# Patient Record
Sex: FEMALE | Race: BLACK OR AFRICAN AMERICAN | NOT HISPANIC OR LATINO | URBAN - METROPOLITAN AREA
[De-identification: names, ages, dates, MRNs, and addresses within clinical notes are randomized per-mention and may not be internally consistent; named-entity substitution may affect disease eponyms.]

---

## 2019-06-11 ENCOUNTER — INPATIENT (INPATIENT)
Facility: HOSPITAL | Age: 79
LOS: 2 days | Discharge: HOME HEALTH | End: 2019-06-14
Attending: INTERNAL MEDICINE | Admitting: INTERNAL MEDICINE
Payer: MEDICARE

## 2019-06-11 VITALS
WEIGHT: 250 LBS | DIASTOLIC BLOOD PRESSURE: 80 MMHG | SYSTOLIC BLOOD PRESSURE: 142 MMHG | OXYGEN SATURATION: 97 % | TEMPERATURE: 99 F | HEIGHT: 62 IN | RESPIRATION RATE: 20 BRPM | HEART RATE: 91 BPM

## 2019-06-11 DIAGNOSIS — G44.1 VASCULAR HEADACHE, NOT ELSEWHERE CLASSIFIED: ICD-10-CM

## 2019-06-11 DIAGNOSIS — E87.6 HYPOKALEMIA: ICD-10-CM

## 2019-06-11 DIAGNOSIS — I10 ESSENTIAL (PRIMARY) HYPERTENSION: ICD-10-CM

## 2019-06-11 DIAGNOSIS — I63.531 CEREBRAL INFARCTION DUE TO UNSPECIFIED OCCLUSION OR STENOSIS OF RIGHT POSTERIOR CEREBRAL ARTERY: ICD-10-CM

## 2019-06-11 DIAGNOSIS — E78.00 PURE HYPERCHOLESTEROLEMIA, UNSPECIFIED: ICD-10-CM

## 2019-06-11 LAB
ALBUMIN SERPL ELPH-MCNC: 3.9 G/DL — SIGNIFICANT CHANGE UP (ref 3.3–5)
ALP SERPL-CCNC: 133 U/L — HIGH (ref 40–120)
ALT FLD-CCNC: 15 U/L — SIGNIFICANT CHANGE UP (ref 12–78)
ANION GAP SERPL CALC-SCNC: 10 MMOL/L — SIGNIFICANT CHANGE UP (ref 5–17)
APTT BLD: 29.4 SEC — SIGNIFICANT CHANGE UP (ref 27.5–36.3)
AST SERPL-CCNC: 14 U/L — LOW (ref 15–37)
BASOPHILS # BLD AUTO: 0.02 K/UL — SIGNIFICANT CHANGE UP (ref 0–0.2)
BASOPHILS NFR BLD AUTO: 0.3 % — SIGNIFICANT CHANGE UP (ref 0–2)
BILIRUB SERPL-MCNC: 0.7 MG/DL — SIGNIFICANT CHANGE UP (ref 0.2–1.2)
BUN SERPL-MCNC: 10 MG/DL — SIGNIFICANT CHANGE UP (ref 7–23)
CALCIUM SERPL-MCNC: 9.7 MG/DL — SIGNIFICANT CHANGE UP (ref 8.5–10.1)
CHLORIDE SERPL-SCNC: 96 MMOL/L — SIGNIFICANT CHANGE UP (ref 96–108)
CO2 SERPL-SCNC: 29 MMOL/L — SIGNIFICANT CHANGE UP (ref 22–31)
CREAT SERPL-MCNC: 1.11 MG/DL — SIGNIFICANT CHANGE UP (ref 0.5–1.3)
EOSINOPHIL # BLD AUTO: 0.01 K/UL — SIGNIFICANT CHANGE UP (ref 0–0.5)
EOSINOPHIL NFR BLD AUTO: 0.1 % — SIGNIFICANT CHANGE UP (ref 0–6)
GLUCOSE SERPL-MCNC: 123 MG/DL — HIGH (ref 70–99)
HCT VFR BLD CALC: 41.8 % — SIGNIFICANT CHANGE UP (ref 34.5–45)
HGB BLD-MCNC: 13.7 G/DL — SIGNIFICANT CHANGE UP (ref 11.5–15.5)
IMM GRANULOCYTES NFR BLD AUTO: 0.4 % — SIGNIFICANT CHANGE UP (ref 0–1.5)
INR BLD: 1.15 RATIO — SIGNIFICANT CHANGE UP (ref 0.88–1.16)
LYMPHOCYTES # BLD AUTO: 1.71 K/UL — SIGNIFICANT CHANGE UP (ref 1–3.3)
LYMPHOCYTES # BLD AUTO: 22.1 % — SIGNIFICANT CHANGE UP (ref 13–44)
MCHC RBC-ENTMCNC: 27.8 PG — SIGNIFICANT CHANGE UP (ref 27–34)
MCHC RBC-ENTMCNC: 32.8 GM/DL — SIGNIFICANT CHANGE UP (ref 32–36)
MCV RBC AUTO: 85 FL — SIGNIFICANT CHANGE UP (ref 80–100)
MONOCYTES # BLD AUTO: 0.51 K/UL — SIGNIFICANT CHANGE UP (ref 0–0.9)
MONOCYTES NFR BLD AUTO: 6.6 % — SIGNIFICANT CHANGE UP (ref 2–14)
NEUTROPHILS # BLD AUTO: 5.46 K/UL — SIGNIFICANT CHANGE UP (ref 1.8–7.4)
NEUTROPHILS NFR BLD AUTO: 70.5 % — SIGNIFICANT CHANGE UP (ref 43–77)
NRBC # BLD: 0 /100 WBCS — SIGNIFICANT CHANGE UP (ref 0–0)
PLATELET # BLD AUTO: 270 K/UL — SIGNIFICANT CHANGE UP (ref 150–400)
POTASSIUM SERPL-MCNC: 3.3 MMOL/L — LOW (ref 3.5–5.3)
POTASSIUM SERPL-SCNC: 3.3 MMOL/L — LOW (ref 3.5–5.3)
PROT SERPL-MCNC: 8.8 GM/DL — HIGH (ref 6–8.3)
PROTHROM AB SERPL-ACNC: 12.9 SEC — SIGNIFICANT CHANGE UP (ref 10–12.9)
RBC # BLD: 4.92 M/UL — SIGNIFICANT CHANGE UP (ref 3.8–5.2)
RBC # FLD: 13.7 % — SIGNIFICANT CHANGE UP (ref 10.3–14.5)
SODIUM SERPL-SCNC: 135 MMOL/L — SIGNIFICANT CHANGE UP (ref 135–145)
WBC # BLD: 7.74 K/UL — SIGNIFICANT CHANGE UP (ref 3.8–10.5)
WBC # FLD AUTO: 7.74 K/UL — SIGNIFICANT CHANGE UP (ref 3.8–10.5)

## 2019-06-11 PROCEDURE — 71045 X-RAY EXAM CHEST 1 VIEW: CPT | Mod: 26

## 2019-06-11 PROCEDURE — 70551 MRI BRAIN STEM W/O DYE: CPT | Mod: 26

## 2019-06-11 PROCEDURE — 99285 EMERGENCY DEPT VISIT HI MDM: CPT

## 2019-06-11 PROCEDURE — 70450 CT HEAD/BRAIN W/O DYE: CPT | Mod: 26

## 2019-06-11 PROCEDURE — 99223 1ST HOSP IP/OBS HIGH 75: CPT | Mod: AI

## 2019-06-11 RX ORDER — ENOXAPARIN SODIUM 100 MG/ML
40 INJECTION SUBCUTANEOUS EVERY 24 HOURS
Refills: 0 | Status: DISCONTINUED | OUTPATIENT
Start: 2019-06-11 | End: 2019-06-14

## 2019-06-11 RX ORDER — AMLODIPINE BESYLATE 2.5 MG/1
10 TABLET ORAL DAILY
Refills: 0 | Status: DISCONTINUED | OUTPATIENT
Start: 2019-06-11 | End: 2019-06-12

## 2019-06-11 RX ORDER — ATORVASTATIN CALCIUM 80 MG/1
10 TABLET, FILM COATED ORAL AT BEDTIME
Refills: 0 | Status: DISCONTINUED | OUTPATIENT
Start: 2019-06-11 | End: 2019-06-12

## 2019-06-11 RX ORDER — ACETAMINOPHEN 500 MG
650 TABLET ORAL ONCE
Refills: 0 | Status: COMPLETED | OUTPATIENT
Start: 2019-06-11 | End: 2019-06-11

## 2019-06-11 RX ORDER — MONTELUKAST 4 MG/1
10 TABLET, CHEWABLE ORAL DAILY
Refills: 0 | Status: DISCONTINUED | OUTPATIENT
Start: 2019-06-11 | End: 2019-06-14

## 2019-06-11 RX ORDER — FOLIC ACID 0.8 MG
1 TABLET ORAL DAILY
Refills: 0 | Status: DISCONTINUED | OUTPATIENT
Start: 2019-06-11 | End: 2019-06-14

## 2019-06-11 RX ORDER — ASPIRIN/CALCIUM CARB/MAGNESIUM 324 MG
81 TABLET ORAL DAILY
Refills: 0 | Status: DISCONTINUED | OUTPATIENT
Start: 2019-06-11 | End: 2019-06-14

## 2019-06-11 RX ORDER — POTASSIUM CHLORIDE 20 MEQ
40 PACKET (EA) ORAL ONCE
Refills: 0 | Status: COMPLETED | OUTPATIENT
Start: 2019-06-11 | End: 2019-06-11

## 2019-06-11 RX ORDER — FAMOTIDINE 10 MG/ML
40 INJECTION INTRAVENOUS DAILY
Refills: 0 | Status: DISCONTINUED | OUTPATIENT
Start: 2019-06-11 | End: 2019-06-14

## 2019-06-11 RX ORDER — HYDROCHLOROTHIAZIDE 25 MG
12.5 TABLET ORAL DAILY
Refills: 0 | Status: DISCONTINUED | OUTPATIENT
Start: 2019-06-11 | End: 2019-06-14

## 2019-06-11 RX ADMIN — ATORVASTATIN CALCIUM 10 MILLIGRAM(S): 80 TABLET, FILM COATED ORAL at 21:26

## 2019-06-11 RX ADMIN — ENOXAPARIN SODIUM 40 MILLIGRAM(S): 100 INJECTION SUBCUTANEOUS at 21:25

## 2019-06-11 RX ADMIN — Medication 650 MILLIGRAM(S): at 14:30

## 2019-06-11 RX ADMIN — Medication 40 MILLIEQUIVALENT(S): at 21:26

## 2019-06-11 RX ADMIN — MONTELUKAST 10 MILLIGRAM(S): 4 TABLET, CHEWABLE ORAL at 21:26

## 2019-06-11 RX ADMIN — Medication 40 MILLIEQUIVALENT(S): at 15:55

## 2019-06-11 RX ADMIN — Medication 650 MILLIGRAM(S): at 14:02

## 2019-06-11 NOTE — ED ADULT NURSE NOTE - NSIMPLEMENTINTERV_GEN_ALL_ED
Implemented All Universal Safety Interventions:  [place] to call system. Call bell, personal items and telephone within reach. Instruct patient to call for assistance. Room bathroom lighting operational. Non-slip footwear when patient is off stretcher. Physically safe environment: no spills, clutter or unnecessary equipment. Stretcher in lowest position, wheels locked, appropriate side rails in place.

## 2019-06-11 NOTE — CONSULT NOTE ADULT - ASSESSMENT
Subjective Complaints:      Consult requested by ER doctor:                  Attending:     History of Present Illness:  Chief Complaint/Reason for Admission:  History of Present Illness:  HPI: headache  hx of htn  poor historian ct head r post  parietal  infarction r/o embolic   awaek alert speech fluent arm leg 4/5 sesnory intact  no hx of cancer  for work up mri misael echo doppler lipid profile  asa 81 mg  asim         PAST MEDICAL & SURGICAL HISTORY:  High cholesterol  HTN (hypertension)  78yFemale    MEDICATIONS  (STANDING):    MEDICATIONS  (PRN):      Allergies    dairy products (Unknown)  Milk (Unknown)  No Known Drug Allergies    Intolerances      FAMILY HISTORY:      REVIEW OF SYSTEMS:  General:  No wt loss, fevers, chills, night sweats  Eyes:  Good vision, no reported pain  ENT:  No sore throat, pain, runny nose, dysphagia  CV:  No pain, palpitatioins, hypo/hypertension  Resp:  No dyspnea, cough, tachypnea, wheezing  GI:  No pain, nausea, vomiting, diarrhea, constipatiion  :  No pain, bleeding, incontinence, nocturia  Muscle:  No pain, weakness  Breast:  No pain, abscess, mass, discharge  Neuro:  No weakness, tingling, memory problems  Psych:  No fatigue, insomnia, mood problems, depression  Endocrine:  No polyuria, polydypsia, cold/heat intolerance  Heme:  No petechiae, ecchymosis, easy bruisability  Skin:  No rash, tattoos, scars, edema      Vital Signs Last 24 Hrs  T(C): 37.3 (11 Jun 2019 15:53), Max: 37.4 (11 Jun 2019 12:29)  T(F): 99.1 (11 Jun 2019 15:53), Max: 99.4 (11 Jun 2019 12:29)  HR: 91 (11 Jun 2019 15:53) (91 - 91)  BP: 113/80 (11 Jun 2019 15:53) (113/80 - 142/80)  BP(mean): --  RR: 18 (11 Jun 2019 15:53) (18 - 20)  SpO2: 96% (11 Jun 2019 15:53) (96% - 97%)    GENERAL PHYSICAL EXAM:  General:  Appears stated age, well-groomed, well-nourished, no distress  HEENT:  NC/AT, patent nares w/ pink mucosa, OP clear w/o lesions, PERRL, EOMI, conjunctivae clear, no thyromegaly, nodules, adenopathy, no JVD  Chest:  Full & symmetric excursion, no increased effort, breath sounds clear  Cardiovascular:  Regular rhythm, S1, S2, no murmur/rub/S3/S4, no carotid/femoral/abdominal bruit, radial/pedal pulses 2+, no edema  Abdomen:  Soft, non-tender, non-distended, normoactive bowel sounds, no HSM  Extremities:  Gait & station:   Digits:   Nails:   Joints, Bones, Muscles:   ROM:   Stability:  Skin:  No rash/erythema/ecchymoses/petechiae/wounds/abscess/warm/dry  Musculoskeletal:  Full ROM in all joints w/o swelling/tenderness/effusion    NEUROLOGICAL EXAM:  HENT:  Normocephalic head; atraumatic head.  Neck supple.  ENT: normal looking.  Mental State:    Alert.  Fully oriented to person, place and date.  Coherent.  Speech clear and intact.  Cooperative.  Responds appropriately.    Cranial Nerves:  II-XII:   Pupils round and reactive to light and accommodation.  Extraocular movements full.  Visual fields full (no homonymous hemianopsia).  Visual acuity wnl.  Facial symmetry intact.  Tongue midline.  Motor Functions:  Moves all extremities.  No pronator drift of UE.  Claps hand well.  Hand  intact bilaterally.  Ambulatory.    Sensory Functions:   Intact to touch and pinprick to face and extremities.    Reflexes:  Deep tendon reflexes normoactive to biceps, knees and ankles.  Babinski absent (present).  Cerebellar Testing:    Finger to nose intact.  Nystagmus absent.  Neurovascular: Carotid auscultation full without bruits.      LABS:                        13.7   7.74  )-----------( 270      ( 11 Jun 2019 14:12 )             41.8     06-11    135  |  96  |  10  ----------------------------<  123<H>  3.3<L>   |  29  |  1.11    Ca    9.7      11 Jun 2019 14:12    TPro  8.8<H>  /  Alb  3.9  /  TBili  0.7  /  DBili  x   /  AST  14<L>  /  ALT  15  /  AlkPhos  133<H>  06-11    PT/INR - ( 11 Jun 2019 14:12 )   PT: 12.9 sec;   INR: 1.15 ratio         PTT - ( 11 Jun 2019 14:12 )  PTT:29.4 sec        RADIOLOGY & ADDITIONAL STUDIES:      Assessment & Opinion: events noted headache hx of htn ct ghead r parietal  infarction r/o embolic  for asim echo doppler asa 81 mg arm leg 4/5 sesnory intact will follow     Recommendations:  Brain MRI.  Carotid doppler.  Echocardiogram.  EEG.   DVT prophylaxis as ordered.  Medications:

## 2019-06-11 NOTE — H&P ADULT - NSHPREVIEWOFSYSTEMS_GEN_ALL_CORE
CONSTITUTIONAL: No fever, weight loss, or fatigue  EYES: No eye pain, visual disturbances, or discharge  ENMT:  No difficulty hearing, tinnitus, vertigo; No sinus or throat pain  NECK: No pain or stiffness  RESPIRATORY: No cough, wheezing, chills or hemoptysis; No shortness of breath  CARDIOVASCULAR: No chest pain, palpitations, dizziness, or leg swelling  GASTROINTESTINAL: No abdominal or epigastric pain. No nausea, vomiting, or hematemesis; No diarrhea or constipation. No melena or hematochezia.  GENITOURINARY: No dysuria, frequency, hematuria, or incontinence  NEUROLOGICAL:see history of present illness   SKIN: No itching, burning, rashes, or lesions   LYMPH NODES: No enlarged glands  ENDOCRINE: No heat or cold intolerance; No hair loss  MUSCULOSKELETAL: No joint pain or swelling; No muscle, back, or extremity pain  PSYCHIATRIC: No depression, anxiety, mood swings, or difficulty sleeping  HEME/LYMPH: No easy bruising, or bleeding gums  ALLERGY AND IMMUNOLOGIC: No hives or eczema

## 2019-06-11 NOTE — ED ADULT TRIAGE NOTE - CHIEF COMPLAINT QUOTE
daughter translating on behalf mother connie speaking , " My mom had a very bad headache yesterday and today she woke up dizzy and vomiting " pt denies blurred vision

## 2019-06-11 NOTE — H&P ADULT - NSHPPHYSICALEXAM_GEN_ALL_CORE
ICU Vital Signs Last 24 Hrs  T(C): 36 (11 Jun 2019 18:01), Max: 37.4 (11 Jun 2019 12:29)  T(F): 96.8 (11 Jun 2019 18:01), Max: 99.4 (11 Jun 2019 12:29)  HR: 81 (11 Jun 2019 18:01) (81 - 91)  BP: 114/71 (11 Jun 2019 18:01) (113/80 - 142/80)  BP(mean): --  ABP: --  ABP(mean): --  RR: 16 (11 Jun 2019 18:01) (16 - 20)  SpO2: 99% (11 Jun 2019 18:01) (96% - 99%)  GENERAL: NAD well-developed  HEAD:  Atraumatic, Normocephalic  EYES: EOMI, PERRLA, conjunctiva and sclera clear  ENMT: No tonsillar erythema, exudates, or enlargement; Moist mucous membranes, Good dentition, No lesions  NECK: Supple, No JVD, Normal thyroid  NERVOUS SYSTEM:  Alert & Oriented X3, Good concentration; Motor Strength 5/5 B/L upper and lower extremities; DTRs 2+ intact and symmetric  CHEST/LUNG: Clear to percussion bilaterally; No rales, rhonchi, wheezing, or rubs  HEART: Regular rate and rhythm; No murmurs, rubs, or gallops  ABDOMEN: Soft, Nontender, Nondistended; Bowel sounds present  EXTREMITIES:  2+ Peripheral Pulses, No clubbing, cyanosis, or edema  LYMPH: No lymphadenopathy   SKIN: No rashes or lesions

## 2019-06-11 NOTE — H&P ADULT - ASSESSMENT
79 female with history of Hypertension and elevated lipids presents with headache and found to have a right parietal cerebrovascular accident - neurology consult appreciated and will do cerebrovascular accident w/u     IMPROVE VTE Individual Risk Assessment          RISK                                                          Points  [  ] Previous VTE                                                3  [  ] Thrombophilia                                             2  [  ] Lower limb paralysis                                   2        (unable to hold up >15 seconds)    [  ] Current Cancer                                             2         (within 6 months)  [x  ] Immobilization > 24 hrs                              1  [  ] ICU/CCU stay > 24 hours                             1  [  x] Age > 60                                                         1    IMPROVE VTE Score: 2

## 2019-06-11 NOTE — ED PROVIDER NOTE - OBJECTIVE STATEMENT
78yo female presents with headache for last few days. Patient denies chest pain, fever, chills, abdominal pain, trauma, nausea, vomiting.

## 2019-06-11 NOTE — H&P ADULT - HISTORY OF PRESENT ILLNESS
78yo female presents with headache for last few days. Patient denies chest pain, fever, chills, abdominal pain, trauma, nausea, vomiting.- patient denies motor or sensory deficits and family concurs except for mild weakness while walking- in the emergency room she was found to have a parietal cerebrovascular accident and was admitted

## 2019-06-11 NOTE — ED PROVIDER NOTE - CARE PLAN
Principal Discharge DX:	Headache Principal Discharge DX:	CVA (cerebral vascular accident)  Secondary Diagnosis:	Headache

## 2019-06-11 NOTE — H&P ADULT - NSHPPOASURGSITEINCISION_GEN_ALL_CORE
Pt states no changes to meds or diet.  No bleeding issues.  Adjusted coumadin dose and instructed pt to increase Vit K intake today with a large broccoli serving.  Instructed pt to watch for s/s bleeding and report to ER for blunt trauma; pt denies bleeding issues at this time.  Recheck INR on Thursday.  Pt verbalizes.  Patient instructed regarding medication; results given and questions answered. Nutritional counseling given.  Dietary factors affecting therapy addressed.  Patient instructed to monitor for excessive bruising or bleeding.  Electronically signed by JR Shearer      
no

## 2019-06-12 DIAGNOSIS — R51 HEADACHE: ICD-10-CM

## 2019-06-12 LAB
ANION GAP SERPL CALC-SCNC: 9 MMOL/L — SIGNIFICANT CHANGE UP (ref 5–17)
BUN SERPL-MCNC: 11 MG/DL — SIGNIFICANT CHANGE UP (ref 7–23)
CALCIUM SERPL-MCNC: 9.6 MG/DL — SIGNIFICANT CHANGE UP (ref 8.5–10.1)
CHLORIDE SERPL-SCNC: 103 MMOL/L — SIGNIFICANT CHANGE UP (ref 96–108)
CHOLEST SERPL-MCNC: 215 MG/DL — HIGH (ref 10–199)
CO2 SERPL-SCNC: 23 MMOL/L — SIGNIFICANT CHANGE UP (ref 22–31)
CREAT SERPL-MCNC: 0.92 MG/DL — SIGNIFICANT CHANGE UP (ref 0.5–1.3)
GLUCOSE SERPL-MCNC: 104 MG/DL — HIGH (ref 70–99)
HCT VFR BLD CALC: 41.6 % — SIGNIFICANT CHANGE UP (ref 34.5–45)
HDLC SERPL-MCNC: 45 MG/DL — LOW
HGB BLD-MCNC: 13.5 G/DL — SIGNIFICANT CHANGE UP (ref 11.5–15.5)
LIPID PNL WITH DIRECT LDL SERPL: 154 MG/DL — HIGH
MCHC RBC-ENTMCNC: 28 PG — SIGNIFICANT CHANGE UP (ref 27–34)
MCHC RBC-ENTMCNC: 32.5 GM/DL — SIGNIFICANT CHANGE UP (ref 32–36)
MCV RBC AUTO: 86.1 FL — SIGNIFICANT CHANGE UP (ref 80–100)
NRBC # BLD: 0 /100 WBCS — SIGNIFICANT CHANGE UP (ref 0–0)
PLATELET # BLD AUTO: 255 K/UL — SIGNIFICANT CHANGE UP (ref 150–400)
POTASSIUM SERPL-MCNC: 4.2 MMOL/L — SIGNIFICANT CHANGE UP (ref 3.5–5.3)
POTASSIUM SERPL-SCNC: 4.2 MMOL/L — SIGNIFICANT CHANGE UP (ref 3.5–5.3)
RBC # BLD: 4.83 M/UL — SIGNIFICANT CHANGE UP (ref 3.8–5.2)
RBC # FLD: 13.9 % — SIGNIFICANT CHANGE UP (ref 10.3–14.5)
SODIUM SERPL-SCNC: 135 MMOL/L — SIGNIFICANT CHANGE UP (ref 135–145)
TOTAL CHOLESTEROL/HDL RATIO MEASUREMENT: 4.8 RATIO — SIGNIFICANT CHANGE UP (ref 3.3–7.1)
TRIGL SERPL-MCNC: 82 MG/DL — SIGNIFICANT CHANGE UP (ref 10–149)
WBC # BLD: 7.32 K/UL — SIGNIFICANT CHANGE UP (ref 3.8–10.5)
WBC # FLD AUTO: 7.32 K/UL — SIGNIFICANT CHANGE UP (ref 3.8–10.5)

## 2019-06-12 PROCEDURE — 93880 EXTRACRANIAL BILAT STUDY: CPT | Mod: 26

## 2019-06-12 PROCEDURE — 99233 SBSQ HOSP IP/OBS HIGH 50: CPT

## 2019-06-12 RX ORDER — AMLODIPINE BESYLATE 2.5 MG/1
5 TABLET ORAL DAILY
Refills: 0 | Status: DISCONTINUED | OUTPATIENT
Start: 2019-06-12 | End: 2019-06-14

## 2019-06-12 RX ORDER — ATORVASTATIN CALCIUM 80 MG/1
20 TABLET, FILM COATED ORAL AT BEDTIME
Refills: 0 | Status: DISCONTINUED | OUTPATIENT
Start: 2019-06-12 | End: 2019-06-14

## 2019-06-12 RX ORDER — ACETAMINOPHEN 500 MG
650 TABLET ORAL EVERY 6 HOURS
Refills: 0 | Status: DISCONTINUED | OUTPATIENT
Start: 2019-06-12 | End: 2019-06-14

## 2019-06-12 RX ADMIN — AMLODIPINE BESYLATE 10 MILLIGRAM(S): 2.5 TABLET ORAL at 11:52

## 2019-06-12 RX ADMIN — Medication 650 MILLIGRAM(S): at 01:00

## 2019-06-12 RX ADMIN — Medication 650 MILLIGRAM(S): at 00:21

## 2019-06-12 RX ADMIN — Medication 20 MILLIGRAM(S): at 11:52

## 2019-06-12 RX ADMIN — ENOXAPARIN SODIUM 40 MILLIGRAM(S): 100 INJECTION SUBCUTANEOUS at 21:57

## 2019-06-12 RX ADMIN — MONTELUKAST 10 MILLIGRAM(S): 4 TABLET, CHEWABLE ORAL at 11:51

## 2019-06-12 RX ADMIN — Medication 650 MILLIGRAM(S): at 22:19

## 2019-06-12 RX ADMIN — Medication 1 MILLIGRAM(S): at 11:51

## 2019-06-12 RX ADMIN — Medication 12.5 MILLIGRAM(S): at 11:51

## 2019-06-12 RX ADMIN — Medication 81 MILLIGRAM(S): at 11:51

## 2019-06-12 RX ADMIN — FAMOTIDINE 40 MILLIGRAM(S): 10 INJECTION INTRAVENOUS at 17:09

## 2019-06-12 RX ADMIN — ATORVASTATIN CALCIUM 20 MILLIGRAM(S): 80 TABLET, FILM COATED ORAL at 21:57

## 2019-06-12 NOTE — PHYSICAL THERAPY INITIAL EVALUATION ADULT - BALANCE TRAINING, PT EVAL
GOAL: pt will be able to independently ambulate 300ft without assistive device without loss of balance within 2 weeks

## 2019-06-12 NOTE — PHYSICAL THERAPY INITIAL EVALUATION ADULT - ADDITIONAL COMMENTS
Pt lives with several family members (all work full time) in a private home, 2 entry steps (+ rail), pt stays on main floor. Prior to admission pt was independent with all functional mobility including community ambulation without a device & ADL's. Pt has a tub/shower combo, no grab bars, fixed shower head, & standard toilet seat height. Pt is right hand dominant & wears eye glasses. Family endorse they will provide supervision for all functional mobility upon D/C home due to PT's safety concerns associated with visual impairment s/p CVA. Goal of therapy: return home.

## 2019-06-12 NOTE — PHYSICAL THERAPY INITIAL EVALUATION ADULT - PLANNED THERAPY INTERVENTIONS, PT EVAL
gait training/balance training/stairs: GOAL: pt will be able to independently ascend/descend 2 steps with one rail (step-to-step pattern) within 2 weeks/transfer training/bed mobility training

## 2019-06-12 NOTE — PHYSICAL THERAPY INITIAL EVALUATION ADULT - FOLLOWS COMMANDS/ANSWERS QUESTIONS, REHAB EVAL
Pt. is able to do complex problem solving but is limited owing to limited flexibility, insight, social behavior, and endurance. Pt. is susceptible to breakdown of behavior outside of a structured setting (e.g., school or work). In stressful situations, shows reserved cognitive functions. Cognitive processing is slow./able to follow multistep instructions

## 2019-06-12 NOTE — PROGRESS NOTE ADULT - ASSESSMENT
Subjective Complaints:  Historian:             Vital Signs Last 24 Hrs  T(C): 37 (12 Jun 2019 16:55), Max: 37.6 (11 Jun 2019 23:35)  T(F): 98.6 (12 Jun 2019 16:55), Max: 99.7 (12 Jun 2019 04:36)  HR: 81 (12 Jun 2019 16:55) (81 - 89)  BP: 119/65 (12 Jun 2019 16:55) (116/76 - 138/73)  BP(mean): --  RR: 16 (12 Jun 2019 16:55) (16 - 18)  SpO2: 97% (12 Jun 2019 16:55) (97% - 99%)    GENERAL PHYSICAL EXAM:  General:  Appears stated age, well-groomed, well-nourished, no distress  HEENT:  NC/AT, patent nares w/ pink mucosa, OP clear w/o lesions, PERRL, EOMI, conjunctivae clear, no thyromegaly, nodules, adenopathy, no JVD  Chest:  Full & symmetric excursion, no increased effort, breath sounds clear  Cardiovascular:  Regular rhythm, S1, S2, no murmur/rub/S3/S4, no carotid/femoral/abdominal bruit, radial/pedal pulses 2+, no edema  Abdomen:  Soft, non-tender, non-distended, normoactive bowel sounds, no HSM  Extremities:  Gait & station:   Digits:   Nails:   Joints, Bones, Muscles:   ROM:   Stability:  Skin:  No rash/erythema/ecchymoses/petechiae/wounds/abscess/warm/dry  Musculoskeletal:  Full ROM in all joints w/o swelling/tenderness/effusion        LABS:                        13.5   7.32  )-----------( 255      ( 12 Jun 2019 06:30 )             41.6     06-12    135  |  103  |  11  ----------------------------<  104<H>  4.2   |  23  |  0.92    Ca    9.6      12 Jun 2019 06:30    TPro  8.8<H>  /  Alb  3.9  /  TBili  0.7  /  DBili  x   /  AST  14<L>  /  ALT  15  /  AlkPhos  133<H>  06-11    PT/INR - ( 11 Jun 2019 14:12 )   PT: 12.9 sec;   INR: 1.15 ratio         PTT - ( 11 Jun 2019 14:12 )  PTT:29.4 sec      RADIOLOGY & ADDITIONAL STUDIES:        Neurology Progress Note:      Mental Status: awake  alert speech fluent  headache       Cranial Nerves: 2 .12 intact      Motor:  arm leg 4/5          Sensory:intact      Cerebellar: deferd       Gait:   unsteady       Assesment/Plan: mri misael new r mca post parietal infarctionr/o embolic for asim  echo  asa 81 mg for pt rehab discuss with family

## 2019-06-12 NOTE — PHYSICAL THERAPY INITIAL EVALUATION ADULT - PERTINENT HX OF CURRENT PROBLEM, REHAB EVAL
Pt is a 79yo Indonesian-Creole speaking F admitted with HA x several days. Imaging revealed positive small bland acute infarct

## 2019-06-12 NOTE — PROGRESS NOTE ADULT - SUBJECTIVE AND OBJECTIVE BOX
Patient is a 78y old  Female who presents with a chief complaint of headache (2019 17:56)    HPI:  78yo female presents with headache for last few days. Patient denies chest pain, fever, chills, abdominal pain, trauma, nausea, vomiting.- patient denies motor or sensory deficits and family concurs except for mild weakness while walking- in the emergency room she was found to have a parietal cerebrovascular accident and was admitted (2019 17:56)    SUBJECTIVE & OBJECTIVE: Pt seen and examined at bedside. Complaining of headache.   PHYSICAL EXAM:  ICU Vital Signs Last 24 Hrs  T(C): 37.1 (2019 12:37), Max: 37.6 (2019 23:35)  T(F): 98.7 (2019 12:37), Max: 99.7 (2019 04:36)  HR: 89 (2019 12:37) (81 - 91)  BP: 116/76 (2019 12:37) (113/80 - 138/73)  RR: 16 (2019 12:37) (16 - 18)  SpO2: 97% (2019 12:37) (96% - 99%)  Daily     Daily Weight in k.8 (2019 04:36)  I&O's Detail    GENERAL: NAD, well-groomed, well-developed  HEAD:  Atraumatic, Normocephalic  EYES: EOMI, PERRLA, conjunctiva and sclera clear  ENMT: Moist mucous membranes  NECK: Supple, No JVD  NERVOUS SYSTEM:  Alert & Oriented X3, Motor Strength 5/5 B/L upper and lower extremities; DTRs 2+ intact and symmetric  CHEST/LUNG: Clear to auscultation bilaterally; No rales, rhonchi, wheezing, or rubs  HEART: Regular rate and rhythm; No murmurs, rubs, or gallops  ABDOMEN: Soft, Nontender, Nondistended; Bowel sounds present  EXTREMITIES:  2+ Peripheral Pulses, No clubbing, cyanosis, or edema    MEDICATIONS  (STANDING):  amLODIPine   Tablet 10 milliGRAM(s) Oral daily  aspirin enteric coated 81 milliGRAM(s) Oral daily  atorvastatin 20 milliGRAM(s) Oral at bedtime  enalapril 20 milliGRAM(s) Oral daily  enoxaparin Injectable 40 milliGRAM(s) SubCutaneous every 24 hours  famotidine    Tablet 40 milliGRAM(s) Oral daily  folic acid 1 milliGRAM(s) Oral daily  hydrochlorothiazide 12.5 milliGRAM(s) Oral daily  montelukast 10 milliGRAM(s) Oral daily    MEDICATIONS  (PRN):  acetaminophen   Tablet .. 650 milliGRAM(s) Oral every 6 hours PRN Mild Pain (1 - 3)      LABS:                        13.5   7.32  )-----------( 255      ( 2019 06:30 )             41.6     06-12    135  |  103  |  11  ----------------------------<  104<H>  4.2   |  23  |  0.92    Ca    9.6      2019 06:30    TPro  8.8<H>  /  Alb  3.9  /  TBili  0.7  /  DBili  x   /  AST  14<L>  /  ALT  15  /  AlkPhos  133<H>  06-11    PT/INR - ( 2019 14:12 )   PT: 12.9 sec;   INR: 1.15 ratio         PTT - ( 2019 14:12 )  PTT:29.4 sec    RADIOLOGY & ADDITIONAL TESTS:  < from: MR Head No Cont (19 @ 14:56) >  Somewhat motion limited exam but positive for small bland acute infarct   in the posterior branch distribution of the right MCA    < end of copied text >    < from: US Duplex Carotid Arteries Complete, Bilateral (19 @ 08:30) >  No duplex evidence of hemodynamically significant internal carotid artery   stenosis.    < end of copied text >      DVT/GI ppx  Discussed with pt @ bedside

## 2019-06-12 NOTE — PROGRESS NOTE ADULT - PROBLEM SELECTOR PLAN 3
- would not aggressively treat HTN right now as patient is complaining of headache  - lower norvasc to 5 mg daily

## 2019-06-13 LAB — HBA1C BLD-MCNC: 6 % — HIGH (ref 4–5.6)

## 2019-06-13 PROCEDURE — 99232 SBSQ HOSP IP/OBS MODERATE 35: CPT

## 2019-06-13 RX ADMIN — Medication 20 MILLIGRAM(S): at 11:22

## 2019-06-13 RX ADMIN — ATORVASTATIN CALCIUM 20 MILLIGRAM(S): 80 TABLET, FILM COATED ORAL at 21:13

## 2019-06-13 RX ADMIN — FAMOTIDINE 40 MILLIGRAM(S): 10 INJECTION INTRAVENOUS at 11:21

## 2019-06-13 RX ADMIN — Medication 81 MILLIGRAM(S): at 11:22

## 2019-06-13 RX ADMIN — Medication 1 MILLIGRAM(S): at 11:21

## 2019-06-13 RX ADMIN — ENOXAPARIN SODIUM 40 MILLIGRAM(S): 100 INJECTION SUBCUTANEOUS at 21:13

## 2019-06-13 RX ADMIN — MONTELUKAST 10 MILLIGRAM(S): 4 TABLET, CHEWABLE ORAL at 11:21

## 2019-06-13 RX ADMIN — Medication 12.5 MILLIGRAM(S): at 05:36

## 2019-06-13 NOTE — PROGRESS NOTE ADULT - SUBJECTIVE AND OBJECTIVE BOX
INTERVAL HPI/OVERNIGHT EVENTS:  Pt seen and examined at bedside.     Allergies/Intolerance: dairy products (Unknown)  Milk (Unknown)  No Known Drug Allergies      MEDICATIONS  (STANDING):  amLODIPine   Tablet 5 milliGRAM(s) Oral daily  aspirin enteric coated 81 milliGRAM(s) Oral daily  atorvastatin 20 milliGRAM(s) Oral at bedtime  enalapril 20 milliGRAM(s) Oral daily  enoxaparin Injectable 40 milliGRAM(s) SubCutaneous every 24 hours  famotidine    Tablet 40 milliGRAM(s) Oral daily  folic acid 1 milliGRAM(s) Oral daily  hydrochlorothiazide 12.5 milliGRAM(s) Oral daily  montelukast 10 milliGRAM(s) Oral daily    MEDICATIONS  (PRN):  acetaminophen   Tablet .. 650 milliGRAM(s) Oral every 6 hours PRN Mild Pain (1 - 3)        ROS: all systems reviewed and wnl      PHYSICAL EXAMINATION:  Vital Signs Last 24 Hrs  T(C): 36.5 (13 Jun 2019 11:39), Max: 37 (12 Jun 2019 16:55)  T(F): 97.7 (13 Jun 2019 11:39), Max: 98.6 (12 Jun 2019 16:55)  HR: 90 (13 Jun 2019 11:39) (73 - 90)  BP: 128/81 (13 Jun 2019 11:39) (104/67 - 128/81)  BP(mean): --  RR: 16 (13 Jun 2019 11:39) (16 - 18)  SpO2: 95% (13 Jun 2019 11:39) (95% - 99%)  CAPILLARY BLOOD GLUCOSE            GENERAL:   NECK: supple, No JVD  CHEST/LUNG: clear to auscultation bilaterally; no rales, rhonchi, or wheezing b/l  HEART: normal S1, S2  ABDOMEN: BS+, soft, ND, NT   EXTREMITIES:  pulses palpable; no clubbing, cyanosis, or edema b/l LEs  SKIN: no rashes or lesions      LABS:                        13.5   7.32  )-----------( 255      ( 12 Jun 2019 06:30 )             41.6     06-12    135  |  103  |  11  ----------------------------<  104<H>  4.2   |  23  |  0.92    Ca    9.6      12 Jun 2019 06:30    TPro  8.8<H>  /  Alb  3.9  /  TBili  0.7  /  DBili  x   /  AST  14<L>  /  ALT  15  /  AlkPhos  133<H>  06-11    PT/INR - ( 11 Jun 2019 14:12 )   PT: 12.9 sec;   INR: 1.15 ratio         PTT - ( 11 Jun 2019 14:12 )  PTT:29.4 sec INTERVAL HPI/OVERNIGHT EVENTS:  Pt seen and examined at bedside.     Allergies/Intolerance: dairy products (Unknown)  Milk (Unknown)  No Known Drug Allergies      MEDICATIONS  (STANDING):  amLODIPine   Tablet 5 milliGRAM(s) Oral daily  aspirin enteric coated 81 milliGRAM(s) Oral daily  atorvastatin 20 milliGRAM(s) Oral at bedtime  enalapril 20 milliGRAM(s) Oral daily  enoxaparin Injectable 40 milliGRAM(s) SubCutaneous every 24 hours  famotidine    Tablet 40 milliGRAM(s) Oral daily  folic acid 1 milliGRAM(s) Oral daily  hydrochlorothiazide 12.5 milliGRAM(s) Oral daily  montelukast 10 milliGRAM(s) Oral daily    MEDICATIONS  (PRN):  acetaminophen   Tablet .. 650 milliGRAM(s) Oral every 6 hours PRN Mild Pain (1 - 3)        ROS: all systems reviewed and wnl      PHYSICAL EXAMINATION:  Vital Signs Last 24 Hrs  T(C): 36.5 (13 Jun 2019 11:39), Max: 37 (12 Jun 2019 16:55)  T(F): 97.7 (13 Jun 2019 11:39), Max: 98.6 (12 Jun 2019 16:55)  HR: 90 (13 Jun 2019 11:39) (73 - 90)  BP: 128/81 (13 Jun 2019 11:39) (104/67 - 128/81)  BP(mean): --  RR: 16 (13 Jun 2019 11:39) (16 - 18)  SpO2: 95% (13 Jun 2019 11:39) (95% - 99%)  CAPILLARY BLOOD GLUCOSE            GENERAL: comfortable, eating dinner, no focal deficits  NECK: supple, No JVD  CHEST/LUNG: clear to auscultation bilaterally; no rales, rhonchi, or wheezing b/l  HEART: normal S1, S2  ABDOMEN: BS+, soft, ND, NT   EXTREMITIES:  pulses palpable; no clubbing, cyanosis, or edema b/l LEs  SKIN: no rashes or lesions      LABS:                        13.5   7.32  )-----------( 255      ( 12 Jun 2019 06:30 )             41.6     06-12    135  |  103  |  11  ----------------------------<  104<H>  4.2   |  23  |  0.92    Ca    9.6      12 Jun 2019 06:30    TPro  8.8<H>  /  Alb  3.9  /  TBili  0.7  /  DBili  x   /  AST  14<L>  /  ALT  15  /  AlkPhos  133<H>  06-11    PT/INR - ( 11 Jun 2019 14:12 )   PT: 12.9 sec;   INR: 1.15 ratio         PTT - ( 11 Jun 2019 14:12 )  PTT:29.4 sec

## 2019-06-13 NOTE — PROGRESS NOTE ADULT - PROBLEM SELECTOR PLAN 1
- check hgba1c, TTE EF 60 % with normal LV, RV function, PT eval done, reccs for home with home pt.  MRI confirms acute right MCA infarct.   - TTE EF 60 % with normal LV, RV function, PT eval done, reccs for home with home pt.  MRI confirms acute right MCA infarct.  Stop tele as NSR.    -

## 2019-06-13 NOTE — PROGRESS NOTE ADULT - ASSESSMENT
Subjective Complaints:  Historian:             Vital Signs Last 24 Hrs  T(C): 37 (13 Jun 2019 17:33), Max: 37 (12 Jun 2019 23:38)  T(F): 98.6 (13 Jun 2019 17:33), Max: 98.6 (12 Jun 2019 23:38)  HR: 82 (13 Jun 2019 17:33) (73 - 90)  BP: 118/80 (13 Jun 2019 17:33) (104/67 - 128/81)  BP(mean): --  RR: 17 (13 Jun 2019 17:33) (16 - 18)  SpO2: 97% (13 Jun 2019 17:33) (95% - 99%)    GENERAL PHYSICAL EXAM:  General:  Appears stated age, well-groomed, well-nourished, no distress  HEENT:  NC/AT, patent nares w/ pink mucosa, OP clear w/o lesions, PERRL, EOMI, conjunctivae clear, no thyromegaly, nodules, adenopathy, no JVD  Chest:  Full & symmetric excursion, no increased effort, breath sounds clear  Cardiovascular:  Regular rhythm, S1, S2, no murmur/rub/S3/S4, no carotid/femoral/abdominal bruit, radial/pedal pulses 2+, no edema  Abdomen:  Soft, non-tender, non-distended, normoactive bowel sounds, no HSM  Extremities:  Gait & station:   Digits:   Nails:   Joints, Bones, Muscles:   ROM:   Stability:  Skin:  No rash/erythema/ecchymoses/petechiae/wounds/abscess/warm/dry  Musculoskeletal:  Full ROM in all joints w/o swelling/tenderness/effusion        LABS:                        13.5   7.32  )-----------( 255      ( 12 Jun 2019 06:30 )             41.6     06-12    135  |  103  |  11  ----------------------------<  104<H>  4.2   |  23  |  0.92    Ca    9.6      12 Jun 2019 06:30            RADIOLOGY & ADDITIONAL STUDIES:        Neurology Progress Note:      Mental Status: awawk alert speech fluent       Cranial Nerves: 2 /12 intact      Motor:   arm leg 4/5         Sensory: intact      Cerebellar:  deferd       Gait: no ataxia       Assesment/Plan: r cva  htn  for pt echo doppler on asa 81 mg discuss henryy family

## 2019-06-14 VITALS
HEART RATE: 84 BPM | DIASTOLIC BLOOD PRESSURE: 61 MMHG | SYSTOLIC BLOOD PRESSURE: 101 MMHG | OXYGEN SATURATION: 96 % | TEMPERATURE: 96 F

## 2019-06-14 PROCEDURE — 99239 HOSP IP/OBS DSCHRG MGMT >30: CPT

## 2019-06-14 RX ORDER — FAMOTIDINE 10 MG/ML
1 INJECTION INTRAVENOUS
Qty: 0 | Refills: 0 | DISCHARGE

## 2019-06-14 RX ORDER — MONTELUKAST 4 MG/1
1 TABLET, CHEWABLE ORAL
Qty: 0 | Refills: 0 | DISCHARGE

## 2019-06-14 RX ORDER — ATORVASTATIN CALCIUM 80 MG/1
40 TABLET, FILM COATED ORAL AT BEDTIME
Refills: 0 | Status: DISCONTINUED | OUTPATIENT
Start: 2019-06-14 | End: 2019-06-14

## 2019-06-14 RX ORDER — AMLODIPINE BESYLATE 2.5 MG/1
1 TABLET ORAL
Qty: 0 | Refills: 0 | DISCHARGE

## 2019-06-14 RX ORDER — FOLIC ACID 0.8 MG
1 TABLET ORAL
Qty: 0 | Refills: 0 | DISCHARGE

## 2019-06-14 RX ORDER — ASPIRIN/CALCIUM CARB/MAGNESIUM 324 MG
1 TABLET ORAL
Qty: 0 | Refills: 0 | DISCHARGE

## 2019-06-14 RX ADMIN — Medication 1 MILLIGRAM(S): at 12:15

## 2019-06-14 RX ADMIN — AMLODIPINE BESYLATE 5 MILLIGRAM(S): 2.5 TABLET ORAL at 05:17

## 2019-06-14 RX ADMIN — Medication 20 MILLIGRAM(S): at 05:17

## 2019-06-14 RX ADMIN — MONTELUKAST 10 MILLIGRAM(S): 4 TABLET, CHEWABLE ORAL at 12:15

## 2019-06-14 RX ADMIN — FAMOTIDINE 40 MILLIGRAM(S): 10 INJECTION INTRAVENOUS at 12:15

## 2019-06-14 RX ADMIN — Medication 12.5 MILLIGRAM(S): at 05:17

## 2019-06-14 RX ADMIN — Medication 81 MILLIGRAM(S): at 12:15

## 2019-06-14 NOTE — DISCHARGE NOTE PROVIDER - HOSPITAL COURSE
72 y/o female admitted with acute CVA of RMCA posterior branch confirmed on MRI, now all back to baseline. TTE showing  EF 60 % with normal LV, RV function, PT eval performed and recommended dc home with home PT 1-2x/week for 2 weeks. Patient is to stop norvasc secondary to low blood pressure. continue lipitor 40mg. continue home medications as directed. Follow up with Dr. Vivas neurologist in 1 week.  Discussed with Dr. Mora, who cleared patient for discharge home with home PT. 72 y/o female admitted with acute CVA of RMCA posterior branch confirmed on MRI, now all back to baseline. TTE showing  EF 60 % with normal LV, RV function, PT eval performed and recommended dc home with home PT 1-2x/week for 2 weeks. Patient is to stop norvasc secondary to low blood pressure. continue lipitor 40mg. continue home medications as directed. Follow up with Dr. Vivas neurologist in 1 week.  Discussed with Dr. Mora, who cleared patient for discharge home with home PT.         Addendum: Upon further review, CVA had cerebral edema.

## 2019-06-14 NOTE — DISCHARGE NOTE PROVIDER - CARE PROVIDER_API CALL
Vinayak Vivas)  Neurology  [address] [place] [address] [id number]  Phone: [phone]  Fax: [phone]  Follow Up Time:     PCP,   Phone: (   )    -  Fax: (   )    -  Follow Up Time:

## 2019-06-14 NOTE — PROGRESS NOTE ADULT - PROBLEM SELECTOR PROBLEM 1
Cerebrovascular accident (CVA) due to occlusion of right posterior cerebral artery

## 2019-06-14 NOTE — PROGRESS NOTE ADULT - PROBLEM SELECTOR PLAN 1
TTE EF 60 % with normal LV, RV function, PT eval done, reccs for home with home pt.  MRI confirms acute right MCA infarct.  Stop tele as NSR.    - TTE EF 60 % with normal LV, RV function, PT eval done, reccs for home with home pt.  MRI confirms acute right MCA infarct.  Stop tele as NSR.  Discharge to home   later today.   -

## 2019-06-14 NOTE — PROGRESS NOTE ADULT - ASSESSMENT
72 y/o female admitted with acute CVA, now better. 74 y/o female admitted with acute CVA, now all back to baseline.

## 2019-06-14 NOTE — PROGRESS NOTE ADULT - ATTENDING COMMENTS
Discharge home Friday with home PT.
Discharge to home today with home PT. See neurology in one week.

## 2019-06-14 NOTE — PROGRESS NOTE ADULT - SUBJECTIVE AND OBJECTIVE BOX
INTERVAL HPI/OVERNIGHT EVENTS:  Pt seen and examined at bedside.     Allergies/Intolerance: dairy products (Unknown)  Milk (Unknown)  No Known Drug Allergies      MEDICATIONS  (STANDING):  amLODIPine   Tablet 5 milliGRAM(s) Oral daily  aspirin enteric coated 81 milliGRAM(s) Oral daily  atorvastatin 20 milliGRAM(s) Oral at bedtime  enalapril 20 milliGRAM(s) Oral daily  enoxaparin Injectable 40 milliGRAM(s) SubCutaneous every 24 hours  famotidine    Tablet 40 milliGRAM(s) Oral daily  folic acid 1 milliGRAM(s) Oral daily  hydrochlorothiazide 12.5 milliGRAM(s) Oral daily  montelukast 10 milliGRAM(s) Oral daily    MEDICATIONS  (PRN):  acetaminophen   Tablet .. 650 milliGRAM(s) Oral every 6 hours PRN Mild Pain (1 - 3)        ROS: all systems reviewed and wnl      PHYSICAL EXAMINATION:  Vital Signs Last 24 Hrs  T(C): 35.8 (14 Jun 2019 11:31), Max: 37 (13 Jun 2019 17:33)  T(F): 96.5 (14 Jun 2019 11:31), Max: 98.6 (13 Jun 2019 17:33)  HR: 84 (14 Jun 2019 11:31) (66 - 90)  BP: 101/61 (14 Jun 2019 11:31) (101/61 - 138/76)  BP(mean): --  RR: 18 (14 Jun 2019 05:07) (16 - 18)  SpO2: 96% (14 Jun 2019 11:31) (94% - 99%)  CAPILLARY BLOOD GLUCOSE          06-13 @ 07:01  -  06-14 @ 07:00  --------------------------------------------------------  IN: 220 mL / OUT: 0 mL / NET: 220 mL        GENERAL:   NECK: supple, No JVD  CHEST/LUNG: clear to auscultation bilaterally; no rales, rhonchi, or wheezing b/l  HEART: normal S1, S2  ABDOMEN: BS+, soft, ND, NT   EXTREMITIES:  pulses palpable; no clubbing, cyanosis, or edema b/l LEs  SKIN: no rashes or lesions      LABS: INTERVAL HPI/OVERNIGHT EVENTS:  Pt seen and examined at bedside.     Allergies/Intolerance: dairy products (Unknown)  Milk (Unknown)  No Known Drug Allergies      MEDICATIONS  (STANDING):  amLODIPine   Tablet 5 milliGRAM(s) Oral daily  aspirin enteric coated 81 milliGRAM(s) Oral daily  atorvastatin 20 milliGRAM(s) Oral at bedtime  enalapril 20 milliGRAM(s) Oral daily  enoxaparin Injectable 40 milliGRAM(s) SubCutaneous every 24 hours  famotidine    Tablet 40 milliGRAM(s) Oral daily  folic acid 1 milliGRAM(s) Oral daily  hydrochlorothiazide 12.5 milliGRAM(s) Oral daily  montelukast 10 milliGRAM(s) Oral daily    MEDICATIONS  (PRN):  acetaminophen   Tablet .. 650 milliGRAM(s) Oral every 6 hours PRN Mild Pain (1 - 3)        ROS: all systems reviewed and wnl      PHYSICAL EXAMINATION:  Vital Signs Last 24 Hrs  T(C): 35.8 (14 Jun 2019 11:31), Max: 37 (13 Jun 2019 17:33)  T(F): 96.5 (14 Jun 2019 11:31), Max: 98.6 (13 Jun 2019 17:33)  HR: 84 (14 Jun 2019 11:31) (66 - 90)  BP: 101/61 (14 Jun 2019 11:31) (101/61 - 138/76)  BP(mean): --  RR: 18 (14 Jun 2019 05:07) (16 - 18)  SpO2: 96% (14 Jun 2019 11:31) (94% - 99%)  CAPILLARY BLOOD GLUCOSE          06-13 @ 07:01  -  06-14 @ 07:00  --------------------------------------------------------  IN: 220 mL / OUT: 0 mL / NET: 220 mL        GENERAL: stable in bed, tolerates regular diet well, no aphasia  NECK: supple, No JVD  CHEST/LUNG: clear to auscultation bilaterally; no rales, rhonchi, or wheezing b/l  HEART: normal S1, S2  ABDOMEN: BS+, soft, ND, NT   EXTREMITIES:  pulses palpable; no clubbing, cyanosis, or edema b/l LEs  SKIN: no rashes or lesions      LABS:

## 2019-06-14 NOTE — DISCHARGE NOTE PROVIDER - NSDCCPCAREPLAN_GEN_ALL_CORE_FT
PRINCIPAL DISCHARGE DIAGNOSIS  Diagnosis: CVA (cerebral vascular accident)  Assessment and Plan of Treatment: RMCA posterior branch infarct. Back to baseline. Follow up with neurologist Dr. Vivas in 1 week. Follow up with PCP within 10 days of dishcarge date. Continue medications as directed. STOP NORVASC.      SECONDARY DISCHARGE DIAGNOSES  Diagnosis: Essential hypertension  Assessment and Plan of Treatment: continue home bp medications EXCEPT NORVASC.    Diagnosis: Headache  Assessment and Plan of Treatment: tylenol as needed    Diagnosis: High cholesterol  Assessment and Plan of Treatment: lipitor 40mg daily.

## 2019-06-14 NOTE — PROGRESS NOTE ADULT - PROBLEM SELECTOR PLAN 3
- would not aggressively treat HTN right now as patient is complaining of headache  - lower norvasc to 5 mg daily - would not aggressively treat HTN right now as patient is complaining of headache  - Hold norvasc as BP low today.

## 2019-06-14 NOTE — DISCHARGE NOTE NURSING/CASE MANAGEMENT/SOCIAL WORK - NSDCDPATPORTLINK_GEN_ALL_CORE
You can access the [facility] Patient Portal, offered by [facility], by registering with the following website: [email or web address]

## 2019-06-14 NOTE — PROGRESS NOTE ADULT - PROBLEM SELECTOR PLAN 2
- elevated , increase statin dose to lipitor 20 mg/day - elevated , increase statin dose to lipitor 40 mg/day

## 2019-06-18 DIAGNOSIS — I63.9 CEREBRAL INFARCTION, UNSPECIFIED: ICD-10-CM

## 2019-06-18 DIAGNOSIS — T46.1X5A ADVERSE EFFECT OF CALCIUM-CHANNEL BLOCKERS, INITIAL ENCOUNTER: ICD-10-CM

## 2019-06-18 DIAGNOSIS — G44.1 VASCULAR HEADACHE, NOT ELSEWHERE CLASSIFIED: ICD-10-CM

## 2019-06-18 DIAGNOSIS — Z79.82 LONG TERM (CURRENT) USE OF ASPIRIN: ICD-10-CM

## 2019-06-18 DIAGNOSIS — I63.531 CEREBRAL INFARCTION DUE TO UNSPECIFIED OCCLUSION OR STENOSIS OF RIGHT POSTERIOR CEREBRAL ARTERY: ICD-10-CM

## 2019-06-18 DIAGNOSIS — Z79.52 LONG TERM (CURRENT) USE OF SYSTEMIC STEROIDS: ICD-10-CM

## 2019-06-18 DIAGNOSIS — I10 ESSENTIAL (PRIMARY) HYPERTENSION: ICD-10-CM

## 2019-06-18 DIAGNOSIS — E87.6 HYPOKALEMIA: ICD-10-CM

## 2019-06-18 DIAGNOSIS — Z79.899 OTHER LONG TERM (CURRENT) DRUG THERAPY: ICD-10-CM

## 2019-06-18 DIAGNOSIS — Z91.011 ALLERGY TO MILK PRODUCTS: ICD-10-CM

## 2019-06-18 DIAGNOSIS — I95.89 OTHER HYPOTENSION: ICD-10-CM

## 2019-06-18 DIAGNOSIS — R53.1 WEAKNESS: ICD-10-CM

## 2019-06-18 DIAGNOSIS — E78.00 PURE HYPERCHOLESTEROLEMIA, UNSPECIFIED: ICD-10-CM

## 2019-07-01 ENCOUNTER — OUTPATIENT (OUTPATIENT)
Dept: OUTPATIENT SERVICES | Facility: HOSPITAL | Age: 79
LOS: 1 days | End: 2019-07-01
Payer: MEDICARE

## 2019-07-01 PROCEDURE — G9001: CPT

## 2019-07-12 DIAGNOSIS — Z71.89 OTHER SPECIFIED COUNSELING: ICD-10-CM

## 2019-07-12 PROBLEM — I10 ESSENTIAL (PRIMARY) HYPERTENSION: Chronic | Status: ACTIVE | Noted: 2019-06-11

## 2019-07-12 PROBLEM — E78.00 PURE HYPERCHOLESTEROLEMIA, UNSPECIFIED: Chronic | Status: ACTIVE | Noted: 2019-06-11

## 2019-12-04 NOTE — INPATIENT CERTIFICATION FOR MEDICARE PATIENTS - PHYSICIAN CONCUR
I concur with the Admission Order and I certify that services are provided in accordance with Section 42 CFR § 412.3
EMT/paramedic

## 2021-10-14 PROCEDURE — 99285 EMERGENCY DEPT VISIT HI MDM: CPT

## 2021-10-15 ENCOUNTER — INPATIENT (INPATIENT)
Facility: HOSPITAL | Age: 81
LOS: 0 days | Discharge: NURSING, REHAB OR LONG-TERM CARE | End: 2021-10-15
Attending: INTERNAL MEDICINE | Admitting: INTERNAL MEDICINE
Payer: MEDICARE

## 2021-10-15 VITALS
SYSTOLIC BLOOD PRESSURE: 143 MMHG | RESPIRATION RATE: 16 BRPM | DIASTOLIC BLOOD PRESSURE: 84 MMHG | OXYGEN SATURATION: 98 % | HEART RATE: 85 BPM | TEMPERATURE: 98 F

## 2021-10-15 VITALS
SYSTOLIC BLOOD PRESSURE: 103 MMHG | OXYGEN SATURATION: 93 % | HEART RATE: 93 BPM | TEMPERATURE: 97 F | HEIGHT: 62 IN | WEIGHT: 220.02 LBS | RESPIRATION RATE: 17 BRPM | DIASTOLIC BLOOD PRESSURE: 71 MMHG

## 2021-10-15 DIAGNOSIS — Z86.73 PERSONAL HISTORY OF TRANSIENT ISCHEMIC ATTACK (TIA), AND CEREBRAL INFARCTION WITHOUT RESIDUAL DEFICITS: ICD-10-CM

## 2021-10-15 DIAGNOSIS — R31.9 HEMATURIA, UNSPECIFIED: ICD-10-CM

## 2021-10-15 DIAGNOSIS — G40.909 EPILEPSY, UNSPECIFIED, NOT INTRACTABLE, WITHOUT STATUS EPILEPTICUS: ICD-10-CM

## 2021-10-15 DIAGNOSIS — I48.0 PAROXYSMAL ATRIAL FIBRILLATION: ICD-10-CM

## 2021-10-15 DIAGNOSIS — I10 ESSENTIAL (PRIMARY) HYPERTENSION: ICD-10-CM

## 2021-10-15 DIAGNOSIS — R53.2 FUNCTIONAL QUADRIPLEGIA: ICD-10-CM

## 2021-10-15 DIAGNOSIS — E78.5 HYPERLIPIDEMIA, UNSPECIFIED: ICD-10-CM

## 2021-10-15 DIAGNOSIS — G30.9 ALZHEIMER'S DISEASE, UNSPECIFIED: ICD-10-CM

## 2021-10-15 LAB
ALBUMIN SERPL ELPH-MCNC: 2.7 G/DL — LOW (ref 3.3–5)
ALP SERPL-CCNC: 108 U/L — SIGNIFICANT CHANGE UP (ref 40–120)
ALT FLD-CCNC: 18 U/L — SIGNIFICANT CHANGE UP (ref 12–78)
ANION GAP SERPL CALC-SCNC: 5 MMOL/L — SIGNIFICANT CHANGE UP (ref 5–17)
APPEARANCE UR: ABNORMAL
AST SERPL-CCNC: 37 U/L — SIGNIFICANT CHANGE UP (ref 15–37)
BACTERIA # UR AUTO: ABNORMAL
BASOPHILS # BLD AUTO: 0.03 K/UL — SIGNIFICANT CHANGE UP (ref 0–0.2)
BASOPHILS NFR BLD AUTO: 0.5 % — SIGNIFICANT CHANGE UP (ref 0–2)
BILIRUB SERPL-MCNC: 0.3 MG/DL — SIGNIFICANT CHANGE UP (ref 0.2–1.2)
BILIRUB UR-MCNC: ABNORMAL
BUN SERPL-MCNC: 15 MG/DL — SIGNIFICANT CHANGE UP (ref 7–23)
CALCIUM SERPL-MCNC: 10.5 MG/DL — HIGH (ref 8.5–10.1)
CHLORIDE SERPL-SCNC: 105 MMOL/L — SIGNIFICANT CHANGE UP (ref 96–108)
CO2 SERPL-SCNC: 27 MMOL/L — SIGNIFICANT CHANGE UP (ref 22–31)
COLOR SPEC: ABNORMAL
CREAT SERPL-MCNC: 0.98 MG/DL — SIGNIFICANT CHANGE UP (ref 0.5–1.3)
DIFF PNL FLD: ABNORMAL
EOSINOPHIL # BLD AUTO: 0.16 K/UL — SIGNIFICANT CHANGE UP (ref 0–0.5)
EOSINOPHIL NFR BLD AUTO: 2.5 % — SIGNIFICANT CHANGE UP (ref 0–6)
EPI CELLS # UR: SIGNIFICANT CHANGE UP
FLUAV AG NPH QL: SIGNIFICANT CHANGE UP
FLUBV AG NPH QL: SIGNIFICANT CHANGE UP
GLUCOSE SERPL-MCNC: 101 MG/DL — HIGH (ref 70–99)
GLUCOSE UR QL: NEGATIVE MG/DL — SIGNIFICANT CHANGE UP
HCT VFR BLD CALC: 40.3 % — SIGNIFICANT CHANGE UP (ref 34.5–45)
HGB BLD-MCNC: 12.9 G/DL — SIGNIFICANT CHANGE UP (ref 11.5–15.5)
IMM GRANULOCYTES NFR BLD AUTO: 0.3 % — SIGNIFICANT CHANGE UP (ref 0–1.5)
KETONES UR-MCNC: ABNORMAL
LEUKOCYTE ESTERASE UR-ACNC: ABNORMAL
LYMPHOCYTES # BLD AUTO: 1.83 K/UL — SIGNIFICANT CHANGE UP (ref 1–3.3)
LYMPHOCYTES # BLD AUTO: 28.9 % — SIGNIFICANT CHANGE UP (ref 13–44)
MCHC RBC-ENTMCNC: 29.6 PG — SIGNIFICANT CHANGE UP (ref 27–34)
MCHC RBC-ENTMCNC: 32 GM/DL — SIGNIFICANT CHANGE UP (ref 32–36)
MCV RBC AUTO: 92.4 FL — SIGNIFICANT CHANGE UP (ref 80–100)
MONOCYTES # BLD AUTO: 0.64 K/UL — SIGNIFICANT CHANGE UP (ref 0–0.9)
MONOCYTES NFR BLD AUTO: 10.1 % — SIGNIFICANT CHANGE UP (ref 2–14)
NEUTROPHILS # BLD AUTO: 3.66 K/UL — SIGNIFICANT CHANGE UP (ref 1.8–7.4)
NEUTROPHILS NFR BLD AUTO: 57.7 % — SIGNIFICANT CHANGE UP (ref 43–77)
NITRITE UR-MCNC: NEGATIVE — SIGNIFICANT CHANGE UP
NRBC # BLD: 0 /100 WBCS — SIGNIFICANT CHANGE UP (ref 0–0)
PH UR: 5 — SIGNIFICANT CHANGE UP (ref 5–8)
PLATELET # BLD AUTO: 240 K/UL — SIGNIFICANT CHANGE UP (ref 150–400)
POTASSIUM SERPL-MCNC: 4.6 MMOL/L — SIGNIFICANT CHANGE UP (ref 3.5–5.3)
POTASSIUM SERPL-SCNC: 4.6 MMOL/L — SIGNIFICANT CHANGE UP (ref 3.5–5.3)
PROT SERPL-MCNC: 7.6 GM/DL — SIGNIFICANT CHANGE UP (ref 6–8.3)
PROT UR-MCNC: 500 MG/DL
RBC # BLD: 4.36 M/UL — SIGNIFICANT CHANGE UP (ref 3.8–5.2)
RBC # FLD: 15 % — HIGH (ref 10.3–14.5)
RBC CASTS # UR COMP ASSIST: >50 /HPF (ref 0–4)
SARS-COV-2 RNA SPEC QL NAA+PROBE: SIGNIFICANT CHANGE UP
SODIUM SERPL-SCNC: 137 MMOL/L — SIGNIFICANT CHANGE UP (ref 135–145)
SP GR SPEC: 1.02 — SIGNIFICANT CHANGE UP (ref 1.01–1.02)
UROBILINOGEN FLD QL: NEGATIVE MG/DL — SIGNIFICANT CHANGE UP
WBC # BLD: 6.34 K/UL — SIGNIFICANT CHANGE UP (ref 3.8–10.5)
WBC # FLD AUTO: 6.34 K/UL — SIGNIFICANT CHANGE UP (ref 3.8–10.5)
WBC UR QL: ABNORMAL

## 2021-10-15 PROCEDURE — 99239 HOSP IP/OBS DSCHRG MGMT >30: CPT

## 2021-10-15 PROCEDURE — 99222 1ST HOSP IP/OBS MODERATE 55: CPT

## 2021-10-15 RX ORDER — LACOSAMIDE 50 MG/1
100 TABLET ORAL
Refills: 0 | Status: DISCONTINUED | OUTPATIENT
Start: 2021-10-15 | End: 2021-10-15

## 2021-10-15 RX ORDER — AMLODIPINE BESYLATE 2.5 MG/1
1 TABLET ORAL
Qty: 0 | Refills: 0 | DISCHARGE

## 2021-10-15 RX ORDER — FAMOTIDINE 10 MG/ML
1 INJECTION INTRAVENOUS
Qty: 0 | Refills: 0 | DISCHARGE

## 2021-10-15 RX ORDER — INFLUENZA VIRUS VACCINE 15; 15; 15; 15 UG/.5ML; UG/.5ML; UG/.5ML; UG/.5ML
0.5 SUSPENSION INTRAMUSCULAR ONCE
Refills: 0 | Status: DISCONTINUED | OUTPATIENT
Start: 2021-10-15 | End: 2021-10-15

## 2021-10-15 RX ORDER — MEMANTINE HYDROCHLORIDE 10 MG/1
10 TABLET ORAL DAILY
Refills: 0 | Status: DISCONTINUED | OUTPATIENT
Start: 2021-10-15 | End: 2021-10-15

## 2021-10-15 RX ORDER — ATORVASTATIN CALCIUM 80 MG/1
40 TABLET, FILM COATED ORAL AT BEDTIME
Refills: 0 | Status: DISCONTINUED | OUTPATIENT
Start: 2021-10-15 | End: 2021-10-15

## 2021-10-15 RX ORDER — APIXABAN 2.5 MG/1
5 TABLET, FILM COATED ORAL
Refills: 0 | Status: DISCONTINUED | OUTPATIENT
Start: 2021-10-15 | End: 2021-10-15

## 2021-10-15 RX ORDER — APIXABAN 2.5 MG/1
1 TABLET, FILM COATED ORAL
Qty: 0 | Refills: 0 | DISCHARGE
Start: 2021-10-15

## 2021-10-15 RX ORDER — LACOSAMIDE 50 MG/1
1 TABLET ORAL
Qty: 0 | Refills: 0 | DISCHARGE
Start: 2021-10-15

## 2021-10-15 RX ORDER — METOPROLOL TARTRATE 50 MG
50 TABLET ORAL
Refills: 0 | Status: DISCONTINUED | OUTPATIENT
Start: 2021-10-15 | End: 2021-10-15

## 2021-10-15 RX ORDER — SODIUM CHLORIDE 9 MG/ML
500 INJECTION, SOLUTION INTRAVENOUS
Refills: 0 | Status: COMPLETED | OUTPATIENT
Start: 2021-10-15 | End: 2021-10-15

## 2021-10-15 RX ORDER — METOPROLOL TARTRATE 50 MG
1 TABLET ORAL
Qty: 0 | Refills: 0 | DISCHARGE
Start: 2021-10-15

## 2021-10-15 RX ORDER — MECLIZINE HCL 12.5 MG
1 TABLET ORAL
Qty: 0 | Refills: 0 | DISCHARGE

## 2021-10-15 RX ADMIN — SODIUM CHLORIDE 50 MILLILITER(S): 9 INJECTION, SOLUTION INTRAVENOUS at 05:22

## 2021-10-15 NOTE — SWALLOW BEDSIDE ASSESSMENT ADULT - SWALLOW EVAL: ORAL MUSCULATURE
informal observation - labial droop on the right/unable to assess due to poor participation/comprehension

## 2021-10-15 NOTE — SWALLOW BEDSIDE ASSESSMENT ADULT - SWALLOW EVAL: DIAGNOSIS
oropharyngeal dysphagia marked by reduced cognition, decreased oral grading/labial seal with trace lateral loss on the right, slow oral transport posterior with suspected delay in swallow trigger and decrease hyolaryngeal elevation/excursion to palpation. no overt signs of aspiration with consistencies trialed.

## 2021-10-15 NOTE — H&P ADULT - ASSESSMENT
Patient is an 81F with a PMH of recent CVA (nonverbal, wheelchair bound), pAF on eliquis, seizures on vimpat, dementia HTN who presents to the ED for rehab placement.  Patient unable to provide history, daughter - Lola - at the bedside to provide collateral.  Patient reportedly was in [place] 2 months ago for a family event when she suffered a massive stroke.  Was admitted to a hospital in [place] and subsequently discharged to a rehab center there.  Patient was moved from [place] back to [place] yesterday, family brought patient in for rehab placement here.  Family noted blood in her urine starting from yesterday.  Family reports no other complaints.  Vitals stable, labs benign.  UA significant for RBCs.  Will admit to med surg.     IMPROVE VTE Individual Risk Assessment          RISK                                                          Points  [  ] Previous VTE                                                3  [  ] Thrombophilia                                             2  [  ] Lower limb paralysis                                    2        (unable to hold up >15 seconds)    [  ] Current Cancer                                             2         (within 6 months)  [  ] Immobilization > 24 hrs                              1  [  ] ICU/CCU stay > 24 hours                            1  [  ] Age > 60                                                    1    IMPROVE VTE Score - 1

## 2021-10-15 NOTE — ED PROVIDER NOTE - PHYSICAL EXAMINATION
Constitutional: Awake, alert. nonverbal.   ENMT: Airway patent.  Eyes: Clear bilaterally, pupils equal, round and reactive to light.  Cardiac: Normal rate, regular rhythm.  Heart sounds S1, S2.  Respiratory: Breath sounds clear and equal bilaterally.  Gastrointestinal: Abdomen soft, non-tender, no guarding.  Neurological: Alert, nonverbal, no focal deficits appreciated.   Skin: No evidence of rash.

## 2021-10-15 NOTE — DISCHARGE NOTE NURSING/CASE MANAGEMENT/SOCIAL WORK - PATIENT PORTAL LINK FT
You can access the FollowMyHealth Patient Portal offered by [facility] by registering at the following website: [email or web address]. By joining [employer]’s FollowMyHealth portal, you will also be able to view your health information using other applications (apps) compatible with our system.

## 2021-10-15 NOTE — H&P ADULT - PROBLEM SELECTOR PLAN 5
vimpat Will replace nguyen.  UA shows significant RBCs.   Patient on eliquis  Consider urology eval if hematuria persists

## 2021-10-15 NOTE — SWALLOW BEDSIDE ASSESSMENT ADULT - H & P REVIEW
Patient is an 81F with a PMH of recent CVA (nonverbal, wheelchair bound), pAF on eliquis, seizures on vimpat, dementia HTN who presents to the ED for rehab placement.  Patient unable to provide history, daughter - Lola - at the bedside to provide collateral.  Patient reportedly was in [place] 2 months ago for a family event when she suffered a massive stroke.  Was admitted to a hospital in [place] and subsequently discharged to a rehab center there.  Patient was moved from [place] back to [place] yesterday, family brought patient in for rehab placement here.  Family noted blood in her urine starting from yesterday.  Family reports no other complaints.  Vitals stable, labs benign.  UA significant for RBCs.  Will admit to med surg./yes

## 2021-10-15 NOTE — DISCHARGE NOTE PROVIDER - HOSPITAL COURSE
82 yo F with a PMH of recent CVA (nonverbal, wheelchair bound), chronic Green, pAF on Eliquis, seizures on Vimpat, dementia, HLD &  HTN who presented to the ED for rehab placement. Of note, family noted blood in her urine at home. In the ED, her Green was replaced and hematuria resolved. Her daughter found a rehab for her and she was discharged to the rehab.    Discharge time: 43 minutes

## 2021-10-15 NOTE — ED ADULT NURSE NOTE - OBJECTIVE STATEMENT
from [place]  nguyen catheter placed since August 2021  saw blood today  PMH stroke August, htn  dark red blood in urine  denies pain Patient presents to ED awake, alert and oriented. Patient is aphasic and from [place]  nguyen catheter placed since August 2021  saw blood today  PMH stroke August, htn  dark red blood in urine  denies pain Patient presents to ED awake, alert and oriented. Patient is aphasic and bed bound from stroke in August. As per daughter who is at bedside, patient stayed in the hospital for 2 months in [place]. Prior to the stroke, patient used to talk and walk. Nguyen catheter was placed during hospitalization in [place] in August. As per daughter, today is the first time there is blood in the nguyen catheter. Dark red blood noted in nguyen catheter. PMH stroke in August, htn.

## 2021-10-15 NOTE — H&P ADULT - PROBLEM SELECTOR PLAN 1
PT eval for functionality.  Family interested in rehab placement  Swallow eval as family unsure of diet.  NPO for now

## 2021-10-15 NOTE — PATIENT PROFILE ADULT - STATED REASON FOR ADMISSION
[facility]    Progress Note    Mineshedwardkelly Hui Patient Status:  Inpatient    3/22/1937 MRN [id number]   Location Baylor Scott & White McLane Children's need to find a rehab here and blood in [place]

## 2021-10-15 NOTE — DISCHARGE NOTE PROVIDER - NSDCMRMEDTOKEN_GEN_ALL_CORE_FT
apixaban 5 mg oral tablet: 1 tab(s) orally 2 times a day  aspirin 81 mg oral delayed release tablet: 1 tab(s) orally once a day  atorvastatin 40 mg oral tablet: 1 tab(s) orally once a day (at bedtime)  enalapril 20 mg oral tablet: 1 tab(s) orally once a day  famotidine 40 mg oral tablet: 1 tab(s) orally once a day (at bedtime)  folic acid 1 mg oral tablet: 1 tab(s) orally once a day  gabapentin 100 mg oral capsule: 1 cap(s) orally once a day  hydroCHLOROthiazide 25 mg oral tablet: 1 tab(s) orally once a day (in the morning)  lacosamide 100 mg oral tablet: 1 tab(s) orally 2 times a day  metoprolol tartrate 50 mg oral tablet: 1 tab(s) orally 2 times a day  montelukast 10 mg oral tablet: 1 tab(s) orally once a day  Plavix 75 mg oral tablet: 1 tab(s) orally once a day

## 2021-10-15 NOTE — H&P ADULT - NSHPLABSRESULTS_GEN_ALL_CORE
Recent Vitals  T(C): 36.2 (10-15-21 @ 03:51), Max: 36.3 (10-15-21 @ 00:03)  HR: 96 (10-15-21 @ 03:37) (93 - 96)  BP: 114/83 (10-15-21 @ 03:37) (103/71 - 114/83)  RR: 14 (10-15-21 @ 03:37) (14 - 17)  SpO2: 95% (10-15-21 @ 03:37) (93% - 95%)                        12.9   6.34  )-----------( 240      ( 15 Oct 2021 02:58 )             40.3     10-15    137  |  105  |  15  ----------------------------<  101<H>  4.6   |  27  |  0.98    Ca    10.5<H>      15 Oct 2021 02:58    TPro  7.6  /  Alb  2.7<L>  /  TBili  0.3  /  DBili  x   /  AST  37  /  ALT  18  /  AlkPhos  108  10-15      LIVER FUNCTIONS - ( 15 Oct 2021 02:58 )  Alb: 2.7 g/dL / Pro: 7.6 gm/dL / ALK PHOS: 108 U/L / ALT: 18 U/L / AST: 37 U/L / GGT: x           Urinalysis Basic - ( 15 Oct 2021 02:15 )    Color: Brown / Appearance: very cloudy / S.020 / pH: x  Gluc: x / Ketone: Trace  / Bili: Small / Urobili: Negative mg/dL   Blood: x / Protein: 500 mg/dL / Nitrite: Negative   Leuk Esterase: Moderate / RBC: >50 /HPF / WBC 6-10   Sq Epi: x / Non Sq Epi: Occasional / Bacteria: Few        Home Medications:  aspirin 81 mg oral delayed release tablet: 1 tab(s) orally once a day (2019 14:27)  enalapril 20 mg oral tablet: 1 tab(s) orally once a day (2019 14:27)  famotidine 40 mg oral tablet: 1 tab(s) orally once a day (2019 14:27)  folic acid 1 mg oral tablet: 1 tab(s) orally once a day (2019 14:27)  hydroCHLOROthiazide 12.5 mg oral capsule: 1 cap(s) orally once a day (2019 14:27)  montelukast 10 mg oral tablet: 1 tab(s) orally once a day (2019 14:27)

## 2021-10-15 NOTE — DISCHARGE NOTE PROVIDER - INSTRUCTIONS
A copy of the hand xray has been faxed to the nursing staff at [facility].   Honey Thick liquids

## 2021-10-15 NOTE — ED PROVIDER NOTE - CLINICAL SUMMARY MEDICAL DECISION MAKING FREE TEXT BOX
brought to ED for hematuria and rehab placement. No abdominal tenderness. Will send ua/uc. Will send labs and admit for rehab.

## 2021-10-15 NOTE — H&P ADULT - HISTORY OF PRESENT ILLNESS
Patient is an 81F with a PMH of recent CVA (nonverbal, wheelchair bound), pAF on eliquis, seizures on vimpat, dementia HTN who presents to the ED for rehab placement.  Patient unable to provide history, daughter - Lola - at the bedside to provide collateral.  Patient reportedly was in [place] 2 months ago for a family event when she suffered a massive stroke.  Was admitted to a hospital in [place] and subsequently discharged to a rehab center there.  Patient was moved from [place] back to [place] yesterday, family brought patient in for rehab placement here.  Family noted blood in her urine starting from yesterday.  Family reports no other complaints.  Vitals stable, labs benign.  UA significant for RBCs.  Will admit to med surg.

## 2021-10-15 NOTE — SWALLOW BEDSIDE ASSESSMENT ADULT - SLP GENERAL OBSERVATIONS
pt seen bedside, alert and oriented ?self - head nod to name. pt responded to simple yes no questions via head nod/shake or shoulder shrug - most communication nonverbal gestures including smile and eye contact. pt intermittently followed directions with model. noted some phonation

## 2021-10-15 NOTE — H&P ADULT - NSICDXPASTMEDICALHX_GEN_ALL_CORE_FT
PAST MEDICAL HISTORY:  CVA (cerebrovascular accident)     High cholesterol     HTN (hypertension)     Seizure disorder

## 2021-10-15 NOTE — ED PROVIDER NOTE - OBJECTIVE STATEMENT
81F hx cva ~2mos ago while in [place], was at a wedding, was subsequently hospitalized in [place] for 2 months, pt just arrived back to [place] today, daughter brought her to hospital to be placed in rehab facility. Also incidentally noted to have bloody output in her nguyen. Pt is nonverbal since her stroke and is wheelchair bound (walked prior).

## 2021-10-16 LAB
CULTURE RESULTS: SIGNIFICANT CHANGE UP
SPECIMEN SOURCE: SIGNIFICANT CHANGE UP

## 2021-10-21 DIAGNOSIS — Z91.011 ALLERGY TO MILK PRODUCTS: ICD-10-CM

## 2021-10-21 DIAGNOSIS — Z99.3 DEPENDENCE ON WHEELCHAIR: ICD-10-CM

## 2021-10-21 DIAGNOSIS — I48.0 PAROXYSMAL ATRIAL FIBRILLATION: ICD-10-CM

## 2021-10-21 DIAGNOSIS — Z79.01 LONG TERM (CURRENT) USE OF ANTICOAGULANTS: ICD-10-CM

## 2021-10-21 DIAGNOSIS — G40.909 EPILEPSY, UNSPECIFIED, NOT INTRACTABLE, WITHOUT STATUS EPILEPTICUS: ICD-10-CM

## 2021-10-21 DIAGNOSIS — R31.9 HEMATURIA, UNSPECIFIED: ICD-10-CM

## 2021-10-21 DIAGNOSIS — F02.80 DEMENTIA IN OTHER DISEASES CLASSIFIED ELSEWHERE, UNSPECIFIED SEVERITY, WITHOUT BEHAVIORAL DISTURBANCE, PSYCHOTIC DISTURBANCE, MOOD DISTURBANCE, AND ANXIETY: ICD-10-CM

## 2021-10-21 DIAGNOSIS — G30.9 ALZHEIMER'S DISEASE, UNSPECIFIED: ICD-10-CM

## 2021-10-21 DIAGNOSIS — R33.9 RETENTION OF URINE, UNSPECIFIED: ICD-10-CM

## 2021-10-21 DIAGNOSIS — E78.5 HYPERLIPIDEMIA, UNSPECIFIED: ICD-10-CM

## 2021-10-21 DIAGNOSIS — I69.320 APHASIA FOLLOWING CEREBRAL INFARCTION: ICD-10-CM

## 2021-10-21 DIAGNOSIS — I10 ESSENTIAL (PRIMARY) HYPERTENSION: ICD-10-CM

## 2022-01-25 NOTE — DISCHARGE NOTE PROVIDER - CARE PROVIDER_API CALL
Is This A New Presentation, Or A Follow-Up?: Skin Lesion How Severe Is Your Skin Lesion?: moderate Have Your Skin Lesions Been Treated?: not been treated Your PMD,   Phone: (   )    -  Fax: (   )    -  Follow Up Time:

## 2022-08-03 ENCOUNTER — EMERGENCY (EMERGENCY)
Facility: HOSPITAL | Age: 82
LOS: 0 days | Discharge: HOME | End: 2022-08-03
Attending: STUDENT IN AN ORGANIZED HEALTH CARE EDUCATION/TRAINING PROGRAM

## 2022-08-03 VITALS
DIASTOLIC BLOOD PRESSURE: 101 MMHG | TEMPERATURE: 98 F | HEART RATE: 67 BPM | RESPIRATION RATE: 17 BRPM | OXYGEN SATURATION: 96 % | SYSTOLIC BLOOD PRESSURE: 160 MMHG

## 2022-08-03 VITALS
TEMPERATURE: 98 F | SYSTOLIC BLOOD PRESSURE: 174 MMHG | OXYGEN SATURATION: 97 % | RESPIRATION RATE: 18 BRPM | WEIGHT: 175.05 LBS | DIASTOLIC BLOOD PRESSURE: 119 MMHG | HEART RATE: 75 BPM | HEIGHT: 62 IN

## 2022-08-03 DIAGNOSIS — Z79.01 LONG TERM (CURRENT) USE OF ANTICOAGULANTS: ICD-10-CM

## 2022-08-03 DIAGNOSIS — R51.9 HEADACHE, UNSPECIFIED: ICD-10-CM

## 2022-08-03 DIAGNOSIS — R10.84 GENERALIZED ABDOMINAL PAIN: ICD-10-CM

## 2022-08-03 DIAGNOSIS — R10.9 UNSPECIFIED ABDOMINAL PAIN: ICD-10-CM

## 2022-08-03 DIAGNOSIS — Z86.73 PERSONAL HISTORY OF TRANSIENT ISCHEMIC ATTACK (TIA), AND CEREBRAL INFARCTION WITHOUT RESIDUAL DEFICITS: ICD-10-CM

## 2022-08-03 DIAGNOSIS — Z91.011 ALLERGY TO MILK PRODUCTS: ICD-10-CM

## 2022-08-03 DIAGNOSIS — I10 ESSENTIAL (PRIMARY) HYPERTENSION: ICD-10-CM

## 2022-08-03 DIAGNOSIS — Z79.82 LONG TERM (CURRENT) USE OF ASPIRIN: ICD-10-CM

## 2022-08-03 DIAGNOSIS — I48.91 UNSPECIFIED ATRIAL FIBRILLATION: ICD-10-CM

## 2022-08-03 DIAGNOSIS — E78.00 PURE HYPERCHOLESTEROLEMIA, UNSPECIFIED: ICD-10-CM

## 2022-08-03 LAB
ALBUMIN SERPL ELPH-MCNC: 3.3 G/DL — SIGNIFICANT CHANGE UP (ref 3.3–5)
ALP SERPL-CCNC: 115 U/L — SIGNIFICANT CHANGE UP (ref 40–120)
ALT FLD-CCNC: 18 U/L — SIGNIFICANT CHANGE UP (ref 12–78)
ANION GAP SERPL CALC-SCNC: 6 MMOL/L — SIGNIFICANT CHANGE UP (ref 5–17)
APPEARANCE UR: CLEAR — SIGNIFICANT CHANGE UP
APTT BLD: 33.4 SEC — SIGNIFICANT CHANGE UP (ref 27.5–35.5)
AST SERPL-CCNC: 21 U/L — SIGNIFICANT CHANGE UP (ref 15–37)
BASOPHILS # BLD AUTO: 0.02 K/UL — SIGNIFICANT CHANGE UP (ref 0–0.2)
BASOPHILS NFR BLD AUTO: 0.3 % — SIGNIFICANT CHANGE UP (ref 0–2)
BILIRUB SERPL-MCNC: 0.4 MG/DL — SIGNIFICANT CHANGE UP (ref 0.2–1.2)
BILIRUB UR-MCNC: NEGATIVE — SIGNIFICANT CHANGE UP
BUN SERPL-MCNC: 20 MG/DL — SIGNIFICANT CHANGE UP (ref 7–23)
CALCIUM SERPL-MCNC: 9.8 MG/DL — SIGNIFICANT CHANGE UP (ref 8.5–10.1)
CHLORIDE SERPL-SCNC: 105 MMOL/L — SIGNIFICANT CHANGE UP (ref 96–108)
CO2 SERPL-SCNC: 27 MMOL/L — SIGNIFICANT CHANGE UP (ref 22–31)
COLOR SPEC: YELLOW — SIGNIFICANT CHANGE UP
CREAT SERPL-MCNC: 1.23 MG/DL — SIGNIFICANT CHANGE UP (ref 0.5–1.3)
DIFF PNL FLD: NEGATIVE — SIGNIFICANT CHANGE UP
EGFR: 44 ML/MIN/1.73M2 — LOW
EOSINOPHIL # BLD AUTO: 0.13 K/UL — SIGNIFICANT CHANGE UP (ref 0–0.5)
EOSINOPHIL NFR BLD AUTO: 2.2 % — SIGNIFICANT CHANGE UP (ref 0–6)
GLUCOSE SERPL-MCNC: 110 MG/DL — HIGH (ref 70–99)
GLUCOSE UR QL: NEGATIVE MG/DL — SIGNIFICANT CHANGE UP
HCT VFR BLD CALC: 42.5 % — SIGNIFICANT CHANGE UP (ref 34.5–45)
HGB BLD-MCNC: 13.4 G/DL — SIGNIFICANT CHANGE UP (ref 11.5–15.5)
IMM GRANULOCYTES NFR BLD AUTO: 0.2 % — SIGNIFICANT CHANGE UP (ref 0–1.5)
INR BLD: 1.12 RATIO — SIGNIFICANT CHANGE UP (ref 0.88–1.16)
KETONES UR-MCNC: NEGATIVE — SIGNIFICANT CHANGE UP
LACTATE SERPL-SCNC: 1.2 MMOL/L — SIGNIFICANT CHANGE UP (ref 0.7–2)
LEUKOCYTE ESTERASE UR-ACNC: NEGATIVE — SIGNIFICANT CHANGE UP
LIDOCAIN IGE QN: 127 U/L — SIGNIFICANT CHANGE UP (ref 73–393)
LYMPHOCYTES # BLD AUTO: 1.73 K/UL — SIGNIFICANT CHANGE UP (ref 1–3.3)
LYMPHOCYTES # BLD AUTO: 29.1 % — SIGNIFICANT CHANGE UP (ref 13–44)
MCHC RBC-ENTMCNC: 26.7 PG — LOW (ref 27–34)
MCHC RBC-ENTMCNC: 31.5 G/DL — LOW (ref 32–36)
MCV RBC AUTO: 84.8 FL — SIGNIFICANT CHANGE UP (ref 80–100)
MONOCYTES # BLD AUTO: 0.59 K/UL — SIGNIFICANT CHANGE UP (ref 0–0.9)
MONOCYTES NFR BLD AUTO: 9.9 % — SIGNIFICANT CHANGE UP (ref 2–14)
NEUTROPHILS # BLD AUTO: 3.47 K/UL — SIGNIFICANT CHANGE UP (ref 1.8–7.4)
NEUTROPHILS NFR BLD AUTO: 58.3 % — SIGNIFICANT CHANGE UP (ref 43–77)
NITRITE UR-MCNC: NEGATIVE — SIGNIFICANT CHANGE UP
NRBC # BLD: 0 /100 WBCS — SIGNIFICANT CHANGE UP (ref 0–0)
PH UR: 8 — SIGNIFICANT CHANGE UP (ref 5–8)
PLATELET # BLD AUTO: 202 K/UL — SIGNIFICANT CHANGE UP (ref 150–400)
POTASSIUM SERPL-MCNC: 4.3 MMOL/L — SIGNIFICANT CHANGE UP (ref 3.5–5.3)
POTASSIUM SERPL-SCNC: 4.3 MMOL/L — SIGNIFICANT CHANGE UP (ref 3.5–5.3)
PROT SERPL-MCNC: 8.3 GM/DL — SIGNIFICANT CHANGE UP (ref 6–8.3)
PROT UR-MCNC: NEGATIVE MG/DL — SIGNIFICANT CHANGE UP
PROTHROM AB SERPL-ACNC: 13.4 SEC — SIGNIFICANT CHANGE UP (ref 10.5–13.4)
RBC # BLD: 5.01 M/UL — SIGNIFICANT CHANGE UP (ref 3.8–5.2)
RBC # FLD: 15.5 % — HIGH (ref 10.3–14.5)
RBC CASTS # UR COMP ASSIST: NEGATIVE /HPF — SIGNIFICANT CHANGE UP (ref 0–4)
SODIUM SERPL-SCNC: 138 MMOL/L — SIGNIFICANT CHANGE UP (ref 135–145)
SP GR SPEC: 1.01 — SIGNIFICANT CHANGE UP (ref 1.01–1.02)
TROPONIN I, HIGH SENSITIVITY RESULT: 9.1 NG/L — SIGNIFICANT CHANGE UP
UROBILINOGEN FLD QL: NEGATIVE MG/DL — SIGNIFICANT CHANGE UP
WBC # BLD: 5.95 K/UL — SIGNIFICANT CHANGE UP (ref 3.8–10.5)
WBC # FLD AUTO: 5.95 K/UL — SIGNIFICANT CHANGE UP (ref 3.8–10.5)
WBC UR QL: NEGATIVE — SIGNIFICANT CHANGE UP

## 2022-08-03 PROCEDURE — 93010 ELECTROCARDIOGRAM REPORT: CPT

## 2022-08-03 PROCEDURE — 70450 CT HEAD/BRAIN W/O DYE: CPT | Mod: 26,MA

## 2022-08-03 PROCEDURE — 99285 EMERGENCY DEPT VISIT HI MDM: CPT

## 2022-08-03 PROCEDURE — 74177 CT ABD & PELVIS W/CONTRAST: CPT | Mod: 26,MA

## 2022-08-03 PROCEDURE — 71045 X-RAY EXAM CHEST 1 VIEW: CPT | Mod: 26

## 2022-08-03 RX ORDER — ACETAMINOPHEN 500 MG
1000 TABLET ORAL ONCE
Refills: 0 | Status: COMPLETED | OUTPATIENT
Start: 2022-08-03 | End: 2022-08-03

## 2022-08-03 RX ORDER — ASPIRIN/CALCIUM CARB/MAGNESIUM 324 MG
1 TABLET ORAL
Qty: 0 | Refills: 0 | DISCHARGE

## 2022-08-03 RX ORDER — ATORVASTATIN CALCIUM 80 MG/1
1 TABLET, FILM COATED ORAL
Qty: 30 | Refills: 0

## 2022-08-03 RX ORDER — FOLIC ACID 0.8 MG
1 TABLET ORAL
Qty: 0 | Refills: 0 | DISCHARGE

## 2022-08-03 RX ORDER — METOPROLOL TARTRATE 50 MG
50 TABLET ORAL ONCE
Refills: 0 | Status: COMPLETED | OUTPATIENT
Start: 2022-08-03 | End: 2022-08-03

## 2022-08-03 RX ORDER — MONTELUKAST 4 MG/1
1 TABLET, CHEWABLE ORAL
Qty: 0 | Refills: 0 | DISCHARGE

## 2022-08-03 RX ORDER — GABAPENTIN 400 MG/1
1 CAPSULE ORAL
Qty: 0 | Refills: 0 | DISCHARGE

## 2022-08-03 RX ORDER — CLOPIDOGREL BISULFATE 75 MG/1
1 TABLET, FILM COATED ORAL
Qty: 0 | Refills: 0 | DISCHARGE

## 2022-08-03 RX ORDER — FAMOTIDINE 10 MG/ML
1 INJECTION INTRAVENOUS
Qty: 0 | Refills: 0 | DISCHARGE

## 2022-08-03 RX ADMIN — Medication 400 MILLIGRAM(S): at 18:40

## 2022-08-03 RX ADMIN — Medication 50 MILLIGRAM(S): at 22:24

## 2022-08-03 NOTE — ED PROVIDER NOTE - PHYSICAL EXAMINATION
GENERAL: Awake, alert, NAD  HEENT: NC/AT, moist mucous membranes  LUNGS: CTAB, no wheezes or crackles   CARDIAC: RRR, no m/r/g  ABDOMEN: Soft, normal BS, mild diffuse abd tenderness, non distended, no rebound, no guarding  EXT: No edema, no calf tenderness, 2+ DP pulses bilaterally, no deformities.  NEURO: Nonverbal per baseline. Moving all extremities.  SKIN: Warm and dry. No rash.  PSYCH: Normal affect.

## 2022-08-03 NOTE — ED ADULT NURSE NOTE - NSIMPLEMENTINTERV_GEN_ALL_ED
Implemented All Fall Risk Interventions:  [place] to call system. Call bell, personal items and telephone within reach. Instruct patient to call for assistance. Room bathroom lighting operational. Non-slip footwear when patient is off stretcher. Physically safe environment: no spills, clutter or unnecessary equipment. Stretcher in lowest position, wheels locked, appropriate side rails in place. Provide visual cue, wrist band, yellow gown, etc. Monitor gait and stability. Monitor for mental status changes and reorient to person, place, and time. Review medications for side effects contributing to fall risk. Reinforce activity limits and safety measures with patient and family.

## 2022-08-03 NOTE — ED PROVIDER NOTE - OBJECTIVE STATEMENT
83 y/o F with PMH HTN, HLD, CVA, nonverbal at baseline presenting to the ED with possible headache and abdominal pain. Per daughter, patient was initially complaining of headache this morning and then began to complain of abdominal pain throughout the day. She has no vomiting, diarrhea, fever, or chills. Patient is non verbal but points to abdomen if asked if she has pain. Patient intermittently points to head when asked if she is having headache.

## 2022-08-03 NOTE — ED PROVIDER NOTE - PATIENT PORTAL LINK FT
You can access the FollowMyHealth Patient Portal offered by [facility] by registering at the following website: [email or web address]. By joining [employer]’s FollowMyHealth portal, you will also be able to view your health information using other applications (apps) compatible with our system.

## 2022-08-03 NOTE — ED PROVIDER NOTE - CLINICAL SUMMARY MEDICAL DECISION MAKING FREE TEXT BOX
81 y/o F with PMH HTN, HLD, CVA, nonverbal at baseline presenting to the ED with possible headache and abdominal pain. Vitals significant for HTN. Patient is well appearing in NAD. Her exam has mild abd tenderness. In setting of headache and abd pain, plan for labs, CT head, and CT AP to evaluate for acute pathology. Reassess.

## 2022-08-03 NOTE — ED PROVIDER NOTE - NS ED ROS FT
CONST: no fevers, no chills  EYES: no pain, no vision changes  ENT: no sore throat, no ear pain, no change in hearing  CV: no chest pain, no leg swelling  RESP: no shortness of breath, no cough  ABD: + abdominal pain, no nausea, no vomiting, no diarrhea  : no dysuria, no flank pain, no hematuria  MSK: no back pain, no extremity pain  NEURO: + headache or additional neurologic complaints  HEME: no easy bleeding  SKIN:  no rash

## 2022-08-03 NOTE — ED PROVIDER NOTE - PROGRESS NOTE DETAILS
Patient care signed out by Dr. Adams, patient minimally verbal c/o headache and abdominal pain at home, [place] speaking, daughter at bedside, patient at baseline mental status, due for PM metoprolol.  Abdomen benign, CT imaging, labs, UA, negative, patient cheery and asking for food, presentation not raising concern for acute CVA or seizure, neuro exam intact, daughter says that they just obtained insurance clearance for vimpat.  EKG with afib, has known afib on eliquis.  Ok to dc, result d/w daughter, advised to follow up with PMD, return to ER if sx worse.

## 2022-08-04 PROBLEM — G40.909 EPILEPSY, UNSPECIFIED, NOT INTRACTABLE, WITHOUT STATUS EPILEPTICUS: Chronic | Status: ACTIVE | Noted: 2021-10-15

## 2022-08-04 PROBLEM — I63.9 CEREBRAL INFARCTION, UNSPECIFIED: Chronic | Status: ACTIVE | Noted: 2021-10-15

## 2022-08-05 LAB
CULTURE RESULTS: NO GROWTH — SIGNIFICANT CHANGE UP
SPECIMEN SOURCE: SIGNIFICANT CHANGE UP

## 2023-02-01 NOTE — ED ADULT NURSE NOTE - NSSUHOSCREENINGYN_ED_ALL_ED
[facility] - Observation [address] [place] [address] Medicine  Progress Note    Patient Name: Hao Medrano  MRN: [id number]  Patient Class: IP- Inpatient   Admission Date: 1/25/2023  Length of Stay: 5 days  Attending Physician: Tommy Rhodes MD  Primary Care Provider: Keara Yanes NP        Subjective:     Principal Problem:Dislodged gastrostomy tube        HPI:  Hao Medrano is a 63 y.o. male with a past medical history of CVA with trach and G-tube, CKD, HTN, and HLD who is being palced in observation for further management of dislodged peg tube. Patient presented to the ED from [facility]. Patient is nonverbal, history obtained from chart and ED staff. Per the ED, the nursing home reports they noticed the dislodged ped tube around 0400 this am. Patient was recently admitted on 1/10 for same issue with a new peg placed on 1/12.    ED: vital signs stable, afebrile. Leukocytosis of 23K. Hb 11.4. PT/INR 10/9/1.0. Furhter labs pending at time of admission. CXR with inconclusive findings due to patient position. Blood cultures obtained. GI consulted and plan for EGD with G-tube placement tomorrow.       Overview/Hospital Course:  S/p 20 F PEG tube placed without any immediate complications.   External bumper at 3 cm shelby.  Positive blood Cx with gram positive Cocci  2D echo ordered  Cardiology consult for HARDEEP per ID recommendation  Outpatient Antibiotic Therapy Plan:     Please send referral to [facility].     1) Infection: E faecalis bacteremia     2) Discharge Antibiotics:     Intravenous antibiotics:   Ampicillin 2 grams IV q 4 hours (may be given as continuous infusion)        3) Therapy Duration:  14 days     Estimated end date of IV antibiotics: 02/24/23     4) Outpatient Weekly Labs:     Order the following labs to be drawn on Mondays:    CBC   CMP      5) Fax Lab Results to Infectious Diseases Provider: Dr. Price     [facility] Fax Number:  [phone]     6) Outpatient Infectious Diseases Follow-up      Follow-up appointment will be arranged by the ID clinic and will be found in the patient's appointments tab.      Prior to discharge, please ensure the patient's follow-up has been scheduled.     If there is still no follow-up scheduled prior to discharge, please send an EPIC message to Lauren Stiles in Infectious Diseases.    Patient was admitted for dislodged PEG tube. GI was consulted and he underwent 20 F PEG tube placed without any immediate complications. External bumper at 3 cm shelby. During the course of this admission serial routine labs with mild hyperglycemia, mild anemia, persistence leukocytosis. He was found to have positive blood Culture with enterococcus Faecalis and coagulase negative staph. ID was consulted and was started on IV antibiotic and outpatient antibiotic see recommendation above. Throughout hospital stays, no new acute changes. He will be transferred back to the nursing home and follow up with ID, PCP.      Interval History:   Non verbal, awaiting for placement for IV antibiotic, no acute changes or ne acute concern.    Review of Systems   Unable to perform ROS: Patient nonverbal   Objective:     Vital Signs (Most Recent):  Temp: 97.8 °F (36.6 °C) (01/31/23 1641)  Pulse: 109 (01/31/23 1641)  Resp: 18 (01/31/23 1641)  BP: 130/88 (01/31/23 1641)  SpO2: 97 % (01/31/23 1641) Vital Signs (24h Range):  Temp:  [97.8 °F (36.6 °C)-98.5 °F (36.9 °C)] 97.8 °F (36.6 °C)  Pulse:  [] 109  Resp:  [16-20] 18  SpO2:  [96 %-100 %] 97 %  BP: (111-138)/(59-88) 130/88     Weight: 71.7 kg (158 lb)  Body mass index is 21.43 kg/m².    Intake/Output Summary (Last 24 hours) at 1/31/2023 1822  Last data filed at 1/31/2023 0554  Gross per 24 hour   Intake 500 ml   Output 650 ml   Net -150 ml      Physical Exam  Vitals and nursing note reviewed.   Constitutional:       General: He is not in acute distress.  HENT:      Head: Normocephalic and  atraumatic.   Eyes:      Extraocular Movements: Extraocular movements intact.      Pupils: Pupils are equal, round, and reactive to light.   Neck:      Comments: Track in place  Cardiovascular:      Rate and Rhythm: Tachycardia present. Rhythm irregular.      Heart sounds: Murmur heard.     No friction rub. No gallop.   Pulmonary:      Effort: Pulmonary effort is normal. No respiratory distress.      Breath sounds: Rhonchi present.   Chest:      Chest wall: No tenderness.   Abdominal:      General: Bowel sounds are normal. There is no distension.      Palpations: Abdomen is soft.      Tenderness: There is no abdominal tenderness. There is no guarding or rebound.      Comments: PEG tube in place   Musculoskeletal:         General: Swelling and deformity present.      Cervical back: No rigidity or tenderness.      Right lower leg: Edema present.      Left lower leg: Edema present.   Skin:     Findings: Erythema, lesion and rash present.   Neurological:      Mental Status: He is alert.      Comments: Hemiplegia with B/L lower ext atrophy, bed bound       Significant Labs: All pertinent labs within the past 24 hours have been reviewed.  BMP:   Recent Labs   Lab 01/31/23  0231      *   K 3.9      CO2 28   BUN 20   CREATININE 0.8   CALCIUM 9.4   MG 1.6     CBC:   Recent Labs   Lab 01/30/23  0224 01/31/23  0231   WBC 16.71* 15.00*   HGB 10.1* 9.9*   HCT 33.4* 32.2*   * 478*     Recent Lab Results  (Last 5 results in the past 24 hours)        01/31/23  1641   01/31/23  1025   01/31/23  0839   01/31/23  0231   01/30/23  2139        Albumin       2.0         Alkaline Phosphatase       149         ALT       13         Anion Gap       7         AST       17         Baso #       0.05         Basophil %       0.3         BILIRUBIN TOTAL       0.1  Comment: For infants and newborns, interpretation of results should be based  on gestational age, weight and in agreement with  clinical  observations.    Premature Infant recommended reference ranges:  Up to 24 hours.............<8.0 mg/dL  Up to 48 hours............<12.0 mg/dL  3-5 days..................<15.0 mg/dL  6-29 days.................<15.0 mg/dL           BUN       20         Calcium       9.4         Chloride       100         CO2       28         Creatinine       0.8         Differential Method       Automated         eGFR       >60.0         Eos #       1.8         Eosinophil %       11.7         Glucose       100         Gran # (ANC)       10.1         Gran %       67.1         Hematocrit       32.2         Hemoglobin       9.9         Immature Grans (Abs)       0.10  Comment: Mild elevation in immature granulocytes is non specific and   can be seen in a variety of conditions including stress response,   acute inflammation, trauma and pregnancy. Correlation with other   laboratory and clinical findings is essential.           Immature Granulocytes       0.7         Lymph #       2.2         Lymph %       14.5         Magnesium       1.6         MCH       27.0         MCHC       30.7         MCV       88         Mono #       0.9         Mono %       5.7         MPV       9.8         nRBC       0         Phosphorus       2.4         Platelets       478         POCT Glucose 229   93   165     201       Potassium       3.9         PROTEIN TOTAL       7.2         RBC       3.67         RDW       15.9         Sodium       135         WBC       15.00                                Significant Imaging: I have reviewed all pertinent imaging results/findings within the past 24 hours.      Assessment/Plan:      * Dislodged gastrostomy tube  Oropharyngeal dysphagia    - s/p 20 F PEG tube placed without any immediate complications.   External bumper at 3 cm shelby.-  -resume feeding tube with Isosource 1.5 nathaniel and dietician consult    Bacteremia  Cont ampicillin will be DC on ampicillin for 14 days  ID consulted appreciated   2D echo done no  vegetations cont medical management        Multiple wounds of skin  With scattered lesions with dry skin  Encourage moisturize skin, skin care  Avoid skin debridement, changing position q 2Hrs  Cont Antibiotic ointment at the PEG site      On antiepileptic therapy  - continue keppra 750 mg BID once peg tube is replaced      HTN (hypertension)  - BP well controlled upon admission  - continue coreg 3.125 mg BID once peg tube is replaced      Leukocytosis  - VSSAF  - leukocytosis improving R/O steroids induced  - g-tube site is clean without discharge or erythma  - blood cultures positive for gram + cocci, repeat no growth  Cont IV ABx with ampicillin    Type 2 diabetes mellitus, with long-term current use of insulin  Patient's FSGs are controlled on current medication regimen.  Last A1c reviewed-   Lab Results   Component Value Date    HGBA1C 5.9 (H) 07/12/2018     Most recent fingerstick glucose reviewed- No results for input(s): POCTGLUCOSE in the last 24 hours.  Current correctional scale  Low  Maintain anti-hyperglycemic dose as follows-   Antihyperglycemics (From admission, onward)    Start     Stop Route Frequency Ordered    01/25/23 1511  insulin aspart U-100 pen 0-5 Units         -- SubQ Before meals & nightly PRN 01/25/23 1411        Hold Oral hypoglycemics while patient is in the hospital.  - patient is on insulin glargine 15U daily at the nursing home  - resume when tube feedings are resumed    Oropharyngeal dysphagia  -Cont tube feeding      Anemia  - patient's anemia is currently controlled  - etiology likely due to anemia of chronic disease  - current CBC reviewed -   Lab Results   Component Value Date    HGB 10.2 (L) 01/26/2023    HCT 34.0 (L) 01/26/2023     - monitor serial CBC and transfuse if patient becomes hemodynamically unstable, symptomatic, or H/H drops below 7/21.         Chronic kidney disease, stage III (moderate)  - baseline ~1.3  - cont monitor      VTE Risk Mitigation (From admission,  onward)         Ordered     Place sequential compression device  Until discontinued         01/25/23 1411     IP VTE HIGH RISK PATIENT  Once         01/25/23 1411     Reason for No Pharmacological VTE Prophylaxis  Once        Question:  Reasons:  Answer:  Risk of Bleeding  Comment:  pre-op    01/25/23 1411                Discharge Planning   FAINA: 1/31/2023     Code Status: Full Code   Is the patient medically ready for discharge?: No    Reason for patient still in hospital (select all that apply): Patient trending condition, Treatment and Pending disposition  Discharge Plan A: Long-term acute care facility (LTAC)        Time spent >30 minutes          Tommy Rhodes MD  Department of Hospital Medicine   [facility] - Observation 11H     Yes - the patient is able to be screened

## 2024-06-05 NOTE — ED PROVIDER NOTE - DOMESTIC TRAVEL HIGH RISK QUESTION
Called patient to schedule with him per request of Antelmo Carrera MD. Patient did not answer and voicemail box is full.     Troy Harmon, Pharm. D., BCACP  Medication Therapy Management Pharmacist    No

## 2025-01-01 ENCOUNTER — RESULT REVIEW (OUTPATIENT)
Age: 85
End: 2025-01-01

## 2025-01-01 ENCOUNTER — INPATIENT (INPATIENT)
Facility: HOSPITAL | Age: 85
LOS: 0 days | DRG: 176 | End: 2025-07-05
Attending: HOSPITALIST | Admitting: HOSPITALIST
Payer: MEDICARE

## 2025-01-01 ENCOUNTER — APPOINTMENT (OUTPATIENT)
Dept: NEUROLOGY | Facility: CLINIC | Age: 85
End: 2025-01-01

## 2025-01-01 ENCOUNTER — EMERGENCY (EMERGENCY)
Facility: HOSPITAL | Age: 85
LOS: 0 days | Discharge: ANOTHER HOSPITAL | End: 2025-07-04
Attending: EMERGENCY MEDICINE
Payer: MEDICARE

## 2025-01-01 VITALS
WEIGHT: 216.93 LBS | RESPIRATION RATE: 20 BRPM | OXYGEN SATURATION: 98 % | DIASTOLIC BLOOD PRESSURE: 80 MMHG | SYSTOLIC BLOOD PRESSURE: 126 MMHG | HEIGHT: 64 IN | HEART RATE: 77 BPM | TEMPERATURE: 97 F

## 2025-01-01 VITALS
TEMPERATURE: 98 F | OXYGEN SATURATION: 100 % | HEART RATE: 91 BPM | RESPIRATION RATE: 22 BRPM | DIASTOLIC BLOOD PRESSURE: 75 MMHG | SYSTOLIC BLOOD PRESSURE: 101 MMHG

## 2025-01-01 VITALS
HEART RATE: 99 BPM | RESPIRATION RATE: 26 BRPM | OXYGEN SATURATION: 100 % | SYSTOLIC BLOOD PRESSURE: 133 MMHG | TEMPERATURE: 98 F | DIASTOLIC BLOOD PRESSURE: 103 MMHG

## 2025-01-01 VITALS
HEART RATE: 108 BPM | TEMPERATURE: 97 F | SYSTOLIC BLOOD PRESSURE: 134 MMHG | RESPIRATION RATE: 30 BRPM | HEIGHT: 64 IN | OXYGEN SATURATION: 97 % | WEIGHT: 220.46 LBS | DIASTOLIC BLOOD PRESSURE: 104 MMHG

## 2025-01-01 DIAGNOSIS — Z91.011 ALLERGY TO MILK PRODUCTS: ICD-10-CM

## 2025-01-01 DIAGNOSIS — Z86.73 PERSONAL HISTORY OF TRANSIENT ISCHEMIC ATTACK (TIA), AND CEREBRAL INFARCTION WITHOUT RESIDUAL DEFICITS: ICD-10-CM

## 2025-01-01 DIAGNOSIS — I26.99 OTHER PULMONARY EMBOLISM WITHOUT ACUTE COR PULMONALE: ICD-10-CM

## 2025-01-01 DIAGNOSIS — R09.89 OTHER SPECIFIED SYMPTOMS AND SIGNS INVOLVING THE CIRCULATORY AND RESPIRATORY SYSTEMS: ICD-10-CM

## 2025-01-01 DIAGNOSIS — Z66 DO NOT RESUSCITATE: ICD-10-CM

## 2025-01-01 DIAGNOSIS — J96.01 ACUTE RESPIRATORY FAILURE WITH HYPOXIA: ICD-10-CM

## 2025-01-01 DIAGNOSIS — G40.909 EPILEPSY, UNSPECIFIED, NOT INTRACTABLE, WITHOUT STATUS EPILEPTICUS: ICD-10-CM

## 2025-01-01 DIAGNOSIS — F03.90 UNSPECIFIED DEMENTIA, UNSPECIFIED SEVERITY, WITHOUT BEHAVIORAL DISTURBANCE, PSYCHOTIC DISTURBANCE, MOOD DISTURBANCE, AND ANXIETY: ICD-10-CM

## 2025-01-01 DIAGNOSIS — Z51.5 ENCOUNTER FOR PALLIATIVE CARE: ICD-10-CM

## 2025-01-01 DIAGNOSIS — I11.9 HYPERTENSIVE HEART DISEASE WITHOUT HEART FAILURE: ICD-10-CM

## 2025-01-01 DIAGNOSIS — Z79.01 LONG TERM (CURRENT) USE OF ANTICOAGULANTS: ICD-10-CM

## 2025-01-01 DIAGNOSIS — R09.02 HYPOXEMIA: ICD-10-CM

## 2025-01-01 DIAGNOSIS — Z71.89 OTHER SPECIFIED COUNSELING: ICD-10-CM

## 2025-01-01 DIAGNOSIS — E78.5 HYPERLIPIDEMIA, UNSPECIFIED: ICD-10-CM

## 2025-01-01 DIAGNOSIS — R06.02 SHORTNESS OF BREATH: ICD-10-CM

## 2025-01-01 DIAGNOSIS — I48.91 UNSPECIFIED ATRIAL FIBRILLATION: ICD-10-CM

## 2025-01-01 LAB
ALBUMIN SERPL ELPH-MCNC: 3 G/DL — LOW (ref 3.3–5)
ALP SERPL-CCNC: 141 U/L — HIGH (ref 40–120)
ALT FLD-CCNC: 22 U/L — SIGNIFICANT CHANGE UP (ref 12–78)
ANION GAP SERPL CALC-SCNC: 11 MMOL/L — SIGNIFICANT CHANGE UP (ref 5–17)
APTT BLD: 27.3 SEC — SIGNIFICANT CHANGE UP (ref 26.1–36.8)
APTT BLD: >200 SEC — CRITICAL HIGH (ref 26.1–36.8)
AST SERPL-CCNC: 21 U/L — SIGNIFICANT CHANGE UP (ref 15–37)
BASOPHILS # BLD AUTO: 0.02 K/UL — SIGNIFICANT CHANGE UP (ref 0–0.2)
BASOPHILS NFR BLD AUTO: 0.2 % — SIGNIFICANT CHANGE UP (ref 0–2)
BILIRUB SERPL-MCNC: 0.6 MG/DL — SIGNIFICANT CHANGE UP (ref 0.2–1.2)
BUN SERPL-MCNC: 35 MG/DL — HIGH (ref 7–23)
CALCIUM SERPL-MCNC: 9.1 MG/DL — SIGNIFICANT CHANGE UP (ref 8.5–10.1)
CHLORIDE SERPL-SCNC: 110 MMOL/L — HIGH (ref 96–108)
CO2 SERPL-SCNC: 20 MMOL/L — LOW (ref 22–31)
CREAT SERPL-MCNC: 1.68 MG/DL — HIGH (ref 0.5–1.3)
EGFR: 30 ML/MIN/1.73M2 — LOW
EGFR: 30 ML/MIN/1.73M2 — LOW
EOSINOPHIL # BLD AUTO: 0.04 K/UL — SIGNIFICANT CHANGE UP (ref 0–0.5)
EOSINOPHIL NFR BLD AUTO: 0.4 % — SIGNIFICANT CHANGE UP (ref 0–6)
FLUAV AG NPH QL: SIGNIFICANT CHANGE UP
FLUBV AG NPH QL: SIGNIFICANT CHANGE UP
GAS PNL BLDV: SIGNIFICANT CHANGE UP
GLUCOSE SERPL-MCNC: 117 MG/DL — HIGH (ref 70–99)
HCT VFR BLD CALC: 41.8 % — SIGNIFICANT CHANGE UP (ref 34.5–45)
HGB BLD-MCNC: 13.4 G/DL — SIGNIFICANT CHANGE UP (ref 11.5–15.5)
IMM GRANULOCYTES NFR BLD AUTO: 0.7 % — SIGNIFICANT CHANGE UP (ref 0–0.9)
INR BLD: 1.38 RATIO — HIGH (ref 0.85–1.16)
INR BLD: 1.48 RATIO — HIGH (ref 0.85–1.16)
LYMPHOCYTES # BLD AUTO: 1.89 K/UL — SIGNIFICANT CHANGE UP (ref 1–3.3)
LYMPHOCYTES # BLD AUTO: 19.9 % — SIGNIFICANT CHANGE UP (ref 13–44)
MCHC RBC-ENTMCNC: 28.8 PG — SIGNIFICANT CHANGE UP (ref 27–34)
MCHC RBC-ENTMCNC: 32.1 G/DL — SIGNIFICANT CHANGE UP (ref 32–36)
MCV RBC AUTO: 89.7 FL — SIGNIFICANT CHANGE UP (ref 80–100)
MONOCYTES # BLD AUTO: 0.94 K/UL — HIGH (ref 0–0.9)
MONOCYTES NFR BLD AUTO: 9.9 % — SIGNIFICANT CHANGE UP (ref 2–14)
NEUTROPHILS # BLD AUTO: 6.54 K/UL — SIGNIFICANT CHANGE UP (ref 1.8–7.4)
NEUTROPHILS NFR BLD AUTO: 68.9 % — SIGNIFICANT CHANGE UP (ref 43–77)
NRBC BLD AUTO-RTO: 2 /100 WBCS — HIGH (ref 0–0)
NT-PROBNP SERPL-SCNC: 7682 PG/ML — HIGH (ref 0–450)
PLATELET # BLD AUTO: 91 K/UL — LOW (ref 150–400)
POTASSIUM SERPL-MCNC: 3.8 MMOL/L — SIGNIFICANT CHANGE UP (ref 3.5–5.3)
POTASSIUM SERPL-SCNC: 3.8 MMOL/L — SIGNIFICANT CHANGE UP (ref 3.5–5.3)
PROT SERPL-MCNC: 7.6 GM/DL — SIGNIFICANT CHANGE UP (ref 6–8.3)
PROTHROM AB SERPL-ACNC: 15.9 SEC — HIGH (ref 9.9–13.4)
PROTHROM AB SERPL-ACNC: 17 SEC — HIGH (ref 9.9–13.4)
RBC # BLD: 4.66 M/UL — SIGNIFICANT CHANGE UP (ref 3.8–5.2)
RBC # FLD: 14.9 % — HIGH (ref 10.3–14.5)
RSV RNA NPH QL NAA+NON-PROBE: SIGNIFICANT CHANGE UP
SARS-COV-2 RNA SPEC QL NAA+PROBE: SIGNIFICANT CHANGE UP
SODIUM SERPL-SCNC: 141 MMOL/L — SIGNIFICANT CHANGE UP (ref 135–145)
SOURCE RESPIRATORY: SIGNIFICANT CHANGE UP
TROPONIN I, HIGH SENSITIVITY RESULT: 65.3 NG/L — HIGH
TROPONIN I, HIGH SENSITIVITY RESULT: 68.1 NG/L — HIGH
WBC # BLD: 9.5 K/UL — SIGNIFICANT CHANGE UP (ref 3.8–10.5)
WBC # FLD AUTO: 9.5 K/UL — SIGNIFICANT CHANGE UP (ref 3.8–10.5)

## 2025-01-01 PROCEDURE — 82803 BLOOD GASES ANY COMBINATION: CPT

## 2025-01-01 PROCEDURE — 82947 ASSAY GLUCOSE BLOOD QUANT: CPT

## 2025-01-01 PROCEDURE — 99285 EMERGENCY DEPT VISIT HI MDM: CPT

## 2025-01-01 PROCEDURE — 82330 ASSAY OF CALCIUM: CPT

## 2025-01-01 PROCEDURE — 70450 CT HEAD/BRAIN W/O DYE: CPT

## 2025-01-01 PROCEDURE — 99497 ADVNCD CARE PLAN 30 MIN: CPT | Mod: GC,25

## 2025-01-01 PROCEDURE — 99223 1ST HOSP IP/OBS HIGH 75: CPT | Mod: GC

## 2025-01-01 PROCEDURE — 93308 TTE F-UP OR LMTD: CPT

## 2025-01-01 PROCEDURE — 83605 ASSAY OF LACTIC ACID: CPT

## 2025-01-01 PROCEDURE — 82435 ASSAY OF BLOOD CHLORIDE: CPT

## 2025-01-01 PROCEDURE — 93308 TTE F-UP OR LMTD: CPT | Mod: 26

## 2025-01-01 PROCEDURE — 84295 ASSAY OF SERUM SODIUM: CPT

## 2025-01-01 PROCEDURE — 94660 CPAP INITIATION&MGMT: CPT

## 2025-01-01 PROCEDURE — 99233 SBSQ HOSP IP/OBS HIGH 50: CPT | Mod: GC

## 2025-01-01 PROCEDURE — 99291 CRITICAL CARE FIRST HOUR: CPT

## 2025-01-01 PROCEDURE — 85730 THROMBOPLASTIN TIME PARTIAL: CPT

## 2025-01-01 PROCEDURE — 71275 CT ANGIOGRAPHY CHEST: CPT | Mod: 26

## 2025-01-01 PROCEDURE — 84132 ASSAY OF SERUM POTASSIUM: CPT

## 2025-01-01 PROCEDURE — 85014 HEMATOCRIT: CPT

## 2025-01-01 PROCEDURE — 85018 HEMOGLOBIN: CPT

## 2025-01-01 PROCEDURE — 93321 DOPPLER ECHO F-UP/LMTD STD: CPT

## 2025-01-01 PROCEDURE — C9254: CPT

## 2025-01-01 PROCEDURE — 93321 DOPPLER ECHO F-UP/LMTD STD: CPT | Mod: 26

## 2025-01-01 PROCEDURE — 70450 CT HEAD/BRAIN W/O DYE: CPT | Mod: 26

## 2025-01-01 PROCEDURE — 99053 MED SERV 10PM-8AM 24 HR FAC: CPT

## 2025-01-01 PROCEDURE — 85610 PROTHROMBIN TIME: CPT

## 2025-01-01 PROCEDURE — 93325 DOPPLER ECHO COLOR FLOW MAPG: CPT | Mod: 26

## 2025-01-01 PROCEDURE — 93010 ELECTROCARDIOGRAM REPORT: CPT | Mod: 76

## 2025-01-01 PROCEDURE — 93325 DOPPLER ECHO COLOR FLOW MAPG: CPT

## 2025-01-01 PROCEDURE — 71045 X-RAY EXAM CHEST 1 VIEW: CPT | Mod: 26

## 2025-01-01 RX ORDER — HYDROMORPHONE/SOD CHLOR,ISO/PF 2 MG/10 ML
0.3 SYRINGE (ML) INJECTION
Refills: 0 | Status: DISCONTINUED | OUTPATIENT
Start: 2025-01-01 | End: 2025-07-06

## 2025-01-01 RX ORDER — HEPARIN SODIUM 1000 [USP'U]/ML
INJECTION INTRAVENOUS; SUBCUTANEOUS
Qty: 25000 | Refills: 0 | Status: DISCONTINUED | OUTPATIENT
Start: 2025-01-01 | End: 2025-01-01

## 2025-01-01 RX ORDER — HEPARIN SODIUM 1000 [USP'U]/ML
4000 INJECTION INTRAVENOUS; SUBCUTANEOUS EVERY 6 HOURS
Refills: 0 | Status: DISCONTINUED | OUTPATIENT
Start: 2025-01-01 | End: 2025-01-01

## 2025-01-01 RX ORDER — HYDROMORPHONE/SOD CHLOR,ISO/PF 2 MG/10 ML
0.3 SYRINGE (ML) INJECTION
Refills: 0 | Status: DISCONTINUED | OUTPATIENT
Start: 2025-01-01 | End: 2025-01-01

## 2025-01-01 RX ORDER — HYDROMORPHONE/SOD CHLOR,ISO/PF 2 MG/10 ML
0.5 SYRINGE (ML) INJECTION
Refills: 0 | Status: DISCONTINUED | OUTPATIENT
Start: 2025-01-01 | End: 2025-07-06

## 2025-01-01 RX ORDER — LACOSAMIDE 150 MG/1
100 TABLET, FILM COATED ORAL EVERY 12 HOURS
Refills: 0 | Status: DISCONTINUED | OUTPATIENT
Start: 2025-01-01 | End: 2025-07-06

## 2025-01-01 RX ORDER — HEPARIN SODIUM 1000 [USP'U]/ML
9000 INJECTION INTRAVENOUS; SUBCUTANEOUS ONCE
Refills: 0 | Status: DISCONTINUED | OUTPATIENT
Start: 2025-01-01 | End: 2025-01-01

## 2025-01-01 RX ORDER — HEPARIN SODIUM 1000 [USP'U]/ML
8000 INJECTION INTRAVENOUS; SUBCUTANEOUS EVERY 6 HOURS
Refills: 0 | Status: DISCONTINUED | OUTPATIENT
Start: 2025-01-01 | End: 2025-01-01

## 2025-01-01 RX ORDER — HEPARIN SODIUM 1000 [USP'U]/ML
1600 INJECTION INTRAVENOUS; SUBCUTANEOUS
Qty: 25000 | Refills: 0 | Status: DISCONTINUED | OUTPATIENT
Start: 2025-01-01 | End: 2025-01-01

## 2025-01-01 RX ORDER — HEPARIN SODIUM 1000 [USP'U]/ML
9000 INJECTION INTRAVENOUS; SUBCUTANEOUS EVERY 6 HOURS
Refills: 0 | Status: DISCONTINUED | OUTPATIENT
Start: 2025-01-01 | End: 2025-01-01

## 2025-01-01 RX ORDER — GLYCOPYRROLATE 0.2 MG/ML
0.4 INJECTION INTRAMUSCULAR; INTRAVENOUS ONCE
Refills: 0 | Status: COMPLETED | OUTPATIENT
Start: 2025-01-01 | End: 2025-01-01

## 2025-01-01 RX ORDER — HYDROMORPHONE/SOD CHLOR,ISO/PF 2 MG/10 ML
0.5 SYRINGE (ML) INJECTION ONCE
Refills: 0 | Status: DISCONTINUED | OUTPATIENT
Start: 2025-01-01 | End: 2025-01-01

## 2025-01-01 RX ORDER — BISACODYL 5 MG
10 TABLET, DELAYED RELEASE (ENTERIC COATED) ORAL DAILY
Refills: 0 | Status: DISCONTINUED | OUTPATIENT
Start: 2025-01-01 | End: 2025-07-06

## 2025-01-01 RX ORDER — HEPARIN SODIUM 1000 [USP'U]/ML
1800 INJECTION INTRAVENOUS; SUBCUTANEOUS
Qty: 25000 | Refills: 0 | Status: DISCONTINUED | OUTPATIENT
Start: 2025-01-01 | End: 2025-01-01

## 2025-01-01 RX ORDER — GLYCOPYRROLATE 0.2 MG/ML
0.4 INJECTION INTRAMUSCULAR; INTRAVENOUS
Refills: 0 | Status: DISCONTINUED | OUTPATIENT
Start: 2025-01-01 | End: 2025-07-06

## 2025-01-01 RX ORDER — MIDAZOLAM IN 0.9 % SOD.CHLORID 1 MG/ML
1 PLASTIC BAG, INJECTION (ML) INTRAVENOUS ONCE
Refills: 0 | Status: DISCONTINUED | OUTPATIENT
Start: 2025-01-01 | End: 2025-01-01

## 2025-01-01 RX ORDER — HEPARIN SODIUM 1000 [USP'U]/ML
8000 INJECTION INTRAVENOUS; SUBCUTANEOUS ONCE
Refills: 0 | Status: COMPLETED | OUTPATIENT
Start: 2025-01-01 | End: 2025-01-01

## 2025-01-01 RX ORDER — IPRATROPIUM BROMIDE AND ALBUTEROL SULFATE .5; 2.5 MG/3ML; MG/3ML
3 SOLUTION RESPIRATORY (INHALATION) ONCE
Refills: 0 | Status: COMPLETED | OUTPATIENT
Start: 2025-01-01 | End: 2025-01-01

## 2025-01-01 RX ORDER — MIDAZOLAM IN 0.9 % SOD.CHLORID 1 MG/ML
1 PLASTIC BAG, INJECTION (ML) INTRAVENOUS
Refills: 0 | Status: DISCONTINUED | OUTPATIENT
Start: 2025-01-01 | End: 2025-07-06

## 2025-01-01 RX ADMIN — HEPARIN SODIUM 1600 UNIT(S)/HR: 1000 INJECTION INTRAVENOUS; SUBCUTANEOUS at 09:37

## 2025-01-01 RX ADMIN — Medication 0.5 MILLIGRAM(S): at 13:26

## 2025-01-01 RX ADMIN — Medication 0.5 MILLIGRAM(S): at 16:08

## 2025-01-01 RX ADMIN — Medication 1 MILLIGRAM(S): at 16:07

## 2025-01-01 RX ADMIN — Medication 0.3 MILLIGRAM(S): at 12:22

## 2025-01-01 RX ADMIN — Medication 1 MILLIGRAM(S): at 19:01

## 2025-01-01 RX ADMIN — GLYCOPYRROLATE 0.4 MILLIGRAM(S): 0.2 INJECTION INTRAMUSCULAR; INTRAVENOUS at 17:32

## 2025-01-01 RX ADMIN — Medication 1 MILLIGRAM(S): at 09:31

## 2025-01-01 RX ADMIN — IPRATROPIUM BROMIDE AND ALBUTEROL SULFATE 3 MILLILITER(S): .5; 2.5 SOLUTION RESPIRATORY (INHALATION) at 14:48

## 2025-01-01 RX ADMIN — Medication 0.5 MILLIGRAM(S): at 21:30

## 2025-01-01 RX ADMIN — LACOSAMIDE 120 MILLIGRAM(S): 150 TABLET, FILM COATED ORAL at 18:22

## 2025-01-01 RX ADMIN — Medication 0.3 MILLIGRAM(S): at 12:45

## 2025-01-01 RX ADMIN — HEPARIN SODIUM 8000 UNIT(S): 1000 INJECTION INTRAVENOUS; SUBCUTANEOUS at 21:53

## 2025-01-01 RX ADMIN — Medication 0.5 MILLIGRAM(S): at 09:31

## 2025-01-01 RX ADMIN — LACOSAMIDE 120 MILLIGRAM(S): 150 TABLET, FILM COATED ORAL at 06:00

## 2025-01-01 RX ADMIN — LACOSAMIDE 120 MILLIGRAM(S): 150 TABLET, FILM COATED ORAL at 17:53

## 2025-01-01 RX ADMIN — Medication 0.5 MILLIGRAM(S): at 17:31

## 2025-01-01 RX ADMIN — Medication 500 MILLILITER(S): at 14:48

## 2025-01-01 RX ADMIN — HEPARIN SODIUM 1800 UNIT(S)/HR: 1000 INJECTION INTRAVENOUS; SUBCUTANEOUS at 21:50

## 2025-01-01 RX ADMIN — Medication 0.5 MILLIGRAM(S): at 18:24

## 2025-01-01 RX ADMIN — Medication 1 MILLIGRAM(S): at 17:32

## 2025-01-01 RX ADMIN — Medication 0.3 MILLIGRAM(S): at 17:52

## 2025-05-10 NOTE — ED ADULT TRIAGE NOTE - ACCOMPANIED BY
Hany Devries, a 43 y.o. male presents to the ED with left side pain radiating to lower abd area. AAOX 4 Stated he had a few episodes of emesis pta. One episode while in ED. Stated symps started at 2000. Pt stated crawfish was the last meal he had. Pt not able to rest well. Constantly moving states he just has to move lying makes the pain worse, pt connected to BP cuff, and pulse oximeter. ED workup in progress. Pt wife at bedside. Call light within reach. Safety measures in place. Denies further needs. WCTM             EMT/paramedic

## 2025-05-15 ENCOUNTER — INPATIENT (INPATIENT)
Facility: HOSPITAL | Age: 85
LOS: 2 days | Discharge: HOME HEALTH | End: 2025-05-18
Attending: INTERNAL MEDICINE | Admitting: INTERNAL MEDICINE
Payer: MEDICARE

## 2025-05-15 VITALS
HEART RATE: 81 BPM | SYSTOLIC BLOOD PRESSURE: 163 MMHG | OXYGEN SATURATION: 95 % | TEMPERATURE: 98 F | HEIGHT: 64 IN | WEIGHT: 232.37 LBS | DIASTOLIC BLOOD PRESSURE: 87 MMHG | RESPIRATION RATE: 16 BRPM

## 2025-05-15 LAB
ALBUMIN SERPL ELPH-MCNC: 3.6 G/DL — SIGNIFICANT CHANGE UP (ref 3.3–5)
ALP SERPL-CCNC: 170 U/L — HIGH (ref 40–120)
ALT FLD-CCNC: 19 U/L — SIGNIFICANT CHANGE UP (ref 12–78)
ANION GAP SERPL CALC-SCNC: 10 MMOL/L — SIGNIFICANT CHANGE UP (ref 5–17)
APTT BLD: 31.6 SEC — SIGNIFICANT CHANGE UP (ref 26.1–36.8)
AST SERPL-CCNC: 19 U/L — SIGNIFICANT CHANGE UP (ref 15–37)
BASOPHILS # BLD AUTO: 0.03 K/UL — SIGNIFICANT CHANGE UP (ref 0–0.2)
BASOPHILS NFR BLD AUTO: 0.4 % — SIGNIFICANT CHANGE UP (ref 0–2)
BILIRUB SERPL-MCNC: 0.3 MG/DL — SIGNIFICANT CHANGE UP (ref 0.2–1.2)
BUN SERPL-MCNC: 16 MG/DL — SIGNIFICANT CHANGE UP (ref 7–23)
CALCIUM SERPL-MCNC: 9.6 MG/DL — SIGNIFICANT CHANGE UP (ref 8.5–10.1)
CHLORIDE SERPL-SCNC: 107 MMOL/L — SIGNIFICANT CHANGE UP (ref 96–108)
CO2 SERPL-SCNC: 23 MMOL/L — SIGNIFICANT CHANGE UP (ref 22–31)
CREAT SERPL-MCNC: 1.04 MG/DL — SIGNIFICANT CHANGE UP (ref 0.5–1.3)
EGFR: 53 ML/MIN/1.73M2 — LOW
EGFR: 53 ML/MIN/1.73M2 — LOW
EOSINOPHIL # BLD AUTO: 0.07 K/UL — SIGNIFICANT CHANGE UP (ref 0–0.5)
EOSINOPHIL NFR BLD AUTO: 0.8 % — SIGNIFICANT CHANGE UP (ref 0–6)
GLUCOSE SERPL-MCNC: 190 MG/DL — HIGH (ref 70–99)
HCT VFR BLD CALC: 45.1 % — HIGH (ref 34.5–45)
HGB BLD-MCNC: 14.4 G/DL — SIGNIFICANT CHANGE UP (ref 11.5–15.5)
IMM GRANULOCYTES NFR BLD AUTO: 0.5 % — SIGNIFICANT CHANGE UP (ref 0–0.9)
INR BLD: 1.17 RATIO — HIGH (ref 0.85–1.16)
LYMPHOCYTES # BLD AUTO: 1.29 K/UL — SIGNIFICANT CHANGE UP (ref 1–3.3)
LYMPHOCYTES # BLD AUTO: 15.5 % — SIGNIFICANT CHANGE UP (ref 13–44)
MCHC RBC-ENTMCNC: 28.6 PG — SIGNIFICANT CHANGE UP (ref 27–34)
MCHC RBC-ENTMCNC: 31.9 G/DL — LOW (ref 32–36)
MCV RBC AUTO: 89.7 FL — SIGNIFICANT CHANGE UP (ref 80–100)
MONOCYTES # BLD AUTO: 0.41 K/UL — SIGNIFICANT CHANGE UP (ref 0–0.9)
MONOCYTES NFR BLD AUTO: 4.9 % — SIGNIFICANT CHANGE UP (ref 2–14)
NEUTROPHILS # BLD AUTO: 6.49 K/UL — SIGNIFICANT CHANGE UP (ref 1.8–7.4)
NEUTROPHILS NFR BLD AUTO: 77.9 % — HIGH (ref 43–77)
NRBC BLD AUTO-RTO: 0 /100 WBCS — SIGNIFICANT CHANGE UP (ref 0–0)
PLATELET # BLD AUTO: 225 K/UL — SIGNIFICANT CHANGE UP (ref 150–400)
POTASSIUM SERPL-MCNC: 3.4 MMOL/L — LOW (ref 3.5–5.3)
POTASSIUM SERPL-SCNC: 3.4 MMOL/L — LOW (ref 3.5–5.3)
PROT SERPL-MCNC: 9 GM/DL — HIGH (ref 6–8.3)
PROTHROM AB SERPL-ACNC: 13.5 SEC — HIGH (ref 9.9–13.4)
RBC # BLD: 5.03 M/UL — SIGNIFICANT CHANGE UP (ref 3.8–5.2)
RBC # FLD: 14.6 % — HIGH (ref 10.3–14.5)
SODIUM SERPL-SCNC: 140 MMOL/L — SIGNIFICANT CHANGE UP (ref 135–145)
TROPONIN I, HIGH SENSITIVITY RESULT: 329.3 NG/L — HIGH
TROPONIN I, HIGH SENSITIVITY RESULT: 385.6 NG/L — HIGH
WBC # BLD: 8.33 K/UL — SIGNIFICANT CHANGE UP (ref 3.8–10.5)
WBC # FLD AUTO: 8.33 K/UL — SIGNIFICANT CHANGE UP (ref 3.8–10.5)

## 2025-05-15 PROCEDURE — 0042T: CPT

## 2025-05-15 PROCEDURE — 71045 X-RAY EXAM CHEST 1 VIEW: CPT | Mod: 26

## 2025-05-15 PROCEDURE — 70496 CT ANGIOGRAPHY HEAD: CPT | Mod: 26

## 2025-05-15 PROCEDURE — 70498 CT ANGIOGRAPHY NECK: CPT | Mod: 26

## 2025-05-15 PROCEDURE — 99285 EMERGENCY DEPT VISIT HI MDM: CPT

## 2025-05-15 PROCEDURE — 70450 CT HEAD/BRAIN W/O DYE: CPT | Mod: 26,XU

## 2025-05-15 RX ORDER — ONDANSETRON HCL/PF 4 MG/2 ML
4 VIAL (ML) INJECTION ONCE
Refills: 0 | Status: COMPLETED | OUTPATIENT
Start: 2025-05-15 | End: 2025-05-15

## 2025-05-15 RX ORDER — CEFTRIAXONE 500 MG/1
1000 INJECTION, POWDER, FOR SOLUTION INTRAMUSCULAR; INTRAVENOUS ONCE
Refills: 0 | Status: COMPLETED | OUTPATIENT
Start: 2025-05-15 | End: 2025-05-15

## 2025-05-15 RX ORDER — LABETALOL HYDROCHLORIDE 200 MG/1
10 TABLET, FILM COATED ORAL ONCE
Refills: 0 | Status: COMPLETED | OUTPATIENT
Start: 2025-05-15 | End: 2025-05-15

## 2025-05-15 RX ORDER — AZITHROMYCIN 250 MG
500 CAPSULE ORAL ONCE
Refills: 0 | Status: COMPLETED | OUTPATIENT
Start: 2025-05-15 | End: 2025-05-15

## 2025-05-15 RX ADMIN — LABETALOL HYDROCHLORIDE 10 MILLIGRAM(S): 200 TABLET, FILM COATED ORAL at 22:47

## 2025-05-15 RX ADMIN — CEFTRIAXONE 100 MILLIGRAM(S): 500 INJECTION, POWDER, FOR SOLUTION INTRAMUSCULAR; INTRAVENOUS at 22:48

## 2025-05-15 RX ADMIN — Medication 255 MILLIGRAM(S): at 22:55

## 2025-05-15 RX ADMIN — Medication 4 MILLIGRAM(S): at 22:46

## 2025-05-15 NOTE — ED PROVIDER NOTE - CLINICAL SUMMARY MEDICAL DECISION MAKING FREE TEXT BOX
When the patient arrived, she was made a stroke alert.  She is on Eliquis, she is not a TNK candidate.    CBC shows no leukocytosis.  No acute anemia.  No thrombocytopenia.  INR elevated 1.17.  Initial troponin at 329.3.  Delta troponin of 385.6.  EKG shows no acute ischemic changes.  No evidence of acute arrhythmias noted.   no acute electrolyte abnormalities.  Creatinine negative.  No elevated LFTs.  CT of the brain shows no acute intracranial abnormalities.  No evidence of LVO noted on CT angio of the head and neck.    Chest x-ray shows evidence of pneumonia.  On evaluation, it was noted that the patient is hypoxic to 90% on room air.  Requiring 2 L via nasal cannula.    Due to the hypoxia and possibility of a TIA, we believe that the patient would benefit from admission to the hospital for further workup and management.  Discussed this case with the hospitalist service who accepted admission for the patient.  Discussed this case with the patient's family who expressed understanding and were amenable for admission at this time.  Current plan is for admission to the hospital

## 2025-05-15 NOTE — ED ADULT TRIAGE NOTE - CHIEF COMPLAINT QUOTE
As per EMS states pt noted with weakness, nausea and vomiting by family x 1830. On Eliquis, history of previous stroke.

## 2025-05-15 NOTE — ED PROVIDER NOTE - NIH STROKE SCALE: 8. SENSORY, QM
Addended by: Lula Gooden on: 6/17/2022 01:28 PM     Modules accepted: Orders Resource Strain (CARDIA)     Difficulty of Paying Living Expenses: Not very hard   Food Insecurity: No Food Insecurity (10/12/2023)    Hunger Vital Sign     Worried About Running Out of Food in the Last Year: Never true     Ran Out of Food in the Last Year: Never true   Transportation Needs: Unknown (10/12/2023)    PRAPARE - Transportation     Lack of Transportation (Medical): Not on file     Lack of Transportation (Non-Medical): No   Physical Activity: Not on file   Stress: Not on file   Social Connections: Not on file   Intimate Partner Violence: Not on file   Housing Stability: Unknown (10/12/2023)    Housing Stability Vital Sign     Unable to Pay for Housing in the Last Year: Not on file     Number of Places Lived in the Last Year: Not on file     Unstable Housing in the Last Year: No       Family History   Problem Relation Age of Onset    Diabetes Father     HIV/AIDS Father     Diabetes Mother     HIV/AIDS Mother     Diabetes Brother     Diabetes Sister     Heart Disease Sister        Current Outpatient Medications   Medication Sig Dispense Refill    albuterol sulfate HFA (VENTOLIN HFA) 108 (90 Base) MCG/ACT inhaler Inhale 2 puffs into the lungs 4 times daily as needed for Wheezing 18 g 0    atorvastatin (LIPITOR) 80 MG tablet Take 1 tablet by mouth daily      bictegravir-emtricitab-tenofovir alafenamide (BIKTARVY) -25 MG TABS per tablet Take 1 tablet by mouth daily      glyBURIDE (DIABETA) 5 MG tablet Take 1 tablet by mouth 4 times daily      lamoTRIgine (LAMICTAL) 25 MG tablet Take 1 tablet by mouth 2 times daily      lisinopril (PRINIVIL;ZESTRIL) 2.5 MG tablet Take 1 tablet by mouth daily      Cholecalciferol (VITAMIN D3) 125 MCG (5000 UT) TABS Take 1 tablet by mouth daily      OLANZapine (ZYPREXA) 2.5 MG tablet Take 1 tablet by mouth nightly      prazosin (MINIPRESS) 2 MG capsule Take 1 capsule by mouth nightly       No current facility-administered medications for this visit.        Allergies (0) Normal; no sensory loss

## 2025-05-15 NOTE — ED PROVIDER NOTE - OBJECTIVE STATEMENT
This patient is an 84-year-old female presenting to the emergency department today as a stroke alert.  She has a past medical history of prior CVA.  She has a history of hypertension, hyperlipidemia.  She is nonverbal at baseline.  Only responds with yes and no.  She is on Eliquis per family and per EMS.    Last known well at 1830.  They state that at that time patient had episodes of altered mental status.  Typically she is able to respond to yes and no to family, however she was not responding appropriately.  She had increased weakness normal right upper and lower extremity.  She also had weakness of the left upper and lower extremity.  She has a history of weakness in the right upper and lower extremity from her prior CVA.  When the patient arrived to the emergency department, her symptoms had improved to the upper extremities, though she still has bilateral lower extremity weakness.  Mild headache at this time.  Therefore the history of present illness can be obtained as the patient is nonverbal at baseline.  Acute care caveat will take place.

## 2025-05-15 NOTE — ED ADULT NURSE NOTE - NSFALLRISKINTERV_ED_ALL_ED

## 2025-05-15 NOTE — ED PROVIDER NOTE - PHYSICAL EXAMINATION
GENERAL: The patient appears well and is in no apparent distress.      EYES: Pupils equal and reactive.  Extraocular eye movements are intact.    ENT: Head is atraumatic.  Posterior oropharynx is unremarkable.      RESPIRATORY: Lungs are clear to auscultation bilaterally.  The patient has no significant wheezing, rhonchi, or rales.    CARDIOVASCULAR: The patient has a regular rate and rhythm with no significant murmurs, rubs, or gallops.    ABDOMEN: Abdomen is soft, nondistended, and non-peritoneal.  The patient has no focal areas of tenderness.    SKIN: Skin is intact without evidence of significant lacerations or sores.    MUSCULOSKELETAL: Patient has good range of motion of all extremities.  The patient has good distal cap refill.  The patient has palpable distal pulses.  No obvious edema is noted.    NEUROLOGIC: Cranial nerves II through XII are grossly within normal limits.  Weakness in her bilateral lower extremities

## 2025-05-16 LAB
A1C WITH ESTIMATED AVERAGE GLUCOSE RESULT: 6.1 % — HIGH (ref 4–5.6)
ALBUMIN SERPL ELPH-MCNC: 3.3 G/DL — SIGNIFICANT CHANGE UP (ref 3.3–5)
ALP SERPL-CCNC: 159 U/L — HIGH (ref 40–120)
ALT FLD-CCNC: 23 U/L — SIGNIFICANT CHANGE UP (ref 12–78)
ANION GAP SERPL CALC-SCNC: 8 MMOL/L — SIGNIFICANT CHANGE UP (ref 5–17)
APPEARANCE UR: CLEAR — SIGNIFICANT CHANGE UP
AST SERPL-CCNC: 30 U/L — SIGNIFICANT CHANGE UP (ref 15–37)
BASOPHILS # BLD AUTO: 0.01 K/UL — SIGNIFICANT CHANGE UP (ref 0–0.2)
BASOPHILS NFR BLD AUTO: 0.1 % — SIGNIFICANT CHANGE UP (ref 0–2)
BILIRUB SERPL-MCNC: 0.3 MG/DL — SIGNIFICANT CHANGE UP (ref 0.2–1.2)
BILIRUB UR-MCNC: NEGATIVE — SIGNIFICANT CHANGE UP
BUN SERPL-MCNC: 14 MG/DL — SIGNIFICANT CHANGE UP (ref 7–23)
CALCIUM SERPL-MCNC: 9.4 MG/DL — SIGNIFICANT CHANGE UP (ref 8.5–10.1)
CHLORIDE SERPL-SCNC: 106 MMOL/L — SIGNIFICANT CHANGE UP (ref 96–108)
CO2 SERPL-SCNC: 23 MMOL/L — SIGNIFICANT CHANGE UP (ref 22–31)
COLOR SPEC: YELLOW — SIGNIFICANT CHANGE UP
CREAT SERPL-MCNC: 1 MG/DL — SIGNIFICANT CHANGE UP (ref 0.5–1.3)
DIFF PNL FLD: ABNORMAL
EGFR: 56 ML/MIN/1.73M2 — LOW
EGFR: 56 ML/MIN/1.73M2 — LOW
EOSINOPHIL # BLD AUTO: 0.01 K/UL — SIGNIFICANT CHANGE UP (ref 0–0.5)
EOSINOPHIL NFR BLD AUTO: 0.1 % — SIGNIFICANT CHANGE UP (ref 0–6)
ESTIMATED AVERAGE GLUCOSE: 128 MG/DL — HIGH (ref 68–114)
GLUCOSE BLDC GLUCOMTR-MCNC: 104 MG/DL — HIGH (ref 70–99)
GLUCOSE BLDC GLUCOMTR-MCNC: 114 MG/DL — HIGH (ref 70–99)
GLUCOSE BLDC GLUCOMTR-MCNC: 122 MG/DL — HIGH (ref 70–99)
GLUCOSE BLDC GLUCOMTR-MCNC: 130 MG/DL — HIGH (ref 70–99)
GLUCOSE SERPL-MCNC: 167 MG/DL — HIGH (ref 70–99)
GLUCOSE UR QL: NEGATIVE MG/DL — SIGNIFICANT CHANGE UP
HCT VFR BLD CALC: 43.6 % — SIGNIFICANT CHANGE UP (ref 34.5–45)
HGB BLD-MCNC: 14.1 G/DL — SIGNIFICANT CHANGE UP (ref 11.5–15.5)
IMM GRANULOCYTES NFR BLD AUTO: 0.6 % — SIGNIFICANT CHANGE UP (ref 0–0.9)
KETONES UR QL: NEGATIVE MG/DL — SIGNIFICANT CHANGE UP
LEUKOCYTE ESTERASE UR-ACNC: ABNORMAL
LYMPHOCYTES # BLD AUTO: 1.33 K/UL — SIGNIFICANT CHANGE UP (ref 1–3.3)
LYMPHOCYTES # BLD AUTO: 13.9 % — SIGNIFICANT CHANGE UP (ref 13–44)
MCHC RBC-ENTMCNC: 29 PG — SIGNIFICANT CHANGE UP (ref 27–34)
MCHC RBC-ENTMCNC: 32.3 G/DL — SIGNIFICANT CHANGE UP (ref 32–36)
MCV RBC AUTO: 89.7 FL — SIGNIFICANT CHANGE UP (ref 80–100)
MONOCYTES # BLD AUTO: 0.51 K/UL — SIGNIFICANT CHANGE UP (ref 0–0.9)
MONOCYTES NFR BLD AUTO: 5.3 % — SIGNIFICANT CHANGE UP (ref 2–14)
NEUTROPHILS # BLD AUTO: 7.64 K/UL — HIGH (ref 1.8–7.4)
NEUTROPHILS NFR BLD AUTO: 80 % — HIGH (ref 43–77)
NITRITE UR-MCNC: NEGATIVE — SIGNIFICANT CHANGE UP
NRBC BLD AUTO-RTO: 0 /100 WBCS — SIGNIFICANT CHANGE UP (ref 0–0)
PH UR: 7.5 — SIGNIFICANT CHANGE UP (ref 5–8)
PLATELET # BLD AUTO: 200 K/UL — SIGNIFICANT CHANGE UP (ref 150–400)
POTASSIUM SERPL-MCNC: 4.3 MMOL/L — SIGNIFICANT CHANGE UP (ref 3.5–5.3)
POTASSIUM SERPL-SCNC: 4.3 MMOL/L — SIGNIFICANT CHANGE UP (ref 3.5–5.3)
PROT SERPL-MCNC: 8.4 GM/DL — HIGH (ref 6–8.3)
PROT UR-MCNC: SIGNIFICANT CHANGE UP MG/DL
RBC # BLD: 4.86 M/UL — SIGNIFICANT CHANGE UP (ref 3.8–5.2)
RBC # FLD: 14.6 % — HIGH (ref 10.3–14.5)
SODIUM SERPL-SCNC: 137 MMOL/L — SIGNIFICANT CHANGE UP (ref 135–145)
SP GR SPEC: 1.03 — SIGNIFICANT CHANGE UP (ref 1–1.03)
TROPONIN I, HIGH SENSITIVITY RESULT: 268.7 NG/L — HIGH
TROPONIN I, HIGH SENSITIVITY RESULT: 335.3 NG/L — HIGH
UROBILINOGEN FLD QL: 0.2 MG/DL — SIGNIFICANT CHANGE UP (ref 0.2–1)
WBC # BLD: 9.56 K/UL — SIGNIFICANT CHANGE UP (ref 3.8–10.5)
WBC # FLD AUTO: 9.56 K/UL — SIGNIFICANT CHANGE UP (ref 3.8–10.5)

## 2025-05-16 PROCEDURE — 99223 1ST HOSP IP/OBS HIGH 75: CPT

## 2025-05-16 PROCEDURE — 74018 RADEX ABDOMEN 1 VIEW: CPT | Mod: 26

## 2025-05-16 PROCEDURE — 93010 ELECTROCARDIOGRAM REPORT: CPT

## 2025-05-16 PROCEDURE — 99222 1ST HOSP IP/OBS MODERATE 55: CPT

## 2025-05-16 PROCEDURE — 99233 SBSQ HOSP IP/OBS HIGH 50: CPT

## 2025-05-16 RX ORDER — APIXABAN 2.5 MG/1
5 TABLET, FILM COATED ORAL
Refills: 0 | Status: DISCONTINUED | OUTPATIENT
Start: 2025-05-16 | End: 2025-05-18

## 2025-05-16 RX ORDER — DEXTROSE 50 % IN WATER 50 %
25 SYRINGE (ML) INTRAVENOUS ONCE
Refills: 0 | Status: DISCONTINUED | OUTPATIENT
Start: 2025-05-16 | End: 2025-05-18

## 2025-05-16 RX ORDER — POLYETHYLENE GLYCOL 3350 17 G/17G
17 POWDER, FOR SOLUTION ORAL
Refills: 0 | Status: DISCONTINUED | OUTPATIENT
Start: 2025-05-16 | End: 2025-05-18

## 2025-05-16 RX ORDER — ENALAPRIL MALEATE 20 MG
10 TABLET ORAL DAILY
Refills: 0 | Status: DISCONTINUED | OUTPATIENT
Start: 2025-05-16 | End: 2025-05-16

## 2025-05-16 RX ORDER — ATORVASTATIN CALCIUM 80 MG/1
40 TABLET, FILM COATED ORAL AT BEDTIME
Refills: 0 | Status: DISCONTINUED | OUTPATIENT
Start: 2025-05-16 | End: 2025-05-18

## 2025-05-16 RX ORDER — INSULIN LISPRO 100 U/ML
INJECTION, SOLUTION INTRAVENOUS; SUBCUTANEOUS
Refills: 0 | Status: DISCONTINUED | OUTPATIENT
Start: 2025-05-16 | End: 2025-05-18

## 2025-05-16 RX ORDER — TORSEMIDE 10 MG
0 TABLET ORAL
Qty: 0 | Refills: 1 | DISCHARGE

## 2025-05-16 RX ORDER — DEXTROSE 50 % IN WATER 50 %
15 SYRINGE (ML) INTRAVENOUS ONCE
Refills: 0 | Status: DISCONTINUED | OUTPATIENT
Start: 2025-05-16 | End: 2025-05-18

## 2025-05-16 RX ORDER — METOPROLOL SUCCINATE 50 MG/1
50 TABLET, EXTENDED RELEASE ORAL
Refills: 0 | Status: DISCONTINUED | OUTPATIENT
Start: 2025-05-16 | End: 2025-05-16

## 2025-05-16 RX ORDER — NIFEDIPINE 30 MG
30 TABLET, EXTENDED RELEASE 24 HR ORAL DAILY
Refills: 0 | Status: DISCONTINUED | OUTPATIENT
Start: 2025-05-16 | End: 2025-05-16

## 2025-05-16 RX ORDER — LACOSAMIDE 150 MG/1
100 TABLET, FILM COATED ORAL
Refills: 0 | Status: DISCONTINUED | OUTPATIENT
Start: 2025-05-16 | End: 2025-05-18

## 2025-05-16 RX ORDER — MAGNESIUM, ALUMINUM HYDROXIDE 200-200 MG
30 TABLET,CHEWABLE ORAL EVERY 4 HOURS
Refills: 0 | Status: DISCONTINUED | OUTPATIENT
Start: 2025-05-16 | End: 2025-05-18

## 2025-05-16 RX ORDER — MONTELUKAST SODIUM 10 MG/1
10 TABLET ORAL DAILY
Refills: 0 | Status: DISCONTINUED | OUTPATIENT
Start: 2025-05-16 | End: 2025-05-18

## 2025-05-16 RX ORDER — ACETAMINOPHEN 500 MG/5ML
650 LIQUID (ML) ORAL EVERY 6 HOURS
Refills: 0 | Status: DISCONTINUED | OUTPATIENT
Start: 2025-05-16 | End: 2025-05-18

## 2025-05-16 RX ORDER — SODIUM CHLORIDE 9 G/1000ML
1000 INJECTION, SOLUTION INTRAVENOUS
Refills: 0 | Status: DISCONTINUED | OUTPATIENT
Start: 2025-05-16 | End: 2025-05-18

## 2025-05-16 RX ORDER — CEFTRIAXONE 500 MG/1
1000 INJECTION, POWDER, FOR SOLUTION INTRAMUSCULAR; INTRAVENOUS EVERY 24 HOURS
Refills: 0 | Status: DISCONTINUED | OUTPATIENT
Start: 2025-05-16 | End: 2025-05-18

## 2025-05-16 RX ORDER — ASPIRIN 325 MG
81 TABLET ORAL DAILY
Refills: 0 | Status: DISCONTINUED | OUTPATIENT
Start: 2025-05-16 | End: 2025-05-16

## 2025-05-16 RX ORDER — APIXABAN 2.5 MG/1
5 TABLET, FILM COATED ORAL
Refills: 0 | Status: DISCONTINUED | OUTPATIENT
Start: 2025-05-16 | End: 2025-05-16

## 2025-05-16 RX ORDER — METOPROLOL SUCCINATE 50 MG/1
25 TABLET, EXTENDED RELEASE ORAL DAILY
Refills: 0 | Status: DISCONTINUED | OUTPATIENT
Start: 2025-05-16 | End: 2025-05-18

## 2025-05-16 RX ORDER — ASPIRIN 325 MG
300 TABLET ORAL DAILY
Refills: 0 | Status: DISCONTINUED | OUTPATIENT
Start: 2025-05-16 | End: 2025-05-16

## 2025-05-16 RX ORDER — DEXTROSE 50 % IN WATER 50 %
12.5 SYRINGE (ML) INTRAVENOUS ONCE
Refills: 0 | Status: DISCONTINUED | OUTPATIENT
Start: 2025-05-16 | End: 2025-05-18

## 2025-05-16 RX ORDER — AZITHROMYCIN 250 MG
500 CAPSULE ORAL EVERY 24 HOURS
Refills: 0 | Status: DISCONTINUED | OUTPATIENT
Start: 2025-05-16 | End: 2025-05-18

## 2025-05-16 RX ORDER — KETOROLAC TROMETHAMINE 30 MG/ML
30 INJECTION, SOLUTION INTRAMUSCULAR; INTRAVENOUS ONCE
Refills: 0 | Status: DISCONTINUED | OUTPATIENT
Start: 2025-05-16 | End: 2025-05-16

## 2025-05-16 RX ORDER — ONDANSETRON HCL/PF 4 MG/2 ML
4 VIAL (ML) INJECTION EVERY 8 HOURS
Refills: 0 | Status: DISCONTINUED | OUTPATIENT
Start: 2025-05-16 | End: 2025-05-18

## 2025-05-16 RX ORDER — MELATONIN 5 MG
3 TABLET ORAL AT BEDTIME
Refills: 0 | Status: DISCONTINUED | OUTPATIENT
Start: 2025-05-16 | End: 2025-05-18

## 2025-05-16 RX ORDER — GLUCAGON 3 MG/1
1 POWDER NASAL ONCE
Refills: 0 | Status: DISCONTINUED | OUTPATIENT
Start: 2025-05-16 | End: 2025-05-18

## 2025-05-16 RX ORDER — SENNA 187 MG
2 TABLET ORAL DAILY
Refills: 0 | Status: DISCONTINUED | OUTPATIENT
Start: 2025-05-16 | End: 2025-05-18

## 2025-05-16 RX ADMIN — Medication 75 MILLILITER(S): at 04:48

## 2025-05-16 RX ADMIN — Medication 2 TABLET(S): at 11:58

## 2025-05-16 RX ADMIN — METOPROLOL SUCCINATE 25 MILLIGRAM(S): 50 TABLET, EXTENDED RELEASE ORAL at 18:49

## 2025-05-16 RX ADMIN — KETOROLAC TROMETHAMINE 30 MILLIGRAM(S): 30 INJECTION, SOLUTION INTRAMUSCULAR; INTRAVENOUS at 11:58

## 2025-05-16 RX ADMIN — Medication 81 MILLIGRAM(S): at 18:49

## 2025-05-16 RX ADMIN — LACOSAMIDE 100 MILLIGRAM(S): 150 TABLET, FILM COATED ORAL at 18:49

## 2025-05-16 RX ADMIN — CEFTRIAXONE 100 MILLIGRAM(S): 500 INJECTION, POWDER, FOR SOLUTION INTRAMUSCULAR; INTRAVENOUS at 21:29

## 2025-05-16 RX ADMIN — Medication 255 MILLIGRAM(S): at 22:21

## 2025-05-16 RX ADMIN — ATORVASTATIN CALCIUM 40 MILLIGRAM(S): 80 TABLET, FILM COATED ORAL at 21:29

## 2025-05-16 RX ADMIN — MONTELUKAST SODIUM 10 MILLIGRAM(S): 10 TABLET ORAL at 11:58

## 2025-05-16 RX ADMIN — POLYETHYLENE GLYCOL 3350 17 GRAM(S): 17 POWDER, FOR SOLUTION ORAL at 11:57

## 2025-05-16 RX ADMIN — APIXABAN 5 MILLIGRAM(S): 2.5 TABLET, FILM COATED ORAL at 21:29

## 2025-05-16 RX ADMIN — APIXABAN 5 MILLIGRAM(S): 2.5 TABLET, FILM COATED ORAL at 11:58

## 2025-05-16 RX ADMIN — Medication 4 GRAM(S): at 11:57

## 2025-05-16 RX ADMIN — POLYETHYLENE GLYCOL 3350 17 GRAM(S): 17 POWDER, FOR SOLUTION ORAL at 18:49

## 2025-05-16 NOTE — H&P ADULT - NSHPLABSRESULTS_GEN_ALL_CORE
.  LABS:                         14.4   8.33  )-----------( 225      ( 15 May 2025 21:06 )             45.1     05-16    137  |  106  |  14  ----------------------------<  167[H]  4.3   |  23  |  1.00    Ca    9.4      16 May 2025 03:14    TPro  8.4[H]  /  Alb  3.3  /  TBili  0.3  /  DBili  x   /  AST  30  /  ALT  23  /  AlkPhos  159[H]  05-16    PT/INR - ( 15 May 2025 21:06 )   PT: 13.5 sec;   INR: 1.17 ratio         PTT - ( 15 May 2025 21:06 )  PTT:31.6 sec  Urinalysis Basic - ( 16 May 2025 03:14 )    Color: x / Appearance: x / SG: x / pH: x  Gluc: 167 mg/dL / Ketone: x  / Bili: x / Urobili: x   Blood: x / Protein: x / Nitrite: x   Leuk Esterase: x / RBC: x / WBC x   Sq Epi: x / Non Sq Epi: x / Bacteria: x            RADIOLOGY, EKG & ADDITIONAL TESTS: Reviewed.

## 2025-05-16 NOTE — SWALLOW BEDSIDE ASSESSMENT ADULT - H & P REVIEW
"84-year-old female with PMH CVA with right-sided deficits, HTN, HLD, nonverbal at baseline, who presents today for altered mental status and new-onset weakness. As per family, patient has been exhibiting altered mental status since 630 tonight. Patient is usually able to respond yes and no to family however today she has not been able to do this. Family states that patient had increasing weakness in all extremities (although right side is weak at baseline). Weakness of upper extremities had returned to baseline on arrival to the ED, although lower extremity weakness persisted."/yes

## 2025-05-16 NOTE — H&P ADULT - NSHPPHYSICALEXAM_GEN_ALL_CORE
Vital Signs Last 24 Hrs  T(C): 36.2 (16 May 2025 04:20), Max: 36.6 (15 May 2025 21:51)  T(F): 97.2 (16 May 2025 04:20), Max: 97.9 (15 May 2025 21:51)  HR: 81 (16 May 2025 04:20) (81 - 86)  BP: 137/85 (16 May 2025 04:20) (118/93 - 177/97)  BP(mean): --  RR: 18 (16 May 2025 04:20) (16 - 22)  SpO2: 97% (16 May 2025 04:20) (95% - 97%)    Parameters below as of 16 May 2025 04:20  Patient On (Oxygen Delivery Method): nasal cannula  O2 Flow (L/min): 2    GENERAL: NAD, lying in bed comfortably  HEAD:  Atraumatic, normocephalic  EYES: EOMI, PERRL  NECK: Supple, trachea midline, no JVD  HEART: Regular rate and rhythm  LUNGS: Unlabored respirations.  Clear to auscultation bilaterally, no crackles, wheezing, or rhonchi  ABDOMEN: Soft, nontender, nondistended, +BS  EXTREMITIES: 2+ peripheral pulses bilaterally. No clubbing, cyanosis, or edema  NERVOUS SYSTEM:  pt not cooperating with full neuro exam

## 2025-05-16 NOTE — SWALLOW BEDSIDE ASSESSMENT ADULT - MODE OF PRESENTATION
pt with difficulty drawing liquid into straw- decreased coordination/ ?groping/cup/spoon/straw/fed by clinician spoon/fed by clinician

## 2025-05-16 NOTE — SWALLOW BEDSIDE ASSESSMENT ADULT - ADDITIONAL RECOMMENDATIONS
Short term Goals pt will tolerate recommended textures without s/sx of aspiration.   pt/ family education regarding oral care /safe swallowing guidelines and will demonstrate understanding and carryover.

## 2025-05-16 NOTE — OCCUPATIONAL THERAPY INITIAL EVALUATION ADULT - IMPAIRED TRANSFERS: BED/CHAIR, REHAB EVAL
ataxic/impaired balance/cognition/impaired coordination/impaired motor control/abnormal muscle tone/impaired postural control/decreased ROM/decreased strength

## 2025-05-16 NOTE — H&P ADULT - HISTORY OF PRESENT ILLNESS
84-year-old female with PMH CVA with right-sided deficits, HTN, HLD, nonverbal at baseline, who presents today for altered mental status and new-onset weakness. As per family, patient has been exhibiting altered mental status since 630 tonight. Patient is usually able to respond yes and no to family however today she has not been able to do this. Family states that patient had increasing weakness in all extremities (although right side is weak at baseline). Weakness of upper extremities had returned to baseline on arrival to the ED, although lower extremity weakness persisted.

## 2025-05-16 NOTE — SWALLOW BEDSIDE ASSESSMENT ADULT - SWALLOW EVAL: DIAGNOSIS
pt presented with mild to moderate oropharyngeal dysphagia for puree, mild thick liquid and thin liquid. oral phase marked by fair labial seal, reduced stripping utensil, adequate oral containment, slowed bolus manipulation and decreased anterior posterior transport for puree resulting in mild to moderate right sulcus/lingual residue- pt able to clear with cuing, and adequate oral clearance. suspect delay in initiation of pharyngeal swallow trigger and decreased laryngeal excursion to palpation without overt signs of impaired airway protection.

## 2025-05-16 NOTE — CONSULT NOTE ADULT - SUBJECTIVE AND OBJECTIVE BOX
BIBEMS from home late yesterday.  History from Admission H&P    <Start of quote(s) from H&P>  "Reason for Admission: TIA, pneumonia  History of Present Illness:   84-year-old female with PMH CVA with right-sided deficits, HTN, HLD, nonverbal at baseline, who presents today for altered mental status and new-onset weakness. As per family, patient has been exhibiting altered mental status since 630 tonight. Patient is usually able to respond yes and no to family however today she has not been able to do this. Family states that patient had increasing weakness in all extremities (although right side is weak at baseline). Weakness of upper extremities had returned to baseline on arrival to the ED, although lower extremity weakness persisted.      Review of Systems:  Review of Systems: nonverbal   . . .     Home Medications:   * Patient Currently Takes Medications as of 03-Aug-2022 18:36 documented in Structured Notes  · 	metoprolol tartrate 50 mg oral tablet: 1 tab(s) orally 2 times a day  · 	apixaban 5 mg oral tablet: 1 tab(s) orally 2 times a day  · 	enalapril 20 mg oral tablet: 1 tab(s) orally once a day  · 	aspirin 81 mg oral delayed release tablet: 1 tab(s) orally once a day  · 	hydroCHLOROthiazide 25 mg oral tablet: 1 tab(s) orally once a day (in the morning)  · 	famotidine 40 mg oral tablet: 1 tab(s) orally once a day (at bedtime)  · 	ELIQUIS 5MG TABLETS: TAKE 1 TABLET BY MOUTH TWICE DAILY  · 	ATORVASTATIN 40MG TABLETS: TAKE 1 TABLET BY MOUTH EVERY NIGHT AT BEDTIME  · 	CHOLESTYRAMINE 4 G POWD PACK:   · 	FARXIGA 10 MG TABLET:   · 	ENALAPRIL 10MG TABLETS: TAKE 1 TABLET BY MOUTH TWICE DAILY  · 	HYDROCORTISONE 2.5 % OINT. (G):   · 	LACOSAMIDE 100 MG TABLET:   · 	METOPROLOL TARTRATE 50 MG TABLET:   · 	MONTELUKAST SODIUM 10 MG TABLET:   · 	NIFEDIPINE ER 30 MG TAB ER 24:   · 	TORSEMIDE 5MG TABLETS: TAKE 1 TABLET BY MOUTH 3 TIMES A WEEK  · 	TRAMADOL HCL 50 MG TABLET:    . . .       PAST MEDICAL HISTORY:  CVA (cerebrovascular accident)   High cholesterol   HTN (hypertension)   Seizure disorder.    . . .     · Substance use	No  . . .     #Community-acquired pneumonia  CXR shows evidence of pneumonia  - Continue ceftriaxone & azithromycin  - Urinary strep & legionella ordered    #Elevated troponin   Patient presents with elevated troponin 329.3 --> 385.6 --> 335.3. EKG shows no ST changes or signs of ischemia. Likely 2/2 demand ischemia    Chronic medical conditions:  Atrial fibrillation: Continue eliquis. Holding metoprolol in the setting of permissive HTN"  <End of quote(s) from H&P>       BIBEMS from home late yesterday.  History from Admission H&P this morning.    <Start of quote(s) from H&P>  "Reason for Admission: TIA, pneumonia  History of Present Illness:   84-year-old female with PMH CVA with right-sided deficits, HTN, HLD, nonverbal at baseline, who presents today for altered mental status and new-onset weakness. As per family, patient has been exhibiting altered mental status since 630 tonight. Patient is usually able to respond yes and no to family however today she has not been able to do this. Family states that patient had increasing weakness in all extremities (although right side is weak at baseline). Weakness of upper extremities had returned to baseline on arrival to the ED, although lower extremity weakness persisted.      Review of Systems:  Review of Systems: nonverbal   . . .     Home Medications:   * Patient Currently Takes Medications as of 03-Aug-2022 18:36 documented in Structured Notes  · 	metoprolol tartrate 50 mg oral tablet: 1 tab(s) orally 2 times a day  · 	apixaban 5 mg oral tablet: 1 tab(s) orally 2 times a day  · 	enalapril 20 mg oral tablet: 1 tab(s) orally once a day  · 	aspirin 81 mg oral delayed release tablet: 1 tab(s) orally once a day  · 	hydroCHLOROthiazide 25 mg oral tablet: 1 tab(s) orally once a day (in the morning)  · 	famotidine 40 mg oral tablet: 1 tab(s) orally once a day (at bedtime)  · 	ELIQUIS 5MG TABLETS: TAKE 1 TABLET BY MOUTH TWICE DAILY  · 	ATORVASTATIN 40MG TABLETS: TAKE 1 TABLET BY MOUTH EVERY NIGHT AT BEDTIME  · 	CHOLESTYRAMINE 4 G POWD PACK:   · 	FARXIGA 10 MG TABLET:   · 	ENALAPRIL 10MG TABLETS: TAKE 1 TABLET BY MOUTH TWICE DAILY  · 	HYDROCORTISONE 2.5 % OINT. (G):   · 	LACOSAMIDE 100 MG TABLET:   · 	METOPROLOL TARTRATE 50 MG TABLET:   · 	MONTELUKAST SODIUM 10 MG TABLET:   · 	NIFEDIPINE ER 30 MG TAB ER 24:   · 	TORSEMIDE 5MG TABLETS: TAKE 1 TABLET BY MOUTH 3 TIMES A WEEK  · 	TRAMADOL HCL 50 MG TABLET:    . . .       PAST MEDICAL HISTORY:  CVA (cerebrovascular accident)   High cholesterol   HTN (hypertension)   Seizure disorder.    . . .     · Substance use	No  . . .     #Community-acquired pneumonia  CXR shows evidence of pneumonia  - Continue ceftriaxone & azithromycin  - Urinary strep & legionella ordered    #Elevated troponin   Patient presents with elevated troponin 329.3 --> 385.6 --> 335.3. EKG shows no ST changes or signs of ischemia. Likely 2/2 demand ischemia    Chronic medical conditions:  Atrial fibrillation: Continue eliquis. Holding metoprolol in the setting of permissive HTN"  <End of quote(s) from H&P>    FROM REVIEW OF EMR:    Acute R parietal infarct June 2019.  Reportedly had normal speech at the time.    "Massive stroke" in mid 2022 while on a trip to [place].  Has been non-verbal since.      Per radiology report of CT head yesterday:  "COMPARISON: CT head 8/3/2022    CONTRAST:  IV Contrast: None      TECHNIQUE:  Serial axial images were obtained from the skull base to the   vertex using multi-slice helical technique. Sagittal and coronal   reformats were obtained.    FINDINGS:    VENTRICLES AND SULCI: Age appropriate involutional changes.  INTRA-AXIAL: No mass effect, acute hemorrhage, or midline shift.  There   are periventricular and subcortical white matter hypodensities,   consistent with microvascular type changes. Chronic left frontal, left   insular, and right parietal infarcts. Left-sided wallerian degeneration.  EXTRA-AXIAL: No mass or fluid collection. Basal cisterns are normal in   appearance.    VISUALIZED SINUSES:  Clear.  TYMPANOMASTOID CAVITIES:  Clear.  VISUALIZED ORBITS: Bilateral lens replacement.  CALVARIUM: Intact.    MISCELLANEOUS: Left MCA stent.      IMPRESSION:  No acute intracranial hemorrhage, mass effect, or midline shift."    Per radiology report of CTP brain and CTAs head and neck:  "IMPRESSION:    CT PERFUSION:  Technical limitations: None.    Core infarction: 5 mL, corresponding to chronic right parietal infarct.  Penumbra / tissue at risk for active ischemia: 15 mL, large corresponding   to chronic infarct in the right parietal lobe.    CTA NECK:  No evidence of significant stenosis or occlusion.    CTA HEAD:  No large vessel occlusion. Limited evaluation of the left MCA at the   proximal and distal ends of the stent."    x   BIBEMS from home late yesterday.  History from Admission H&P this morning.    <Start of quote(s) from H&P>  "Reason for Admission: TIA, pneumonia  History of Present Illness:   84-year-old female with PMH CVA with right-sided deficits, HTN, HLD, nonverbal at baseline, who presents today for altered mental status and new-onset weakness. As per family, patient has been exhibiting altered mental status since 630 tonight. Patient is usually able to respond yes and no to family however today she has not been able to do this. Family states that patient had increasing weakness in all extremities (although right side is weak at baseline). Weakness of upper extremities had returned to baseline on arrival to the ED, although lower extremity weakness persisted.      Review of Systems:  Review of Systems: nonverbal   . . .     Home Medications:   * Patient Currently Takes Medications as of 03-Aug-2022 18:36 documented in Structured Notes  · 	metoprolol tartrate 50 mg oral tablet: 1 tab(s) orally 2 times a day  · 	apixaban 5 mg oral tablet: 1 tab(s) orally 2 times a day  · 	enalapril 20 mg oral tablet: 1 tab(s) orally once a day  · 	aspirin 81 mg oral delayed release tablet: 1 tab(s) orally once a day  · 	hydroCHLOROthiazide 25 mg oral tablet: 1 tab(s) orally once a day (in the morning)  · 	famotidine 40 mg oral tablet: 1 tab(s) orally once a day (at bedtime)  · 	ELIQUIS 5MG TABLETS: TAKE 1 TABLET BY MOUTH TWICE DAILY  · 	ATORVASTATIN 40MG TABLETS: TAKE 1 TABLET BY MOUTH EVERY NIGHT AT BEDTIME  · 	CHOLESTYRAMINE 4 G POWD PACK:   · 	FARXIGA 10 MG TABLET:   · 	ENALAPRIL 10MG TABLETS: TAKE 1 TABLET BY MOUTH TWICE DAILY  · 	HYDROCORTISONE 2.5 % OINT. (G):   · 	LACOSAMIDE 100 MG TABLET:   · 	METOPROLOL TARTRATE 50 MG TABLET:   · 	MONTELUKAST SODIUM 10 MG TABLET:   · 	NIFEDIPINE ER 30 MG TAB ER 24:   · 	TORSEMIDE 5MG TABLETS: TAKE 1 TABLET BY MOUTH 3 TIMES A WEEK  · 	TRAMADOL HCL 50 MG TABLET:    . . .       PAST MEDICAL HISTORY:  CVA (cerebrovascular accident)   High cholesterol   HTN (hypertension)   Seizure disorder.    . . .     · Substance use	No  . . .     #Community-acquired pneumonia  CXR shows evidence of pneumonia  - Continue ceftriaxone & azithromycin  - Urinary strep & legionella ordered    #Elevated troponin   Patient presents with elevated troponin 329.3 --> 385.6 --> 335.3. EKG shows no ST changes or signs of ischemia. Likely 2/2 demand ischemia    Chronic medical conditions:  Atrial fibrillation: Continue eliquis. Holding metoprolol in the setting of permissive HTN"  <End of quote(s) from H&P>    FROM REVIEW OF EMR:    Acute R parietal infarct June 2019.  Reportedly had normal speech at the time.    "Massive stroke" in mid 2022 while on a trip to [place].  Has been non-verbal since.      Per radiology report of CT head yesterday:  "COMPARISON: CT head 8/3/2022    CONTRAST:  IV Contrast: None      TECHNIQUE:  Serial axial images were obtained from the skull base to the   vertex using multi-slice helical technique. Sagittal and coronal   reformats were obtained.    FINDINGS:    VENTRICLES AND SULCI: Age appropriate involutional changes.  INTRA-AXIAL: No mass effect, acute hemorrhage, or midline shift.  There   are periventricular and subcortical white matter hypodensities,   consistent with microvascular type changes. Chronic left frontal, left   insular, and right parietal infarcts. Left-sided wallerian degeneration.  EXTRA-AXIAL: No mass or fluid collection. Basal cisterns are normal in   appearance.    VISUALIZED SINUSES:  Clear.  TYMPANOMASTOID CAVITIES:  Clear.  VISUALIZED ORBITS: Bilateral lens replacement.  CALVARIUM: Intact.    MISCELLANEOUS: Left MCA stent.      IMPRESSION:  No acute intracranial hemorrhage, mass effect, or midline shift."    Per radiology report of CTP brain and CTAs head and neck:  "IMPRESSION:    CT PERFUSION:  Technical limitations: None.    Core infarction: 5 mL, corresponding to chronic right parietal infarct.  Penumbra / tissue at risk for active ischemia: 15 mL, large corresponding   to chronic infarct in the right parietal lobe.    CTA NECK:  No evidence of significant stenosis or occlusion.    CTA HEAD:  No large vessel occlusion. Limited evaluation of the left MCA at the   proximal and distal ends of the stent."      EXAMINATION    Awake, supine in bed.  Occasional faint muttering, but no intelligible speech.  Spoken to in Kinyarwanda by me, and [place] creole by daughter.  Variably ion repeated instruction) follows simple instruction (raise hand, open eyes, keep leg raised [after passive elevation]).      Eyelids closed most of the time.  Meiotic pupils 3mm OD (+/- reacts to bright light); 3mm OS (consistently reacts).      Appears to have a mild R mid-facial droop (compared with the L side).  Cannot assess visual fields.  Gaze grossly conjugate with full horizontal versions.    No gross UE drift. Generalized asthenia without grossly demonstrable asymmetry.  Winces mildly equally readily bilaterally and in the UEs and LEs to brief irritating stimulation.    Reflex                           Right    Left   Comment    Biceps                             2-       2-  Triceps                            0        0  Patellar                            0        0  Gastroc                           0        0  Plantar                         mute   mutre       BIBEMS from home late yesterday.  History from Admission H&P this morning.    <Start of quote(s) from H&P>  "Reason for Admission: TIA, pneumonia  History of Present Illness:   84-year-old female with PMH CVA with right-sided deficits, HTN, HLD, nonverbal at baseline, who presents today for altered mental status and new-onset weakness. As per family, patient has been exhibiting altered mental status since 630 tonight. Patient is usually able to respond yes and no to family however today she has not been able to do this. Family states that patient had increasing weakness in all extremities (although right side is weak at baseline). Weakness of upper extremities had returned to baseline on arrival to the ED, although lower extremity weakness persisted.      Review of Systems:  Review of Systems: nonverbal   . . .     Home Medications:   * Patient Currently Takes Medications as of 03-Aug-2022 18:36 documented in Structured Notes  · 	metoprolol tartrate 50 mg oral tablet: 1 tab(s) orally 2 times a day  · 	apixaban 5 mg oral tablet: 1 tab(s) orally 2 times a day  · 	enalapril 20 mg oral tablet: 1 tab(s) orally once a day  · 	aspirin 81 mg oral delayed release tablet: 1 tab(s) orally once a day  · 	hydroCHLOROthiazide 25 mg oral tablet: 1 tab(s) orally once a day (in the morning)  · 	famotidine 40 mg oral tablet: 1 tab(s) orally once a day (at bedtime)  · 	ELIQUIS 5MG TABLETS: TAKE 1 TABLET BY MOUTH TWICE DAILY  · 	ATORVASTATIN 40MG TABLETS: TAKE 1 TABLET BY MOUTH EVERY NIGHT AT BEDTIME  · 	CHOLESTYRAMINE 4 G POWD PACK:   · 	FARXIGA 10 MG TABLET:   · 	ENALAPRIL 10MG TABLETS: TAKE 1 TABLET BY MOUTH TWICE DAILY  · 	HYDROCORTISONE 2.5 % OINT. (G):   · 	LACOSAMIDE 100 MG TABLET:   · 	METOPROLOL TARTRATE 50 MG TABLET:   · 	MONTELUKAST SODIUM 10 MG TABLET:   · 	NIFEDIPINE ER 30 MG TAB ER 24:   · 	TORSEMIDE 5MG TABLETS: TAKE 1 TABLET BY MOUTH 3 TIMES A WEEK  · 	TRAMADOL HCL 50 MG TABLET:    . . .       PAST MEDICAL HISTORY:  CVA (cerebrovascular accident)   High cholesterol   HTN (hypertension)   Seizure disorder.    . . .     · Substance use	No  . . .     #Community-acquired pneumonia  CXR shows evidence of pneumonia  - Continue ceftriaxone & azithromycin  - Urinary strep & legionella ordered    #Elevated troponin   Patient presents with elevated troponin 329.3 --> 385.6 --> 335.3. EKG shows no ST changes or signs of ischemia. Likely 2/2 demand ischemia    Chronic medical conditions:  Atrial fibrillation: Continue eliquis. Holding metoprolol in the setting of permissive HTN"  <End of quote(s) from H&P>    FROM REVIEW OF EMR:    Acute R parietal infarct June 2019.  Reportedly had normal speech at the time.    "Massive stroke" in mid 2022 while on a trip to [place].  Has been non-verbal since.      Per radiology report of CT head yesterday:  "COMPARISON: CT head 8/3/2022    CONTRAST:  IV Contrast: None      TECHNIQUE:  Serial axial images were obtained from the skull base to the   vertex using multi-slice helical technique. Sagittal and coronal   reformats were obtained.    FINDINGS:    VENTRICLES AND SULCI: Age appropriate involutional changes.  INTRA-AXIAL: No mass effect, acute hemorrhage, or midline shift.  There   are periventricular and subcortical white matter hypodensities,   consistent with microvascular type changes. Chronic left frontal, left   insular, and right parietal infarcts. Left-sided wallerian degeneration.  EXTRA-AXIAL: No mass or fluid collection. Basal cisterns are normal in   appearance.    VISUALIZED SINUSES:  Clear.  TYMPANOMASTOID CAVITIES:  Clear.  VISUALIZED ORBITS: Bilateral lens replacement.  CALVARIUM: Intact.    MISCELLANEOUS: Left MCA stent.      IMPRESSION:  No acute intracranial hemorrhage, mass effect, or midline shift."    Per radiology report of CTP brain and CTAs head and neck:  "IMPRESSION:    CT PERFUSION:  Technical limitations: None.    Core infarction: 5 mL, corresponding to chronic right parietal infarct.  Penumbra / tissue at risk for active ischemia: 15 mL, large corresponding   to chronic infarct in the right parietal lobe.    CTA NECK:  No evidence of significant stenosis or occlusion.    CTA HEAD:  No large vessel occlusion. Limited evaluation of the left MCA at the   proximal and distal ends of the stent."      EXAMINATION    Awake, supine in bed.  Occasional faint muttering, but no intelligible speech.  Spoken to in Frisian by me, and Australian creole by daughter.  Variably ion repeated instruction) follows simple instruction (raise hand, open eyes, keep leg raised [after passive elevation]).      Eyelids closed most of the time.  Meiotic pupils 3mm OD (+/- reacts to bright light); 3mm OS (consistently reacts).      Appears to have a mild R mid-facial droop (compared with the L side).  Cannot assess visual fields.  Gaze grossly conjugate with full horizontal versions.    No gross UE drift. Generalized asthenia without grossly demonstrable asymmetry.  Winces mildly equally readily bilaterally and in the UEs and LEs to brief irritating stimulation.    Reflex                           Right    Left   Comment    Biceps                             2-       2-  Triceps                            0        0  Patellar                            0        0  Gastroc                           0        0  Plantar                         mute   mutre      Per family, Pt is at her baseline in terms of behavior, degree of responsiveness, motor function.    BIBEMS from home late yesterday.  History from Admission H&P this morning.    <Start of quote(s) from H&P>  "Reason for Admission: TIA, pneumonia  History of Present Illness:   84-year-old female with PMH CVA with right-sided deficits, HTN, HLD, nonverbal at baseline, who presents today for altered mental status and new-onset weakness. As per family, patient has been exhibiting altered mental status since 630 tonight. Patient is usually able to respond yes and no to family however today she has not been able to do this. Family states that patient had increasing weakness in all extremities (although right side is weak at baseline). Weakness of upper extremities had returned to baseline on arrival to the ED, although lower extremity weakness persisted.      Review of Systems:  Review of Systems: nonverbal   . . .     Home Medications:   * Patient Currently Takes Medications as of 03-Aug-2022 18:36 documented in Structured Notes  · 	metoprolol tartrate 50 mg oral tablet: 1 tab(s) orally 2 times a day  · 	apixaban 5 mg oral tablet: 1 tab(s) orally 2 times a day  · 	enalapril 20 mg oral tablet: 1 tab(s) orally once a day  · 	aspirin 81 mg oral delayed release tablet: 1 tab(s) orally once a day  · 	hydroCHLOROthiazide 25 mg oral tablet: 1 tab(s) orally once a day (in the morning)  · 	famotidine 40 mg oral tablet: 1 tab(s) orally once a day (at bedtime)  · 	ELIQUIS 5MG TABLETS: TAKE 1 TABLET BY MOUTH TWICE DAILY  · 	ATORVASTATIN 40MG TABLETS: TAKE 1 TABLET BY MOUTH EVERY NIGHT AT BEDTIME  · 	CHOLESTYRAMINE 4 G POWD PACK:   · 	FARXIGA 10 MG TABLET:   · 	ENALAPRIL 10MG TABLETS: TAKE 1 TABLET BY MOUTH TWICE DAILY  · 	HYDROCORTISONE 2.5 % OINT. (G):   · 	LACOSAMIDE 100 MG TABLET:   · 	METOPROLOL TARTRATE 50 MG TABLET:   · 	MONTELUKAST SODIUM 10 MG TABLET:   · 	NIFEDIPINE ER 30 MG TAB ER 24:   · 	TORSEMIDE 5MG TABLETS: TAKE 1 TABLET BY MOUTH 3 TIMES A WEEK  · 	TRAMADOL HCL 50 MG TABLET:    . . .       PAST MEDICAL HISTORY:  CVA (cerebrovascular accident)   High cholesterol   HTN (hypertension)   Seizure disorder.    . . .     · Substance use	No  . . .     #Community-acquired pneumonia  CXR shows evidence of pneumonia  - Continue ceftriaxone & azithromycin  - Urinary strep & legionella ordered    #Elevated troponin   Patient presents with elevated troponin 329.3 --> 385.6 --> 335.3. EKG shows no ST changes or signs of ischemia. Likely 2/2 demand ischemia    Chronic medical conditions:  Atrial fibrillation: Continue eliquis. Holding metoprolol in the setting of permissive HTN"  <End of quote(s) from H&P>    I note the Pt was on ASA 81mg daily and apixaban 5 mg BID in August 2022.  The same were ordered on this admission; today apixaban was decreased to 5mg once daily.     FROM REVIEW OF EMR:    Acute R parietal infarct June 2019.  Reportedly had normal speech at the time.    "Massive stroke" in mid 2022 while on a trip to [place].  Has been non-verbal since.      Per radiology report of CT head yesterday:  "COMPARISON: CT head 8/3/2022    CONTRAST:  IV Contrast: None      TECHNIQUE:  Serial axial images were obtained from the skull base to the   vertex using multi-slice helical technique. Sagittal and coronal   reformats were obtained.    FINDINGS:    VENTRICLES AND SULCI: Age appropriate involutional changes.  INTRA-AXIAL: No mass effect, acute hemorrhage, or midline shift.  There   are periventricular and subcortical white matter hypodensities,   consistent with microvascular type changes. Chronic left frontal, left   insular, and right parietal infarcts. Left-sided wallerian degeneration.  EXTRA-AXIAL: No mass or fluid collection. Basal cisterns are normal in   appearance.    VISUALIZED SINUSES:  Clear.  TYMPANOMASTOID CAVITIES:  Clear.  VISUALIZED ORBITS: Bilateral lens replacement.  CALVARIUM: Intact.    MISCELLANEOUS: Left MCA stent.      IMPRESSION:  No acute intracranial hemorrhage, mass effect, or midline shift."    Per radiology report of CTP brain and CTAs head and neck:  "IMPRESSION:    CT PERFUSION:  Technical limitations: None.    Core infarction: 5 mL, corresponding to chronic right parietal infarct.  Penumbra / tissue at risk for active ischemia: 15 mL, large corresponding   to chronic infarct in the right parietal lobe.    CTA NECK:  No evidence of significant stenosis or occlusion.    CTA HEAD:  No large vessel occlusion. Limited evaluation of the left MCA at the   proximal and distal ends of the stent."      EXAMINATION    Awake, supine in bed.  Occasional faint muttering, but no intelligible speech.  Spoken to in Martiniquais by me, and [place] creole by daughter.  Variably ion repeated instruction) follows simple instruction (raise hand, open eyes, keep leg raised [after passive elevation]).      Eyelids closed most of the time.  Meiotic pupils 3mm OD (+/- reacts to bright light); 3mm OS (consistently reacts).      Appears to have a mild R mid-facial droop (compared with the L side).  Cannot assess visual fields.  Gaze grossly conjugate with full horizontal versions.    No gross UE drift. Generalized asthenia without grossly demonstrable asymmetry.  Winces mildly equally readily bilaterally and in the UEs and LEs to brief irritating stimulation.    Reflex                           Right    Left   Comment    Biceps                             2-       2-  Triceps                            0        0  Patellar                            0        0  Gastroc                           0        0  Plantar                         mute   mute      Per family, Pt is at her baseline in terms of behavior, degree of responsiveness, motor function.

## 2025-05-16 NOTE — OCCUPATIONAL THERAPY INITIAL EVALUATION ADULT - GENERAL OBSERVATIONS, REHAB EVAL
Pt was seen for initial OT consult, encountered in bed on cardiac monitoring and 2 L of supplemental Oxygen. Pt is aphasic, unable to make needs & wants known pt was [place] self , confused at times,  cooperative & followed commands with perseveration and verbal cues in Citizen of [place] Creole. Translation was provided by  Radha, [place] [id number] and ID's RN Scottie. Pt denied pain or SOB. Pt presents with generalized weakness; this limits pt's activity tolerance ,balance, ADL management and functional mobility.

## 2025-05-16 NOTE — OCCUPATIONAL THERAPY INITIAL EVALUATION ADULT - PLANNED THERAPY INTERVENTIONS, OT EVAL
energy conservation techniques/ADL retraining/balance training/bed mobility training/motor coordination training/strengthening/transfer training

## 2025-05-16 NOTE — CONSULT NOTE ADULT - ASSESSMENT
Toxic metabolic encephalopathy.     CAP    Non-verbal since 2022 following stroke.      Dementia.     Generalized weakness.       Toxic metabolic encephalopathy.   Now back to baseline?    CAP    Non-verbal since 2022 following stroke.      Dementia.     Generalized weakness.    Unclear why she is on ASA and apixaban.    Unclear why apixaban dosing was reduced 50%.       Toxic metabolic encephalopathy.   Now back to baseline?    CAP    Non-verbal since 2022 following stroke.      Dementia.     Generalized weakness.    Unclear why she is on ASA and apixaban.    Unclear why apixaban dosing was reduced 50%.      Combined antiplatelet and anticoagulant Rx is contraindicated if being given for secondary stroke prevention, as the bleeding risk outweighs the .         RECOMMENDATIONS    Reassess apixaban dose.    Reassess need for ASA.    Continue atorvastatin 40mg daily; eventually adjust per lipid profile.      Non-con MR head was ordered.                                                             IMPORTANT  -  PLEASE NOTE:                              I am a neurohospitalist. I do not see patients outside of the hospital.        Patients requiring neurological follow-up after discharge may contact one of the following offices.     [facility]  [address].  [place], [place] [id number] [phone]    [address]  [place], [place]  [phone]

## 2025-05-16 NOTE — OCCUPATIONAL THERAPY INITIAL EVALUATION ADULT - ADL RETRAINING, OT EVAL
Pt will perform all aspects of upper body self care  with min assist , 1-3 verbal cues set up within 8 weeks.

## 2025-05-16 NOTE — OCCUPATIONAL THERAPY INITIAL EVALUATION ADULT - BALANCE TRAINING, PT EVAL
Pt will increased standing balance from fair minus/ poor + to fair in 8 weeks to prevent falls, optimize pt's ability for ADL management & safely navigate in all terrains

## 2025-05-16 NOTE — SWALLOW BEDSIDE ASSESSMENT ADULT - ORAL PHASE
residue on right side- pt needed cues to swallow/Decreased anterior-posterior movement of the bolus/Delayed oral transit time/Stasis in lateral sulci Delayed oral transit time

## 2025-05-16 NOTE — OCCUPATIONAL THERAPY INITIAL EVALUATION ADULT - IMPAIRMENTS CONTRIBUTING IMPAIRED BED MOBILITY, REHAB EVAL
impaired balance/cognition/impaired coordination/impaired motor control/narrow base of support/impaired sensory feedback

## 2025-05-16 NOTE — SWALLOW BEDSIDE ASSESSMENT ADULT - COMMENTS
pt seen bedside seated upright for po trials on NC02 and alert. pt responded to simple questions for assessment, verbalized wants and noted difficulty following directions. 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 02 saturation post po trials 98-99    CT head 5/15/2025 IMPRESSION: No acute intracranial hemorrhage, mass effect, or midline shift. pt seen bedside seated upright for po trials on NC02 and alert. pt responded to simple questions for assessment, verbalized wants and noted difficulty following directions. 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 02 saturation post po trials 98-99    CT head 5/15/2025 IMPRESSION: No acute intracranial hemorrhage, mass effect, or midline shift.    per charting swallow history clinical bedside swallow evaluation completed at [facility] 10/15/2021 with recommendations for puree/moderate thick liquid. see report for details

## 2025-05-16 NOTE — OCCUPATIONAL THERAPY INITIAL EVALUATION ADULT - TRANSFER TRAINING, PT EVAL
Pt will transfer to all surfaces, safely and independently with min assist /rolling walker 8-1 2 weeks.

## 2025-05-16 NOTE — PATIENT PROFILE ADULT - FALL HARM RISK - HARM RISK INTERVENTIONS
Assistance with ambulation/Assistance OOB with selected safe patient handling equipment/Communicate Risk of Fall with Harm to all staff/Discuss with provider need for PT consult/Monitor gait and stability/Provide patient with walking aids - walker, cane, crutches/Reinforce activity limits and safety measures with patient and family/Sit up slowly, dangle for a short time, stand at bedside before walking/Tailored Fall Risk Interventions/Visual Cue: Yellow wristband and red socks/Bed in lowest position, wheels locked, appropriate side rails in place/Call bell, personal items and telephone in reach/Instruct patient to call for assistance before getting out of bed or chair/Non-slip footwear when patient is out of bed/[place] to call system/Physically safe environment - no spills, clutter or unnecessary equipment/Purposeful Proactive Rounding/Room/bathroom lighting operational, light cord in reach

## 2025-05-16 NOTE — OCCUPATIONAL THERAPY INITIAL EVALUATION ADULT - TRANSFER SAFETY CONCERNS NOTED: TOILET, REHAB EVAL
decreased balance during turns/decreased safety awareness/squat pivot/decreased step length/decreased weight-shifting ability

## 2025-05-16 NOTE — OCCUPATIONAL THERAPY INITIAL EVALUATION ADULT - IMPAIRED TRANSFERS: TOILET, REHAB EVAL
ataxic/impaired balance/cognition/impaired coordination/narrow base of support/impaired postural control/decreased ROM/decreased strength

## 2025-05-16 NOTE — OCCUPATIONAL THERAPY INITIAL EVALUATION ADULT - ADDITIONAL COMMENTS
Prior to admission, pt was  ambulating independently with assistance with a rolling walker. Pt has a HHA 8-9 hours daily to assist with BADL and IADL. Presently, pt needs assistance with functional mobility and self care tasks due to generalized weakness, stiffness and decreased balance.  Pt is right hand dominant. Pt performed bed mobility with min assist and use of bed rail. Pt transferred sit to stand , bed to chair with mod assist x2 and use of rolling walker. Pt had difficulty with propulsion if LLE due to weakness. Pt sat OOB to chair for approximately 10 minutes before vomiting phlegm. Pt was returned to bed with mod assist x2 Prior to admission, pt was  ambulating with assistance with a rolling walker. Pt has a HHA 8-9 hours daily to assist with BADL and IADL. Presently, pt needs assistance with functional mobility and self care tasks due to generalized weakness, stiffness and decreased balance.  Pt is right hand dominant. Pt performed bed mobility with min assist and use of bed rail. Pt transferred sit to stand , bed to chair with mod assist x2 and use of rolling walker. Pt had difficulty with propulsion if LLE due to weakness. Pt sat OOB to chair for approximately 10 minutes before vomiting phlegm. Pt was returned to bed with mod assist x2

## 2025-05-16 NOTE — OCCUPATIONAL THERAPY INITIAL EVALUATION ADULT - LIVES WITH, PROFILE
As per family member Delano Dorsaint , via telephone, pt lives with  her 2 daughters in a private house with 5 entry steps equipped with bilateral hand rails that can be reached simultaneously. All living amenities are located on one level. The bathroom has a walk in shower, grab bars, fixed/ retractable shower head and standard toilet.

## 2025-05-16 NOTE — OCCUPATIONAL THERAPY INITIAL EVALUATION ADULT - FINE MOTOR COORDINATION, RIGHT HAND, FINGER TO NOSE, OT EVAL
Dr. Maegan Osorio Kirstin Grayson MD moderate impairment

## 2025-05-16 NOTE — OCCUPATIONAL THERAPY INITIAL EVALUATION ADULT - TRANSFER SAFETY CONCERNS NOTED: BED/CHAIR, REHAB EVAL
decreased balance during turns/decreased safety awareness/losing balance/decreased sequencing ability/squat pivot/stand pivot/decreased step length/decreased weight-shifting ability

## 2025-05-16 NOTE — H&P ADULT - ASSESSMENT
#Community-acquired pneumonia    #Elevated troponin  84-year-old female with PMH CVA with right-sided deficits, HTN, HLD, nonverbal at baseline, who presents today for altered mental status and new-onset weakness, admitted for TIA and CAP    #Stroke  Patient presents with increasing weakness in all 4 extremities, upper extremity weakness improved on arrovi  - ASA & atorvastatin  - Neurology consult in the AM  - MRI ordered  - NPO pending S&S eval  - PT/OT/SLP  - Case management & social work consulted  - Neuro checks q4 hrs  - Permissive HTN x 24-48 hours   - HgbA1c, lipid panel pending    #Community-acquired pneumonia    #Elevated troponin  84-year-old female with PMH CVA with right-sided deficits, HTN, HLD, nonverbal at baseline, who presents today for altered mental status and new-onset weakness, admitted for TIA and CAP    #Stroke  Patient presents with increasing weakness in all 4 extremities, upper extremity weakness improved on arrival.   Imaging:   CT head: No acute intracranial hemorrhage, mass effect, or midline shift.  CT PERFUSION:  Technical limitations: None.  Core infarction: 5 mL, corresponding to chronic right parietal infarct.  Penumbra / tissue at risk for active ischemia: 15 mL, large corresponding to chronic infarct in the right parietal lobe.  CTA NECK:  No evidence of significant stenosis or occlusion.  CTA HEAD:  No large vessel occlusion. Limited evaluation of the left MCA at the proximal and distal ends of the stent.  - ASA & atorvastatin  - Neurology consult in the AM  - MRI ordered  - NPO pending S&S eval  - PT/OT/SLP  - Case management & social work consulted  - Neuro checks q4 hrs  - Permissive HTN x 24-48 hours   - HgbA1c, lipid panel pending    #Community-acquired pneumonia  CXR shows evidence of pneumonia  - Continue ceftriaxone & azithromycin  - Urinary strep & legionella ordered    #Elevated troponin   Patient presents with elevated troponin 329.3 --> 385.6 --> 335.3. EKG shows no ST changes or signs of ischemia. Likely 2/2 demand ischemia    Chronic medical conditions:  Atrial fibrillation: Continue eliquis. Holding metoprolol in the setting of permissive HTN  Seizure disorder: Continue lacosamide    DVT PPx: Eliquis  Medication list acquired from [facility]. Please confirm medication list with pharmacy.

## 2025-05-16 NOTE — OCCUPATIONAL THERAPY INITIAL EVALUATION ADULT - PRECAUTIONS/LIMITATIONS, REHAB EVAL
Pt has speech impairment, decreased executive function with cognitive limitations./fall precautions/obesity precautions

## 2025-05-16 NOTE — OCCUPATIONAL THERAPY INITIAL EVALUATION ADULT - PERTINENT HX OF CURRENT PROBLEM, REHAB EVAL
Pt is an 85 y/o  Wallisian Creole speaking female who presented to ER on due to acute onset of neurological deficit. Pt is diagnosed with Pneumonia and TIA. As per chair "pt  presents today for altered mental status and new-onset weakness. As per family, patient has been exhibiting altered mental status since 630 tonight. Patient is usually able to respond yes and no to family however today she has not been able to do this. Family states that patient had increasing weakness in all extremities (although right side is weak at baseline). Weakness of upper extremities had returned to baseline on arrival to the ED, although lower extremity weakness persisted". Pt has CVA with right-sided deficits, HTN, HLD, nonverbal at baseline. MRI is pending . Head on 5/26/25  results confirm Chronic left frontal, left insular, and right parietal infarcts.

## 2025-05-17 LAB
A1C WITH ESTIMATED AVERAGE GLUCOSE RESULT: 6.2 % — HIGH (ref 4–5.6)
CHOLEST SERPL-MCNC: 153 MG/DL — SIGNIFICANT CHANGE UP
ESTIMATED AVERAGE GLUCOSE: 131 MG/DL — HIGH (ref 68–114)
GLUCOSE BLDC GLUCOMTR-MCNC: 102 MG/DL — HIGH (ref 70–99)
GLUCOSE BLDC GLUCOMTR-MCNC: 112 MG/DL — HIGH (ref 70–99)
GLUCOSE BLDC GLUCOMTR-MCNC: 112 MG/DL — HIGH (ref 70–99)
GLUCOSE BLDC GLUCOMTR-MCNC: 133 MG/DL — HIGH (ref 70–99)
HDLC SERPL-MCNC: 60 MG/DL — SIGNIFICANT CHANGE UP
LDLC SERPL-MCNC: 80 MG/DL — SIGNIFICANT CHANGE UP
LEGIONELLA AG UR QL: NEGATIVE — SIGNIFICANT CHANGE UP
LIPID PNL WITH DIRECT LDL SERPL: 80 MG/DL — SIGNIFICANT CHANGE UP
NONHDLC SERPL-MCNC: 92 MG/DL — SIGNIFICANT CHANGE UP
PROCALCITONIN SERPL-MCNC: 0.16 NG/ML — HIGH (ref 0.02–0.1)
S PNEUM AG UR QL: NEGATIVE — SIGNIFICANT CHANGE UP
TRIGL SERPL-MCNC: 62 MG/DL — SIGNIFICANT CHANGE UP

## 2025-05-17 PROCEDURE — 99232 SBSQ HOSP IP/OBS MODERATE 35: CPT

## 2025-05-17 RX ORDER — LORAZEPAM 4 MG/ML
1 VIAL (ML) INJECTION ONCE
Refills: 0 | Status: DISCONTINUED | OUTPATIENT
Start: 2025-05-17 | End: 2025-05-18

## 2025-05-17 RX ORDER — LACTULOSE 10 G/15ML
20 SOLUTION ORAL ONCE
Refills: 0 | Status: COMPLETED | OUTPATIENT
Start: 2025-05-17 | End: 2025-05-17

## 2025-05-17 RX ORDER — LOSARTAN POTASSIUM 100 MG/1
25 TABLET, FILM COATED ORAL DAILY
Refills: 0 | Status: DISCONTINUED | OUTPATIENT
Start: 2025-05-17 | End: 2025-05-18

## 2025-05-17 RX ADMIN — METOPROLOL SUCCINATE 25 MILLIGRAM(S): 50 TABLET, EXTENDED RELEASE ORAL at 05:22

## 2025-05-17 RX ADMIN — POLYETHYLENE GLYCOL 3350 17 GRAM(S): 17 POWDER, FOR SOLUTION ORAL at 05:22

## 2025-05-17 RX ADMIN — APIXABAN 5 MILLIGRAM(S): 2.5 TABLET, FILM COATED ORAL at 12:02

## 2025-05-17 RX ADMIN — Medication 650 MILLIGRAM(S): at 01:00

## 2025-05-17 RX ADMIN — MONTELUKAST SODIUM 10 MILLIGRAM(S): 10 TABLET ORAL at 12:02

## 2025-05-17 RX ADMIN — POLYETHYLENE GLYCOL 3350 17 GRAM(S): 17 POWDER, FOR SOLUTION ORAL at 17:19

## 2025-05-17 RX ADMIN — Medication 4 GRAM(S): at 09:34

## 2025-05-17 RX ADMIN — Medication 75 MILLILITER(S): at 01:01

## 2025-05-17 RX ADMIN — LACOSAMIDE 100 MILLIGRAM(S): 150 TABLET, FILM COATED ORAL at 05:22

## 2025-05-17 RX ADMIN — LACOSAMIDE 100 MILLIGRAM(S): 150 TABLET, FILM COATED ORAL at 17:18

## 2025-05-17 RX ADMIN — Medication 2 TABLET(S): at 12:02

## 2025-05-17 RX ADMIN — LACTULOSE 20 GRAM(S): 10 SOLUTION ORAL at 17:18

## 2025-05-17 RX ADMIN — LOSARTAN POTASSIUM 25 MILLIGRAM(S): 100 TABLET, FILM COATED ORAL at 12:01

## 2025-05-17 NOTE — PHYSICAL THERAPY INITIAL EVALUATION ADULT - GENERAL OBSERVATIONS, REHAB EVAL
Pt encountered semi-fowlers in bed w/ NAD, Lethargic xOx2, +Hatian Creole Speaking (daughter present to translate), +primafit, +EKG, +IV, NC 2L; reports 0/10. Pt encountered semi-fowlers in bed w/ NAD, Lethargic xOx2, +Hatian Creole Speaking (daughter present to translate), +primafit, +EKG, +IV; reports 0/10.

## 2025-05-17 NOTE — PHYSICAL THERAPY INITIAL EVALUATION ADULT - PERTINENT HX OF CURRENT PROBLEM, REHAB EVAL
84-year-old female with PMH CVA with right-sided deficits, HTN, HLD, nonverbal at baseline, who presents today for altered mental status and new-onset weakness, admitted for TIA and CAP

## 2025-05-17 NOTE — PROGRESS NOTE ADULT - SUBJECTIVE AND OBJECTIVE BOX
CHIEF COMPLAINT: Patient does not speak.   Per daughter at bedside, new left sided weakness is resolved.   Patient has chronic right sided weakness  no report of any fever or n/v/d/abd pain or cp    PHYSICAL EXAM:  GENERAL: Elderly and no acute distress   CHEST/LUNG:  No wheezing, Few crackles right base. Decreased air entry bibasally.    HEART: Regular rate and rhythm; +SM  ABDOMEN: Soft, Nontender, Nondistended; Bowel sounds present  EXTREMITIES:  No clubbing, cyanosis, or edema  NERVOUS SYSTEM:  aphasic and + chronic right sided weakness.   Psychiatry: AA and followed commands. Does not speak.       OBJECTIVE DATA:   Vital Signs Last 24 Hrs  T(C): 36.6 (16 May 2025 10:21), Max: 36.6 (15 May 2025 21:51)  T(F): 97.8 (16 May 2025 10:21), Max: 97.9 (15 May 2025 21:51)  HR: 80 (16 May 2025 10:21) (80 - 86)  BP: 142/92 (16 May 2025 10:21) (118/93 - 177/97)  BP(mean): --  RR: 18 (16 May 2025 10:21) (16 - 22)  SpO2: 99% (16 May 2025 10:21) (95% - 99%)    Parameters below as of 16 May 2025 08:45  Patient On (Oxygen Delivery Method): nasal cannula  O2 Flow (L/min): 2  LABS:                        14.1   9.56  )-----------( 200      ( 16 May 2025 07:59 )             43.6             05-16    137  |  106  |  14  ----------------------------<  167[H]  4.3   |  23  |  1.00    Ca    9.4      16 May 2025 03:14    TPro  8.4[H]  /  Alb  3.3  /  TBili  0.3  /  DBili  x   /  AST  30  /  ALT  23  /  AlkPhos  159[H]  05-16              PT/INR - ( 15 May 2025 21:06 )   PT: 13.5 sec;   INR: 1.17 ratio         PTT - ( 15 May 2025 21:06 )  PTT:31.6 sec  Urinalysis Basic - ( 16 May 2025 03:14 )    Color: x / Appearance: x / SG: x / pH: x  Gluc: 167 mg/dL / Ketone: x  / Bili: x / Urobili: x   Blood: x / Protein: x / Nitrite: x   Leuk Esterase: x / RBC: x / WBC x   Sq Epi: x / Non Sq Epi: x / Bacteria: x            CAPILLARY BLOOD GLUCOSE      POCT Blood Glucose.: 114 mg/dL (16 May 2025 11:10)     Interval Radiology studies: reviewed by me  < from: Xray Chest 1 View- PORTABLE-Urgent (05.15.25 @ 21:54) >  IMPRESSION:    No acute infiltrate. Cardiomegaly.    < end of copied text >      MEDICATIONS  (STANDING):  apixaban 5 milliGRAM(s) Oral <User Schedule>  aspirin enteric coated 81 milliGRAM(s) Oral daily  atorvastatin 40 milliGRAM(s) Oral at bedtime  azithromycin  IVPB 500 milliGRAM(s) IV Intermittent every 24 hours  cefTRIAXone   IVPB 1000 milliGRAM(s) IV Intermittent every 24 hours  cholestyramine Powder (Sugar-Free) 4 Gram(s) Oral daily  dextrose 5%. 1000 milliLiter(s) (100 mL/Hr) IV Continuous <Continuous>  dextrose 5%. 1000 milliLiter(s) (50 mL/Hr) IV Continuous <Continuous>  dextrose 50% Injectable 25 Gram(s) IV Push once  dextrose 50% Injectable 12.5 Gram(s) IV Push once  dextrose 50% Injectable 25 Gram(s) IV Push once  glucagon  Injectable 1 milliGRAM(s) IntraMuscular once  insulin lispro (ADMELOG) corrective regimen sliding scale   SubCutaneous three times a day before meals  lacosamide 100 milliGRAM(s) Oral two times a day  montelukast 10 milliGRAM(s) Oral daily  polyethylene glycol 3350 17 Gram(s) Oral two times a day  senna 2 Tablet(s) Oral daily  sodium chloride 0.9%. 1000 milliLiter(s) (75 mL/Hr) IV Continuous <Continuous>    MEDICATIONS  (PRN):  acetaminophen     Tablet .. 650 milliGRAM(s) Oral every 6 hours PRN Temp greater or equal to 38C (100.4F), Mild Pain (1 - 3)  aluminum hydroxide/magnesium hydroxide/simethicone Suspension 30 milliLiter(s) Oral every 4 hours PRN Dyspepsia  dextrose Oral Gel 15 Gram(s) Oral once PRN Blood Glucose LESS THAN 70 milliGRAM(s)/deciliter  melatonin 3 milliGRAM(s) Oral at bedtime PRN Insomnia  ondansetron Injectable 4 milliGRAM(s) IV Push every 8 hours PRN Nausea and/or Vomiting      
CHIEF COMPLAINT: patient anxious per nurse  no BM  MRI still pending  no fever  no vomiting  no active gross bleeding   Emma daughter helped with conversation.     PHYSICAL EXAM:  GENERAL: Obese, on 2 liter NC oxygen   CHEST/LUNG:  No wheezing, no crackles   HEART: Regular rate and rhythm; No murmurs  ABDOMEN: Soft, Nontender, Nondistended; Bowel sounds present  EXTREMITIES:  No clubbing, cyanosis, or edema      OBJECTIVE DATA:   Vital Signs Last 24 Hrs  T(C): 36.2 (17 May 2025 10:54), Max: 36.9 (17 May 2025 00:12)  T(F): 97.2 (17 May 2025 10:54), Max: 98.5 (17 May 2025 00:12)  HR: 82 (17 May 2025 10:54) (82 - 96)  BP: 138/95 (17 May 2025 10:54) (138/95 - 176/89)  BP(mean): 110 (17 May 2025 00:12) (110 - 110)  RR: 18 (17 May 2025 10:54) (17 - 18)  SpO2: 97% (17 May 2025 10:54) (96% - 99%)      Daily Weight in k (17 May 2025 04:55)  LABS:                        14.1   9.56  )-----------( 200      ( 16 May 2025 07:59 )             43.6             05-16    137  |  106  |  14  ----------------------------<  167[H]  4.3   |  23  |  1.00    Ca    9.4      16 May 2025 03:14    TPro  8.4[H]  /  Alb  3.3  /  TBili  0.3  /  DBili  x   /  AST  30  /  ALT  23  /  AlkPhos  159[H]  05-16              PT/INR - ( 15 May 2025 21:06 )   PT: 13.5 sec;   INR: 1.17 ratio         PTT - ( 15 May 2025 21:06 )  PTT:31.6 sec  Urinalysis Basic - ( 16 May 2025 03:14 )    Color: x / Appearance: x / SG: x / pH: x  Gluc: 167 mg/dL / Ketone: x  / Bili: x / Urobili: x   Blood: x / Protein: x / Nitrite: x   Leuk Esterase: x / RBC: x / WBC x   Sq Epi: x / Non Sq Epi: x / Bacteria: x            CAPILLARY BLOOD GLUCOSE      POCT Blood Glucose.: 102 mg/dL (17 May 2025 11:04)      Culture - Urine  Source: Clean Catch  Final Report ():    >=3 organisms. Probable collection contamination.    Interval Radiology studies: reviewed by me  < from: Xray Chest 1 View- PORTABLE-Urgent (05.15.25 @ 21:54) >    No acute infiltrate. Cardiomegaly.    < end of copied text >    MEDICATIONS  (STANDING):  apixaban 5 milliGRAM(s) Oral <User Schedule>  atorvastatin 40 milliGRAM(s) Oral at bedtime  azithromycin  IVPB 500 milliGRAM(s) IV Intermittent every 24 hours  cefTRIAXone   IVPB 1000 milliGRAM(s) IV Intermittent every 24 hours  cholestyramine Powder (Sugar-Free) 4 Gram(s) Oral daily  dextrose 5%. 1000 milliLiter(s) (100 mL/Hr) IV Continuous <Continuous>  dextrose 5%. 1000 milliLiter(s) (50 mL/Hr) IV Continuous <Continuous>  dextrose 50% Injectable 25 Gram(s) IV Push once  dextrose 50% Injectable 12.5 Gram(s) IV Push once  dextrose 50% Injectable 25 Gram(s) IV Push once  glucagon  Injectable 1 milliGRAM(s) IntraMuscular once  insulin lispro (ADMELOG) corrective regimen sliding scale   SubCutaneous three times a day before meals  lacosamide 100 milliGRAM(s) Oral two times a day  lactulose Syrup 20 Gram(s) Oral once  LORazepam     Tablet 1 milliGRAM(s) Oral once  losartan 25 milliGRAM(s) Oral daily  metoprolol succinate ER 25 milliGRAM(s) Oral daily  montelukast 10 milliGRAM(s) Oral daily  polyethylene glycol 3350 17 Gram(s) Oral two times a day  senna 2 Tablet(s) Oral daily    MEDICATIONS  (PRN):  acetaminophen     Tablet .. 650 milliGRAM(s) Oral every 6 hours PRN Temp greater or equal to 38C (100.4F), Mild Pain (1 - 3)  aluminum hydroxide/magnesium hydroxide/simethicone Suspension 30 milliLiter(s) Oral every 4 hours PRN Dyspepsia  dextrose Oral Gel 15 Gram(s) Oral once PRN Blood Glucose LESS THAN 70 milliGRAM(s)/deciliter  melatonin 3 milliGRAM(s) Oral at bedtime PRN Insomnia  ondansetron Injectable 4 milliGRAM(s) IV Push every 8 hours PRN Nausea and/or Vomiting

## 2025-05-17 NOTE — PROGRESS NOTE ADULT - ASSESSMENT
84-year-old female with PMH CVA with right-sided deficits, HTN, HLD, nonverbal at baseline, who presents today for altered mental status and new-onset weakness, admitted for TIA and CAP    #acute Stroke is ruled out. Patient likely has TIA. patient has hx of chronic stroke with right sided weakness.   Patient presents with increasing weakness in all 4 extremities, upper extremity weakness improved on arrival.   Imaging:   CT head: No acute intracranial hemorrhage, mass effect, or midline shift.  CT PERFUSION:  Technical limitations: None.  Core infarction: 5 mL, corresponding to chronic right parietal infarct.  Penumbra / tissue at risk for active ischemia: 15 mL, large corresponding to chronic infarct in the right parietal lobe.  CTA NECK:  No evidence of significant stenosis or occlusion.  CTA HEAD:  No large vessel occlusion. Limited evaluation of the left MCA at the proximal and distal ends of the stent.  - Cont Eliquis & atorvastatin  - MRI brain pending. Give ativan x 1 to help with imaging.   - On pureed diet with aspiration precautions.   - PT/OT    - Neuro checks q4 hrs  - Discussed with Dr Reid. Follow recs.   - Follow labs.     #Community-acquired pneumonia  CXR shows evidence of pneumonia  - Continue iv antibiotics.    Reviewed procalcitonin.     #Elevated troponin   Patient presents with elevated troponin 329.3 --> 385.6 --> 335.3. EKG shows no ST changes or signs of ischemia. Likely 2/2 demand ischemia    # Constipation. Give lactulose.     Chronic medical conditions:  Chronic Atrial fibrillation: Continue eliquis, and metoprolol. DC aspirin.   Seizure disorder: Continue lacosamide. Seizure precautions.   HTN. uncontrolled. Patient is pre-diabetic. Added losartan. Monitor.   Moblity: Patient uses cane at home for ambulation. Daughter would like her to come home after discharge.     DVT PPx: Eliquis      Time spent by me managing the patient including but not limited to reviewing the chart, discussion with the IDR team (nurse//care manager/ACP), physical exam and assessment and plan is 38 mins     
84-year-old female with PMH CVA with right-sided deficits, HTN, HLD, nonverbal at baseline, who presents today for altered mental status and new-onset weakness, admitted for TIA and CAP    #acute Stroke is ruled out. Patient likely has TIA. patient has hx of chronic stroke with right sided weakness.   Patient presents with increasing weakness in all 4 extremities, upper extremity weakness improved on arrival.   Imaging:   CT head: No acute intracranial hemorrhage, mass effect, or midline shift.  CT PERFUSION:  Technical limitations: None.  Core infarction: 5 mL, corresponding to chronic right parietal infarct.  Penumbra / tissue at risk for active ischemia: 15 mL, large corresponding to chronic infarct in the right parietal lobe.  CTA NECK:  No evidence of significant stenosis or occlusion.  CTA HEAD:  No large vessel occlusion. Limited evaluation of the left MCA at the proximal and distal ends of the stent.  - Cont ASA & atorvastatin  - Neurology consulted. Follow recs.   - MRI brain pending.   - On pureed diet with aspiration precautions.   - PT/OT in progress.   - Neuro checks q4 hrs    #Community-acquired pneumonia  CXR shows evidence of pneumonia  - Continue ceftriaxone & azithromycin  - Urinary strep & legionella pending.   check procalcitonin.     #Elevated troponin   Patient presents with elevated troponin 329.3 --> 385.6 --> 335.3. EKG shows no ST changes or signs of ischemia. Likely 2/2 demand ischemia    Chronic medical conditions:  Chronic Atrial fibrillation: Continue eliquis, aspirin and start metoprolol.   Seizure disorder: Continue lacosamide. Seizure precautions.     Moblity: Patient uses cane at home for ambulation. Daughter would like her to come home after discharge.     DVT PPx: Eliquis  FC    Time spent by me managing the patient including but not limited to reviewing the chart, discussion with the IDR team (nurse//care manager/ACP), physical exam and assessment and plan is 54 mins

## 2025-05-17 NOTE — PHYSICAL THERAPY INITIAL EVALUATION ADULT - GAIT DEVIATIONS NOTED, PT EVAL
decreased osmel/increased time in double stance/decreased velocity of limb motion/decreased step length/decreased stride length/decreased weight-shifting ability

## 2025-05-17 NOTE — PHYSICAL THERAPY INITIAL EVALUATION ADULT - GAIT DISTANCE, PT EVAL
Recommendation Preamble: The following recommendations were made during the visit:\\n-Continue Lume deodorant \\n-Discussed 50 mg Zinc QD after food to act as an anti-inflammatory\\n-Discussed laser hair removal
Detail Level: Zone
1 side step

## 2025-05-17 NOTE — PHYSICAL THERAPY INITIAL EVALUATION ADULT - BALANCE TRAINING, PT EVAL
----- Message from Belinda Padilla LPN sent at 8/9/2023 12:40 PM CDT -----  Contact: Patient    ----- Message -----  From: Gracia Macario  Sent: 8/9/2023  12:03 PM CDT  To: Yakov LEOS Staff    Type:  Needs Medical Advice    Who Called:   Patient    Would the patient rather a call back or a response via [facility]?   Call back  Best Call Back Number:   [phone]    Additional Information: States he is experiencing very bad back pain and wants to see if there is anything you can send in for him before his MRI - please call to advise - thank you           Pt will improve to fair minus balance in all categories within 4 weeks.

## 2025-05-17 NOTE — PHYSICAL THERAPY INITIAL EVALUATION ADULT - STRENGTHENING, PT EVAL
Pt will improve general strength to an MMT grade of 3/5; enough to improve transfer, stair, and gait efficiency within x4 weeks.

## 2025-05-17 NOTE — PHYSICAL THERAPY INITIAL EVALUATION ADULT - ADDITIONAL COMMENTS
Pt is a poor historian, per daughter: Pt lives in pvt house w/ 2 KERON (b/l close rails). Requires assistance w/ all ADL's and functional mobility. HHA 7x/week for 9 hours a day. Owns SAC, RW, WC. Family has Home PT come 3x/week as of right up to prior the admission.

## 2025-05-18 VITALS — HEART RATE: 72 BPM

## 2025-05-18 LAB
GLUCOSE BLDC GLUCOMTR-MCNC: 135 MG/DL — HIGH (ref 70–99)
GLUCOSE BLDC GLUCOMTR-MCNC: 94 MG/DL — SIGNIFICANT CHANGE UP (ref 70–99)

## 2025-05-18 PROCEDURE — 99232 SBSQ HOSP IP/OBS MODERATE 35: CPT

## 2025-05-18 PROCEDURE — 99239 HOSP IP/OBS DSCHRG MGMT >30: CPT

## 2025-05-18 RX ORDER — LACOSAMIDE 150 MG/1
0 TABLET, FILM COATED ORAL
Qty: 0 | Refills: 0 | DISCHARGE

## 2025-05-18 RX ORDER — LACOSAMIDE 150 MG/1
1 TABLET, FILM COATED ORAL
Qty: 0 | Refills: 0 | DISCHARGE
Start: 2025-05-18

## 2025-05-18 RX ORDER — NIFEDIPINE 30 MG
0 TABLET, EXTENDED RELEASE 24 HR ORAL
Qty: 0 | Refills: 0 | DISCHARGE

## 2025-05-18 RX ORDER — ATORVASTATIN CALCIUM 80 MG/1
1 TABLET, FILM COATED ORAL
Qty: 0 | Refills: 0 | DISCHARGE

## 2025-05-18 RX ORDER — DAPAGLIFLOZIN 5 MG/1
1 TABLET, FILM COATED ORAL
Qty: 0 | Refills: 0 | DISCHARGE

## 2025-05-18 RX ORDER — SENNA 187 MG
2 TABLET ORAL
Qty: 0 | Refills: 0 | DISCHARGE
Start: 2025-05-18

## 2025-05-18 RX ORDER — METOPROLOL SUCCINATE 50 MG/1
0 TABLET, EXTENDED RELEASE ORAL
Qty: 0 | Refills: 0 | DISCHARGE

## 2025-05-18 RX ORDER — APIXABAN 2.5 MG/1
1 TABLET, FILM COATED ORAL
Qty: 0 | Refills: 0 | DISCHARGE
Start: 2025-05-18

## 2025-05-18 RX ORDER — APIXABAN 2.5 MG/1
0 TABLET, FILM COATED ORAL
Qty: 0 | Refills: 0 | DISCHARGE

## 2025-05-18 RX ORDER — AMOXICILLIN AND CLAVULANATE POTASSIUM 500; 125 MG/1; MG/1
1 TABLET, FILM COATED ORAL
Qty: 6 | Refills: 0
Start: 2025-05-18 | End: 2025-05-20

## 2025-05-18 RX ORDER — POLYETHYLENE GLYCOL 3350 17 G/17G
17 POWDER, FOR SOLUTION ORAL
Qty: 0 | Refills: 0 | DISCHARGE
Start: 2025-05-18

## 2025-05-18 RX ORDER — ENALAPRIL MALEATE 20 MG
0 TABLET ORAL
Qty: 0 | Refills: 0 | DISCHARGE

## 2025-05-18 RX ORDER — HYDROCORTISONE 10 MG/G
0 CREAM TOPICAL
Qty: 0 | Refills: 0 | DISCHARGE

## 2025-05-18 RX ORDER — TRAMADOL HYDROCHLORIDE 50 MG/1
0 TABLET, FILM COATED ORAL
Qty: 0 | Refills: 0 | DISCHARGE

## 2025-05-18 RX ORDER — MONTELUKAST SODIUM 10 MG/1
1 TABLET ORAL
Qty: 0 | Refills: 0 | DISCHARGE

## 2025-05-18 RX ADMIN — Medication 4 GRAM(S): at 11:32

## 2025-05-18 RX ADMIN — LOSARTAN POTASSIUM 25 MILLIGRAM(S): 100 TABLET, FILM COATED ORAL at 05:42

## 2025-05-18 RX ADMIN — MONTELUKAST SODIUM 10 MILLIGRAM(S): 10 TABLET ORAL at 11:32

## 2025-05-18 RX ADMIN — ATORVASTATIN CALCIUM 40 MILLIGRAM(S): 80 TABLET, FILM COATED ORAL at 01:27

## 2025-05-18 RX ADMIN — Medication 255 MILLIGRAM(S): at 01:27

## 2025-05-18 RX ADMIN — APIXABAN 5 MILLIGRAM(S): 2.5 TABLET, FILM COATED ORAL at 01:27

## 2025-05-18 RX ADMIN — LACOSAMIDE 100 MILLIGRAM(S): 150 TABLET, FILM COATED ORAL at 05:42

## 2025-05-18 RX ADMIN — METOPROLOL SUCCINATE 25 MILLIGRAM(S): 50 TABLET, EXTENDED RELEASE ORAL at 05:43

## 2025-05-18 RX ADMIN — APIXABAN 5 MILLIGRAM(S): 2.5 TABLET, FILM COATED ORAL at 11:32

## 2025-05-18 RX ADMIN — CEFTRIAXONE 100 MILLIGRAM(S): 500 INJECTION, POWDER, FOR SOLUTION INTRAMUSCULAR; INTRAVENOUS at 01:26

## 2025-05-18 NOTE — DISCHARGE NOTE PROVIDER - HOSPITAL COURSE
84-year-old female with PMH CVA with right-sided deficits, HTN, HLD, nonverbal at baseline, who presents today for altered mental status and new-onset weakness, admitted for TIA and CAP    #acute Stroke is ruled out. Patient likely has TIA. patient has hx of chronic stroke with right sided weakness.   Patient presents with increasing weakness in all 4 extremities, upper extremity weakness improved on arrival.   weakness improved. Discussed with neurologist. MRI brain can be done outpatient. Discussed with patient and daughter at bedside>> refused to get MRI inpatient. will follow up with PCP for outpatient MRI.   cont current home meds. Patient already has HHA and home PT. discussed with care manager>>> no need for F2F now.     #Community-acquired pneumonia  CXR shows evidence of pneumonia  - s/p iv antibiotics.    improved significantly.   prescribed oral antibiotic.     #Elevated troponin   Patient presents with elevated troponin 329.3 --> 385.6 --> 335.3. EKG shows no ST changes or signs of ischemia. Likely 2/2 demand ischemia    # Constipation. BM x 3 yesterday. improved. Prescribed lactulose at home.     Chronic medical conditions:  Chronic Atrial fibrillation: Continue eliquis, and metoprolol.    Seizure disorder: Continue lacosamide. Seizure precautions.   HTN. uncontrolled. Patient is pre-diabetic. cont current meds. MOnitor blood pressure at home and keep a log for PCP.   Moblity: Home PT. Patient and Daughter refused ANA.     Seen and examined by me today. Vitals stable.   I have discussed all the inpatient radiographic findings with the patient's daughter and stressed that patient follows with the PCP for further outpatient care. I have educated patient to use [facility] patient portal (as instructed on the discharge paperwork) to access medical records outside the hospital.   All questions welcomed and answered appropriately. She verbalized understanding of post discharge physician's follows up and discharge instructions.   DC time spent by me face to face excluding billable procedures 38 mins     84-year-old female with PMH CVA with right-sided deficits, HTN, HLD, nonverbal at baseline, who presents today for altered mental status and new-onset weakness, admitted for TIA and CAP    #acute Stroke is ruled out. Patient likely has TIA. patient has hx of chronic stroke with right sided weakness.   Patient presents with increasing weakness in all 4 extremities, upper extremity weakness improved on arrival.   weakness improved. Discussed with neurologist. MRI brain can be done outpatient. Discussed with patient and daughter at bedside>> refused to get MRI inpatient. will follow up with PCP for outpatient MRI.   cont current home meds. Patient already has HHA and home PT. discussed with care manager>>> no need for F2F now.     #Acute metabolic encephalopathy with Community-acquired pneumonia (POA)  CXR shows evidence of pneumonia  - s/p iv antibiotics.    improved significantly.   prescribed oral antibiotic.     #Elevated troponin   Patient presents with elevated troponin 329.3 --> 385.6 --> 335.3. EKG shows no ST changes or signs of ischemia. Likely 2/2 demand ischemia    # Constipation. BM x 3 yesterday. improved. Prescribed lactulose at home.     Chronic medical conditions:  Chronic Atrial fibrillation: Continue eliquis, and metoprolol.    Seizure disorder: Continue lacosamide. Seizure precautions.   HTN. uncontrolled. Patient is pre-diabetic. cont current meds. MOnitor blood pressure at home and keep a log for PCP.   Moblity: Home PT. Patient and Daughter refused AAN.     Seen and examined by me today. Vitals stable.   I have discussed all the inpatient radiographic findings with the patient's daughter and stressed that patient follows with the PCP for further outpatient care. I have educated patient to use [facility] patient portal (as instructed on the discharge paperwork) to access medical records outside the hospital.   All questions welcomed and answered appropriately. She verbalized understanding of post discharge physician's follows up and discharge instructions.   DC time spent by me face to face excluding billable procedures 38 mins

## 2025-05-18 NOTE — CONSULT NOTE ADULT - ASSESSMENT
84-year-old female with PMH CVA with right-sided deficits, HTN, HLD, nonverbal at baseline, who presented for altered mental status and new-onset weakness. Patient is usually able to respond yes and no to family however today she has not been able to do this. Family states that patient had increasing weakness in all extremities (although right side is weak at baseline). Weakness of upper extremities had returned to baseline on arrival to the ED, although lower extremity weakness persisted.   CTH with chronic R posterior parietal infarct, L MCA infarct  CTA with stent in L MCA, mild L m2 stenosis , mild prox basilar narrowing, fetal pca b /l, ?stenosis of the R PICA vs artifact  CTP with perfusion deficit in area of old infarct  at baseline nonverbal , R hemiparesis     Embolic strokes  post stroke seizures  r/o new stroke vs encephalopathy vs lowered seizure threshold from PNA    - on Eliquis 5mg bid (as on 2.5 at home)  - aspirin stopped - ok if needs from cardiac perspective  - also has L MCA stent - suspect old stent - unclear where was placed  - vimpat 100mg bid for seizure   - mri brain pending to r/o new stroke - can likely defer if back to baseline and on eliquis  - cont abx for pna, avoid neurotoxic abx  - routine eeg if concern for fluctuation  - sbp normotensive  - neurochecks a4h  - check ldl and a1c  - titrate statin for ldl < 70  - pt/ot    Edie Garnett,   Vascular Neurology  Office [phone]  Available via [employer] Teams

## 2025-05-18 NOTE — DISCHARGE NOTE PROVIDER - NSDCMRMEDTOKEN_GEN_ALL_CORE_FT
amoxicillin-clavulanate 875 mg-125 mg oral tablet: 1 tab(s) orally 2 times a day  apixaban 5 mg oral tablet: 1 tab(s) orally 2 times a day  ATORVASTATIN 40MG TABLETS: 1 tab(s) orally once a day (at bedtime) TAKE 1 TABLET BY MOUTH EVERY NIGHT AT BEDTIME  enalapril 20 mg oral tablet: 1 tab(s) orally once a day  famotidine 40 mg oral tablet: 1 tab(s) orally once a day (at bedtime)  FARXIGA 10 MG TABLET: 1 tab(s) orally once a day  hydroCHLOROthiazide 25 mg oral tablet: 1 tab(s) orally once a day (in the morning)  lacosamide 100 mg oral tablet: 1 tab(s) orally 2 times a day  metoprolol tartrate 50 mg oral tablet: 1 tab(s) orally 2 times a day  MONTELUKAST SODIUM 10 MG TABLET: 1 tab(s) orally once a day  polyethylene glycol 3350 oral powder for reconstitution: 17 gram(s) orally 2 times a day  senna leaf extract oral tablet: 2 tab(s) orally once a day  TORSEMIDE 5MG TABLETS: TAKE 1 TABLET BY MOUTH 3 TIMES A WEEK

## 2025-05-18 NOTE — DISCHARGE NOTE NURSING/CASE MANAGEMENT/SOCIAL WORK - PATIENT PORTAL LINK FT
You can access the FollowMyHealth Patient Portal offered by [facility] by registering at the following website: [email or web address]. By joining [facility]’s FollowMyHealth portal, you will also be able to view your health information using other applications (apps) compatible with our system.

## 2025-05-18 NOTE — DISCHARGE NOTE PROVIDER - NSDCFUADDINST_GEN_ALL_CORE_FT
1) It is important to see your primary physician as well as other necessary consultants within next 7-10 days to perform a comprehensive medical review.  Call their offices for an appointment as soon as you leave the hospital.  If you do not have a primary physician or unable to reach your PCP, contact the [facility] Physician Referral Service at [phone].  Your medical issues appear to be stable at this time, but if your symptoms recur or worsen, contact your physicians and/or return to the hospital if necessary.  If you encounter any issues or questions with your medication, call your physicians before stopping the medication.    2) Please access [facility] Patient portal (as instructed on the discharge paperwork) to access your medical records at any time after discharge.     3) Please check your finger sticks and blood pressure at home and keep a log for PCP. Please be compliant with your meds, diet and provider follow ups. Try to walk 30 mins a day and for 5 days a week.

## 2025-05-18 NOTE — DISCHARGE NOTE NURSING/CASE MANAGEMENT/SOCIAL WORK - NSDCPEELIQUISFU_GEN_ALL_CORE
Go for blood tests as directed. Your doctor will do lab tests at regular visits to monitor the effects of this medicine. Please follow up with your doctor and keep your health care provider appointments. Female

## 2025-05-18 NOTE — DISCHARGE NOTE NURSING/CASE MANAGEMENT/SOCIAL WORK - FINANCIAL ASSISTANCE
[facility] provides services at a reduced cost to those who are determined to be eligible through [facility]’s financial assistance program. Information regarding [facility]’s financial assistance program can be found by going to [email or web address] or by calling [phone].

## 2025-05-18 NOTE — CONSULT NOTE ADULT - SUBJECTIVE AND OBJECTIVE BOX
Neurology Consult    Reason for Consult: Patient is a 84y old  Female who presents with a chief complaint of TIA, pneumonia (17 May 2025 13:19)      HPI:  84-year-old female with PMH CVA with right-sided deficits, HTN, HLD, nonverbal at baseline, who presents today for altered mental status and new-onset weakness. As per family, patient has been exhibiting altered mental status since 630 tonight. Patient is usually able to respond yes and no to family however today she has not been able to do this. Family states that patient had increasing weakness in all extremities (although right side is weak at baseline). Weakness of upper extremities had returned to baseline on arrival to the ED, although lower extremity weakness persisted.                (16 May 2025 07:28)       PAST MEDICAL & SURGICAL HISTORY:  HTN (hypertension)      High cholesterol      Seizure disorder      CVA (cerebrovascular accident)          Allergies: Allergies    No Known Drug Allergies  Milk (Unknown)  dairy products (Unknown)    Intolerances        Social History: Denies toxic habits including tobacco, ETOH or illicit drugs.    Family History: FAMILY HISTORY:  FH: HTN (hypertension)    . No family history of strokes    Medications: MEDICATIONS  (STANDING):  apixaban 5 milliGRAM(s) Oral <User Schedule>  atorvastatin 40 milliGRAM(s) Oral at bedtime  azithromycin  IVPB 500 milliGRAM(s) IV Intermittent every 24 hours  cefTRIAXone   IVPB 1000 milliGRAM(s) IV Intermittent every 24 hours  cholestyramine Powder (Sugar-Free) 4 Gram(s) Oral daily  dextrose 5%. 1000 milliLiter(s) (100 mL/Hr) IV Continuous <Continuous>  dextrose 5%. 1000 milliLiter(s) (50 mL/Hr) IV Continuous <Continuous>  dextrose 50% Injectable 25 Gram(s) IV Push once  dextrose 50% Injectable 12.5 Gram(s) IV Push once  dextrose 50% Injectable 25 Gram(s) IV Push once  glucagon  Injectable 1 milliGRAM(s) IntraMuscular once  insulin lispro (ADMELOG) corrective regimen sliding scale   SubCutaneous three times a day before meals  lacosamide 100 milliGRAM(s) Oral two times a day  LORazepam     Tablet 1 milliGRAM(s) Oral once  losartan 25 milliGRAM(s) Oral daily  metoprolol succinate ER 25 milliGRAM(s) Oral daily  montelukast 10 milliGRAM(s) Oral daily  polyethylene glycol 3350 17 Gram(s) Oral two times a day  senna 2 Tablet(s) Oral daily    MEDICATIONS  (PRN):  acetaminophen     Tablet .. 650 milliGRAM(s) Oral every 6 hours PRN Temp greater or equal to 38C (100.4F), Mild Pain (1 - 3)  aluminum hydroxide/magnesium hydroxide/simethicone Suspension 30 milliLiter(s) Oral every 4 hours PRN Dyspepsia  dextrose Oral Gel 15 Gram(s) Oral once PRN Blood Glucose LESS THAN 70 milliGRAM(s)/deciliter  melatonin 3 milliGRAM(s) Oral at bedtime PRN Insomnia  ondansetron Injectable 4 milliGRAM(s) IV Push every 8 hours PRN Nausea and/or Vomiting      Review of Systems:  CONSTITUTIONAL:  No weight loss, fever, chills, weakness or fatigue.  HEENT:  Eyes:  No visual loss, blurred vision, double vision or yellow sclera. Ears, Nose, Throat:  No hearing loss, sneezing, congestion, runny nose or sore throat.  SKIN:  No rash or itching.  CARDIOVASCULAR:  No chest pain, chest pressure or chest discomfort. No palpitations or edema.  RESPIRATORY:  No shortness of breath, cough or sputum.  GASTROINTESTINAL:  No anorexia, nausea, vomiting or diarrhea. No abdominal pain or blood.  GENITOURINARY:  No burning on urination or incontinence   NEUROLOGICAL:  No headache, dizziness, syncope, paralysis, ataxia, numbness or tingling in the extremities. No change in bowel or bladder control. no limb weakness. no vision changes.   MUSCULOSKELETAL:  No muscle, back pain, joint pain or stiffness.  HEMATOLOGIC:  No anemia, bleeding or bruising.  LYMPHATICS:  No enlarged nodes. No history of splenectomy.  PSYCHIATRIC:  No history of depression or anxiety.  ENDOCRINOLOGIC:  No reports of sweating, cold or heat intolerance. No polyuria or polydipsia.      Vitals:  Vital Signs Last 24 Hrs  T(C): 36.9 (18 May 2025 05:49), Max: 36.9 (17 May 2025 16:06)  T(F): 98.5 (18 May 2025 05:49), Max: 98.5 (17 May 2025 16:06)  HR: 98 (18 May 2025 05:49) (72 - 98)  BP: 142/89 (18 May 2025 05:49) (123/83 - 149/92)  BP(mean): --  RR: 18 (18 May 2025 05:49) (18 - 18)  SpO2: 96% (18 May 2025 05:49) (93% - 97%)    Parameters below as of 18 May 2025 05:49  Patient On (Oxygen Delivery Method): room air        General Exam:   General Appearance: Appropriately dressed and in no acute distress       Head: Normocephalic, atraumatic and no dysmorphic features  Ear, Nose, and Throat: Moist mucous membranes  CVS: S1S2+  Resp: No SOB, no wheeze or rhonchi  GI: soft NT/ND  Extremities: No edema or cyanosis  Skin: No bruises or rashes     Neurological Exam:  Mental Status: Awake, alert and oriented x 3.  Able to follow simple and complex verbal commands. Able to name and repeat. fluent speech. No obvious aphasia or dysarthria noted.   Cranial Nerves: PERRL, EOMI, VFFC, sensation V1-V3 intact,  no obvious facial asymmetry, equal elevation of palate, scm/trap 5/5, tongue is midline on protrusion. hearing is grossly intact.   Motor: Normal bulk, tone and strength throughout. Fine finger movements were intact and symmetric. no tremors or drift noted.    Sensation: Intact to light touch and pinprick throughout. no right/left confusion. no extinction to tactile on DSS.   Reflexes: 1+ throughout at biceps, brachioradialis, triceps, patellars and ankles bilaterally and equal. No clonus. R toe and L toe were both downgoing.  Coordination: No dysmetria on FNF or HKS  Gait: deferred    Data/Labs/Imaging which I personally reviewed.     Labs:     CBC Full  -  ( 16 May 2025 07:59 )  WBC Count : 9.56 K/uL  RBC Count : 4.86 M/uL  Hemoglobin : 14.1 g/dL  Hematocrit : 43.6 %  Platelet Count - Automated : 200 K/uL  Mean Cell Volume : 89.7 fl  Mean Cell Hemoglobin : 29.0 pg  Mean Cell Hemoglobin Concentration : 32.3 g/dL  Auto Neutrophil # : x  Auto Lymphocyte # : x  Auto Monocyte # : x  Auto Eosinophil # : x  Auto Basophil # : x  Auto Neutrophil % : x  Auto Lymphocyte % : x  Auto Monocyte % : x  Auto Eosinophil % : x  Auto Basophil % : x    c< from: CT Brain Perfusion Maps Stroke (05.15.25 @ 21:19) >    ACC: [id number] EXAM:  CT ANGIO NECK STROKE PROTCL IC   ORDERED BY: DANN VELEZ     ACC: [id number] EXAM:  CT BRAIN PERFUSION MAPS STROKE   ORDERED BY: DANN VELEZ     ACC: [id number] EXAM:  CT ANGIO BRAIN STROKE PROTC IC   ORDERED BY: DANN VELEZ     PROCEDURE DATE:  05/15/2025          INTERPRETATION:  CLINICAL INFORMATION: Stroke Code    COMPARISON: None.    CONTRAST:  IV Contrast: Omnipaque 350 (accession [id number]), Omnipaque 350 (accession   [id number]), IV contrast documented in unlinked concurrent exam (accession   [id number])  96 cc administered (accession [id number]), 45 cc administered   (accession [id number]), 96 cc administered (accession [id number])  04 cc   discarded (accession [id number]), 55 cc discarded (accession [id number]), 04   cc discarded (accession [id number])  .    TECHNIQUE: CT perfusion and CTA of the head and neck were performed   following the intravenous administration of IV contrast. MIP   reconstructions were performed on a separate workstation and reviewed.   RAPID software was utilized for perfusion analysis.    FINDINGS:    CT PERFUSION:    TECHNICAL LIMITATIONS: None.    CBF<30%/CORE INFARCTION: 5 mL corresponding to chronic infarct in the   right parietal lobe.  TMAX>6s/UNDERPERFUSED TISSUE: 20 mL  MISMATCH VOLUME/TISSUE AT RISK: 15 mL  MISMATCH RATIO: 4.0    MISCELLANEOUS: None.      CTA NECK:    AORTIC ARCH AND VISUALIZED GREAT VESSELS: Atherosclerotic changes. No   significant stenosis.    RIGHT:  COMMON CAROTID ARTERY: No significant stenosis to the carotid bifurcation.  INTERNAL CAROTID ARTERY: No significant stenosis based on NASCET criteria.  VERTEBRAL ARTERY: Normal in course and caliber to the intracranial   circulation.    LEFT:  COMMON CAROTID ARTERY: No significant stenosis to the carotid bifurcation.  INTERNAL CAROTID ARTERY: No significant stenosis based on NASCET criteria.  VERTEBRAL ARTERY: Normal in course and caliber to the intracranial   circulation. Left dominant vertebral system.    VISUALIZED LUNGS: Clear.    MISCELLANEOUS: Multilevel degenerative changes in the spine.    CAROTID STENOSIS REFERENCE: Percent (%) stenosis is expressed in terms of   NASCET Criteria. (NASCET = 100x1-(N/D)). N=greatest narrowing. D=normal   distal diameter - MILD = <50% stenosis. - MODERATE = 50-69% stenosis. -   SEVERE = 70-89% stenosis. - HAIRLINE/CRITICAL = 90-99% stenosis. -   OCCLUDED = 100% stenosis.      CTA HEAD:    INTERNAL CAROTID ARTERIES: Bilateral petrous, precavernous, cavernous,   and supraclinoid regions are patent without significant stenosis.   Atherosclerotic calcifications of the bilateral carotid siphons.    [place]: No aneurysm identified. Tiny aneurysms can be beyond   the resolution of CTA technique.    ANTERIOR CEREBRAL ARTERIES: No significant stenosis or occlusion.  MIDDLE CEREBRAL ARTERIES: Limited evaluation of the left MCA at the   proximal and distal ends of the stent due to streak artifact. Otherwise,   stented portion of the left MCA is grossly patent. The right MCA is   patent without high-grade stenosis or occlusion. Mild right M2 origin   stenoses.  POSTERIOR CEREBRAL ARTERIES: No significant stenosis or occlusion. Fetal   origins of the bilateral posterior cerebral arteries.    DISTAL VERTEBRAL / BASILAR ARTERIES: Loss of normal opacification within   the right vertebral artery beyond the PICA origin may be artifactual due   to high-grade stenosis. No other significant stenosis or occlusion. Mild   narrowing at the proximal basilar artery. Hypoplastic right vertebral   artery.    VENOUS STRUCTURES: Visualized superficial and deep venous structures   appear patent.    MISCELLANEOUS: No other vascular abnormality is seen.      IMPRESSION:    CT PERFUSION:  Technical limitations: None.    Core infarction: 5 mL, corresponding to chronic right parietal infarct.  Penumbra / tissue at risk for active ischemia: 15 mL, large corresponding   to chronic infarct in the right parietal lobe.    CTA NECK:  No evidence of significant stenosis or occlusion.    CTA HEAD:  No large vessel occlusion. Limited evaluation of the left MCA at the   proximal and distal ends of the stent.        --- End of Report ---            FABIAN GAMBOA MD; Attending Radiologist  This document has been electronically signed. May 15 2025  9:42PM    < end of copied text >                   Neurology Consult    Reason for Consult: Patient is a 84y old  Female who presents with a chief complaint of TIA, pneumonia (17 May 2025 13:19)      HPI:  84-year-old female with PMH CVA with right-sided deficits, HTN, HLD, nonverbal at baseline, who presents today for altered mental status and new-onset weakness. As per family, patient has been exhibiting altered mental status since 630 tonight. Patient is usually able to respond yes and no to family however today she has not been able to do this. Family states that patient had increasing weakness in all extremities (although right side is weak at baseline). Weakness of upper extremities had returned to baseline on arrival to the ED, although lower extremity weakness persisted.                (16 May 2025 07:28)       PAST MEDICAL & SURGICAL HISTORY:  HTN (hypertension)      High cholesterol      Seizure disorder      CVA (cerebrovascular accident)          Allergies: Allergies    No Known Drug Allergies  Milk (Unknown)  dairy products (Unknown)    Intolerances        Social History: Denies toxic habits including tobacco, ETOH or illicit drugs.    Family History: FAMILY HISTORY:  FH: HTN (hypertension)    . No family history of strokes    Medications: MEDICATIONS  (STANDING):  apixaban 5 milliGRAM(s) Oral <User Schedule>  atorvastatin 40 milliGRAM(s) Oral at bedtime  azithromycin  IVPB 500 milliGRAM(s) IV Intermittent every 24 hours  cefTRIAXone   IVPB 1000 milliGRAM(s) IV Intermittent every 24 hours  cholestyramine Powder (Sugar-Free) 4 Gram(s) Oral daily  dextrose 5%. 1000 milliLiter(s) (100 mL/Hr) IV Continuous <Continuous>  dextrose 5%. 1000 milliLiter(s) (50 mL/Hr) IV Continuous <Continuous>  dextrose 50% Injectable 25 Gram(s) IV Push once  dextrose 50% Injectable 12.5 Gram(s) IV Push once  dextrose 50% Injectable 25 Gram(s) IV Push once  glucagon  Injectable 1 milliGRAM(s) IntraMuscular once  insulin lispro (ADMELOG) corrective regimen sliding scale   SubCutaneous three times a day before meals  lacosamide 100 milliGRAM(s) Oral two times a day  LORazepam     Tablet 1 milliGRAM(s) Oral once  losartan 25 milliGRAM(s) Oral daily  metoprolol succinate ER 25 milliGRAM(s) Oral daily  montelukast 10 milliGRAM(s) Oral daily  polyethylene glycol 3350 17 Gram(s) Oral two times a day  senna 2 Tablet(s) Oral daily    MEDICATIONS  (PRN):  acetaminophen     Tablet .. 650 milliGRAM(s) Oral every 6 hours PRN Temp greater or equal to 38C (100.4F), Mild Pain (1 - 3)  aluminum hydroxide/magnesium hydroxide/simethicone Suspension 30 milliLiter(s) Oral every 4 hours PRN Dyspepsia  dextrose Oral Gel 15 Gram(s) Oral once PRN Blood Glucose LESS THAN 70 milliGRAM(s)/deciliter  melatonin 3 milliGRAM(s) Oral at bedtime PRN Insomnia  ondansetron Injectable 4 milliGRAM(s) IV Push every 8 hours PRN Nausea and/or Vomiting      Review of Systems:  CONSTITUTIONAL:  No weight loss, fever, chills, weakness or fatigue.  HEENT:  Eyes:  No visual loss, blurred vision, double vision or yellow sclera. Ears, Nose, Throat:  No hearing loss, sneezing, congestion, runny nose or sore throat.  SKIN:  No rash or itching.  CARDIOVASCULAR:  No chest pain, chest pressure or chest discomfort. No palpitations or edema.  RESPIRATORY:  No shortness of breath, cough or sputum.  GASTROINTESTINAL:  No anorexia, nausea, vomiting or diarrhea. No abdominal pain or blood.  GENITOURINARY:  No burning on urination or incontinence   NEUROLOGICAL:  No headache, dizziness, syncope, paralysis, ataxia, numbness or tingling in the extremities. No change in bowel or bladder control. no limb weakness. no vision changes.   MUSCULOSKELETAL:  No muscle, back pain, joint pain or stiffness.  HEMATOLOGIC:  No anemia, bleeding or bruising.  LYMPHATICS:  No enlarged nodes. No history of splenectomy.  PSYCHIATRIC:  No history of depression or anxiety.  ENDOCRINOLOGIC:  No reports of sweating, cold or heat intolerance. No polyuria or polydipsia.      Vitals:  Vital Signs Last 24 Hrs  T(C): 36.9 (18 May 2025 05:49), Max: 36.9 (17 May 2025 16:06)  T(F): 98.5 (18 May 2025 05:49), Max: 98.5 (17 May 2025 16:06)  HR: 98 (18 May 2025 05:49) (72 - 98)  BP: 142/89 (18 May 2025 05:49) (123/83 - 149/92)  BP(mean): --  RR: 18 (18 May 2025 05:49) (18 - 18)  SpO2: 96% (18 May 2025 05:49) (93% - 97%)    Parameters below as of 18 May 2025 05:49  Patient On (Oxygen Delivery Method): room air        General Exam:   General Appearance: Appropriately dressed and in no acute distress       Head: Normocephalic, atraumatic and no dysmorphic features  Ear, Nose, and Throat: Moist mucous membranes  CVS: S1S2+  Resp: No SOB, no wheeze or rhonchi  GI: soft NT/ND  Extremities: No edema or cyanosis  Skin: No bruises or rashes     Neurological Exam:  Mental Status: Awake, alert, nods but no verbal output other than nonintelligeable muttering, follows some simple commands  Cranial Nerves: PERRL, EOMI, BTT bilaterally,  sensation V1-V3 intact,  R facial asymmetry, equal elevation of palate, scm/trap 5/5, tongue is midline on protrusion. hearing is grossly intact.   Motor: RUE drift, lowers briefly AG, LUE no drift   Sensation: Intact to light touch and pinprick throughout. no right/left confusion. no extinction to tactile on DSS.   Reflexes: 1+ throughout at biceps, brachioradialis, triceps, patellars and ankles bilaterally and equal. No clonus. R toe and L toe were both downgoing.  Coordination: unable  Gait: deferred    Data/Labs/Imaging which I personally reviewed.     Labs:     CBC Full  -  ( 16 May 2025 07:59 )  WBC Count : 9.56 K/uL  RBC Count : 4.86 M/uL  Hemoglobin : 14.1 g/dL  Hematocrit : 43.6 %  Platelet Count - Automated : 200 K/uL  Mean Cell Volume : 89.7 fl  Mean Cell Hemoglobin : 29.0 pg  Mean Cell Hemoglobin Concentration : 32.3 g/dL  Auto Neutrophil # : x  Auto Lymphocyte # : x  Auto Monocyte # : x  Auto Eosinophil # : x  Auto Basophil # : x  Auto Neutrophil % : x  Auto Lymphocyte % : x  Auto Monocyte % : x  Auto Eosinophil % : x  Auto Basophil % : x    c< from: CT Brain Perfusion Maps Stroke (05.15.25 @ 21:19) >    ACC: [id number] EXAM:  CT ANGIO NECK STROKE PROTCL IC   ORDERED BY: DANN VELEZ     ACC: [id number] EXAM:  CT BRAIN PERFUSION MAPS STROKE   ORDERED BY: DANN VELEZ     ACC: [id number] EXAM:  CT ANGIO BRAIN STROKE [facility] IC   ORDERED BY: DANN VELEZ     PROCEDURE DATE:  05/15/2025          INTERPRETATION:  CLINICAL INFORMATION: Stroke Code    COMPARISON: None.    CONTRAST:  IV Contrast: Omnipaque 350 (accession [id number]), Omnipaque 350 (accession   [id number]), IV contrast documented in unlinked concurrent exam (accession   [id number])  96 cc administered (accession [id number]), 45 cc administered   (accession [id number]), 96 cc administered (accession [id number])  04 cc   discarded (accession [id number]), 55 cc discarded (accession [id number]), 04   cc discarded (accession [id number])  .    TECHNIQUE: CT perfusion and CTA of the head and neck were performed   following the intravenous administration of IV contrast. MIP   reconstructions were performed on a separate workstation and reviewed.   RAPID software was utilized for perfusion analysis.    FINDINGS:    CT PERFUSION:    TECHNICAL LIMITATIONS: None.    CBF<30%/CORE INFARCTION: 5 mL corresponding to chronic infarct in the   right parietal lobe.  TMAX>6s/UNDERPERFUSED TISSUE: 20 mL  MISMATCH VOLUME/TISSUE AT RISK: 15 mL  MISMATCH RATIO: 4.0    MISCELLANEOUS: None.      CTA NECK:    AORTIC ARCH AND VISUALIZED GREAT VESSELS: Atherosclerotic changes. No   significant stenosis.    RIGHT:  COMMON CAROTID ARTERY: No significant stenosis to the carotid bifurcation.  INTERNAL CAROTID ARTERY: No significant stenosis based on NASCET criteria.  VERTEBRAL ARTERY: Normal in course and caliber to the intracranial   circulation.    LEFT:  COMMON CAROTID ARTERY: No significant stenosis to the carotid bifurcation.  INTERNAL CAROTID ARTERY: No significant stenosis based on NASCET criteria.  VERTEBRAL ARTERY: Normal in course and caliber to the intracranial   circulation. Left dominant vertebral system.    VISUALIZED LUNGS: Clear.    MISCELLANEOUS: Multilevel degenerative changes in the spine.    CAROTID STENOSIS REFERENCE: Percent (%) stenosis is expressed in terms of   NASCET Criteria. (NASCET = 100x1-(N/D)). N=greatest narrowing. D=normal   distal diameter - MILD = <50% stenosis. - MODERATE = 50-69% stenosis. -   SEVERE = 70-89% stenosis. - HAIRLINE/CRITICAL = 90-99% stenosis. -   OCCLUDED = 100% stenosis.      CTA HEAD:    INTERNAL CAROTID ARTERIES: Bilateral petrous, precavernous, cavernous,   and supraclinoid regions are patent without significant stenosis.   Atherosclerotic calcifications of the bilateral carotid siphons.    [place]: No aneurysm identified. Tiny aneurysms can be beyond   the resolution of CTA technique.    ANTERIOR CEREBRAL ARTERIES: No significant stenosis or occlusion.  MIDDLE CEREBRAL ARTERIES: Limited evaluation of the left MCA at the   proximal and distal ends of the stent due to streak artifact. Otherwise,   stented portion of the left MCA is grossly patent. The right MCA is   patent without high-grade stenosis or occlusion. Mild right M2 origin   stenoses.  POSTERIOR CEREBRAL ARTERIES: No significant stenosis or occlusion. Fetal   origins of the bilateral posterior cerebral arteries.    DISTAL VERTEBRAL / BASILAR ARTERIES: Loss of normal opacification within   the right vertebral artery beyond the PICA origin may be artifactual due   to high-grade stenosis. No other significant stenosis or occlusion. Mild   narrowing at the proximal basilar artery. Hypoplastic right vertebral   artery.    VENOUS STRUCTURES: Visualized superficial and deep venous structures   appear patent.    MISCELLANEOUS: No other vascular abnormality is seen.      IMPRESSION:    CT PERFUSION:  Technical limitations: None.    Core infarction: 5 mL, corresponding to chronic right parietal infarct.  Penumbra / tissue at risk for active ischemia: 15 mL, large corresponding   to chronic infarct in the right parietal lobe.    CTA NECK:  No evidence of significant stenosis or occlusion.    CTA HEAD:  No large vessel occlusion. Limited evaluation of the left MCA at the   proximal and distal ends of the stent.        --- End of Report ---            FABIAN GAMBOA MD; Attending Radiologist  This document has been electronically signed. May 15 2025  9:42PM    < end of copied text >

## 2025-05-22 DIAGNOSIS — I63.9 CEREBRAL INFARCTION, UNSPECIFIED: ICD-10-CM

## 2025-05-22 DIAGNOSIS — R79.89 OTHER SPECIFIED ABNORMAL FINDINGS OF BLOOD CHEMISTRY: ICD-10-CM

## 2025-05-22 DIAGNOSIS — J18.9 PNEUMONIA, UNSPECIFIED ORGANISM: ICD-10-CM

## 2025-05-23 DIAGNOSIS — Z79.82 LONG TERM (CURRENT) USE OF ASPIRIN: ICD-10-CM

## 2025-05-23 DIAGNOSIS — I48.20 CHRONIC ATRIAL FIBRILLATION, UNSPECIFIED: ICD-10-CM

## 2025-05-23 DIAGNOSIS — J18.9 PNEUMONIA, UNSPECIFIED ORGANISM: ICD-10-CM

## 2025-05-23 DIAGNOSIS — R41.82 ALTERED MENTAL STATUS, UNSPECIFIED: ICD-10-CM

## 2025-05-23 DIAGNOSIS — G40.909 EPILEPSY, UNSPECIFIED, NOT INTRACTABLE, WITHOUT STATUS EPILEPTICUS: ICD-10-CM

## 2025-05-23 DIAGNOSIS — G92.8 OTHER TOXIC ENCEPHALOPATHY: ICD-10-CM

## 2025-05-23 DIAGNOSIS — E78.5 HYPERLIPIDEMIA, UNSPECIFIED: ICD-10-CM

## 2025-05-23 DIAGNOSIS — K59.00 CONSTIPATION, UNSPECIFIED: ICD-10-CM

## 2025-05-23 DIAGNOSIS — I10 ESSENTIAL (PRIMARY) HYPERTENSION: ICD-10-CM

## 2025-05-23 DIAGNOSIS — I69.351 HEMIPLEGIA AND HEMIPARESIS FOLLOWING CEREBRAL INFARCTION AFFECTING RIGHT DOMINANT SIDE: ICD-10-CM

## 2025-05-23 DIAGNOSIS — I24.89 OTHER FORMS OF ACUTE ISCHEMIC HEART DISEASE: ICD-10-CM

## 2025-05-23 DIAGNOSIS — Z79.01 LONG TERM (CURRENT) USE OF ANTICOAGULANTS: ICD-10-CM

## 2025-06-04 ENCOUNTER — INPATIENT (INPATIENT)
Facility: HOSPITAL | Age: 85
LOS: 6 days | Discharge: HOME | End: 2025-06-11
Attending: STUDENT IN AN ORGANIZED HEALTH CARE EDUCATION/TRAINING PROGRAM | Admitting: STUDENT IN AN ORGANIZED HEALTH CARE EDUCATION/TRAINING PROGRAM
Payer: MEDICARE

## 2025-06-04 VITALS
SYSTOLIC BLOOD PRESSURE: 112 MMHG | RESPIRATION RATE: 18 BRPM | TEMPERATURE: 98 F | OXYGEN SATURATION: 99 % | DIASTOLIC BLOOD PRESSURE: 64 MMHG | HEART RATE: 64 BPM

## 2025-06-04 LAB
ADD ON TEST-SPECIMEN IN LAB: SIGNIFICANT CHANGE UP
ALBUMIN SERPL ELPH-MCNC: 3.5 G/DL — SIGNIFICANT CHANGE UP (ref 3.3–5)
ALP SERPL-CCNC: 120 U/L — SIGNIFICANT CHANGE UP (ref 40–120)
ALT FLD-CCNC: 12 U/L — SIGNIFICANT CHANGE UP (ref 4–33)
ANION GAP SERPL CALC-SCNC: 10 MMOL/L — SIGNIFICANT CHANGE UP (ref 7–14)
APTT BLD: 30 SEC — SIGNIFICANT CHANGE UP (ref 26.1–36.8)
AST SERPL-CCNC: 17 U/L — SIGNIFICANT CHANGE UP (ref 4–32)
BASOPHILS # BLD AUTO: 0.02 K/UL — SIGNIFICANT CHANGE UP (ref 0–0.2)
BASOPHILS NFR BLD AUTO: 0.2 % — SIGNIFICANT CHANGE UP (ref 0–2)
BILIRUB SERPL-MCNC: 0.4 MG/DL — SIGNIFICANT CHANGE UP (ref 0.2–1.2)
BUN SERPL-MCNC: 18 MG/DL — SIGNIFICANT CHANGE UP (ref 7–23)
CALCIUM SERPL-MCNC: 10 MG/DL — SIGNIFICANT CHANGE UP (ref 8.4–10.5)
CHLORIDE SERPL-SCNC: 103 MMOL/L — SIGNIFICANT CHANGE UP (ref 98–107)
CO2 SERPL-SCNC: 29 MMOL/L — SIGNIFICANT CHANGE UP (ref 22–31)
CREAT SERPL-MCNC: 1.47 MG/DL — HIGH (ref 0.5–1.3)
EGFR: 35 ML/MIN/1.73M2 — LOW
EGFR: 35 ML/MIN/1.73M2 — LOW
EOSINOPHIL # BLD AUTO: 0.23 K/UL — SIGNIFICANT CHANGE UP (ref 0–0.5)
EOSINOPHIL NFR BLD AUTO: 2.5 % — SIGNIFICANT CHANGE UP (ref 0–6)
GLUCOSE SERPL-MCNC: 155 MG/DL — HIGH (ref 70–99)
HCT VFR BLD CALC: 42 % — SIGNIFICANT CHANGE UP (ref 34.5–45)
HGB BLD-MCNC: 13.2 G/DL — SIGNIFICANT CHANGE UP (ref 11.5–15.5)
IANC: 6.76 K/UL — SIGNIFICANT CHANGE UP (ref 1.8–7.4)
IMM GRANULOCYTES NFR BLD AUTO: 0.4 % — SIGNIFICANT CHANGE UP (ref 0–0.9)
INR BLD: 1.42 RATIO — HIGH (ref 0.85–1.16)
LYMPHOCYTES # BLD AUTO: 1.52 K/UL — SIGNIFICANT CHANGE UP (ref 1–3.3)
LYMPHOCYTES # BLD AUTO: 16.5 % — SIGNIFICANT CHANGE UP (ref 13–44)
MCHC RBC-ENTMCNC: 28.6 PG — SIGNIFICANT CHANGE UP (ref 27–34)
MCHC RBC-ENTMCNC: 31.4 G/DL — LOW (ref 32–36)
MCV RBC AUTO: 91.1 FL — SIGNIFICANT CHANGE UP (ref 80–100)
MONOCYTES # BLD AUTO: 0.64 K/UL — SIGNIFICANT CHANGE UP (ref 0–0.9)
MONOCYTES NFR BLD AUTO: 6.9 % — SIGNIFICANT CHANGE UP (ref 2–14)
NEUTROPHILS # BLD AUTO: 6.76 K/UL — SIGNIFICANT CHANGE UP (ref 1.8–7.4)
NEUTROPHILS NFR BLD AUTO: 73.5 % — SIGNIFICANT CHANGE UP (ref 43–77)
NRBC # BLD AUTO: 0 K/UL — SIGNIFICANT CHANGE UP (ref 0–0)
NRBC # FLD: 0 K/UL — SIGNIFICANT CHANGE UP (ref 0–0)
NRBC BLD AUTO-RTO: 0 /100 WBCS — SIGNIFICANT CHANGE UP (ref 0–0)
PLATELET # BLD AUTO: 182 K/UL — SIGNIFICANT CHANGE UP (ref 150–400)
POTASSIUM SERPL-MCNC: 3.4 MMOL/L — LOW (ref 3.5–5.3)
POTASSIUM SERPL-SCNC: 3.4 MMOL/L — LOW (ref 3.5–5.3)
PROT SERPL-MCNC: 7.2 G/DL — SIGNIFICANT CHANGE UP (ref 6–8.3)
PROTHROM AB SERPL-ACNC: 16.8 SEC — HIGH (ref 9.9–13.4)
RBC # BLD: 4.61 M/UL — SIGNIFICANT CHANGE UP (ref 3.8–5.2)
RBC # FLD: 14.2 % — SIGNIFICANT CHANGE UP (ref 10.3–14.5)
SODIUM SERPL-SCNC: 142 MMOL/L — SIGNIFICANT CHANGE UP (ref 135–145)
WBC # BLD: 9.21 K/UL — SIGNIFICANT CHANGE UP (ref 3.8–10.5)
WBC # FLD AUTO: 9.21 K/UL — SIGNIFICANT CHANGE UP (ref 3.8–10.5)

## 2025-06-04 PROCEDURE — 99291 CRITICAL CARE FIRST HOUR: CPT

## 2025-06-04 RX ADMIN — Medication 80 MILLIGRAM(S): at 23:14

## 2025-06-04 RX ADMIN — Medication 1000 MILLILITER(S): at 23:14

## 2025-06-04 NOTE — ED PROVIDER NOTE - ATTENDING CONTRIBUTION TO CARE
I have personally provided the amount of critical care time documented below, excluding time spent on separate procedures: GI bleed requiring blood transfusion.    I have personally performed a face to face medical and diagnostic evaluation of the patient. I have discussed with and reviewed the ACP's note and agree with the History, ROS, Physical Exam and MDM unless otherwise indicated. A brief summary of my personal evaluation and impression can be found below.    84-year-old female past medical history of prior strokes, currently on anticoagulation presenting for an episode of syncope while having an active GI bleed earlier today.   daughter at bedside states that did not fall no head injury, no trauma.  Family members noticed multiple clots  per rectum. On exam, vital signs stable, patient alert and responsive, at baseline mental status. [Nonverbal at baseline] no reproducible abdominal tenderness to palpation, no focal findings.  Multiple clots noticed per rectum.  Per EMS, patient  was hypotensive, normotensive here.  Will likely benefit from blood transfusion given relative hypotension in the setting of past medical history of hypertension.  Plan for labs, CT angio of the abdomen pelvis.  Anticipate admission, will reassess to dispo. Dayan Carr, ED Attending

## 2025-06-04 NOTE — ED PROVIDER NOTE - NS ED ATTENDING STATEMENT MOD
This was a shared visit with the ROSEY. I reviewed and verified the documentation. I have personally provided the amount of critical care time documented below concurrently with the resident/fellow.  This time excludes time spent on separate procedures and time spent teaching. I have reviewed the resident’s / fellow’s documentation and I agree with the history, exam, and assessment and plan of care.

## 2025-06-04 NOTE — ED PROVIDER NOTE - OBJECTIVE STATEMENT
85 y/o female with pmhx of CVA (nonverbal), afib on Eliquis, pneumonia 3 weeks ago admitted at [facility], coming from home, presents to ED for syncope and GI bleed. Daughters take care of pt at home. Witnessed syncope by daughters today. Pt bedbound. No fall or head trauma. Pt had large dark red blood clot rectal bleeding and family called EMS. Per EMS, blood pressure 60/40s in field. Had episode of vomiting. No fever, chills, diarrhea. Hx provided by son and granddaughter at bedside.

## 2025-06-04 NOTE — ED PROVIDER NOTE - ATTENDING APP SHARED VISIT CONTRIBUTION OF CARE
I have personally performed a face to face medical and diagnostic evaluation of the patient. I have discussed with and reviewed the Resident's note and agree with the History, ROS, Physical Exam and MDM unless otherwise indicated. A brief summary of my personal evaluation and impression can be found below.    84-year-old female past medical history of prior strokes, currently on anticoagulation presenting for an episode of syncope while having an active GI bleed earlier today.   daughter at bedside states that did not fall no head injury, no trauma.  Family members noticed multiple clots  per rectum. On exam, vital signs stable, patient alert and responsive, at baseline mental status. [Nonverbal at baseline] no reproducible abdominal tenderness to palpation, no focal findings.  Multiple clots noticed per rectum.  Per EMS, patient  was hypotensive, normotensive here.  Will likely benefit from blood transfusion given relative hypotension in the setting of past medical history of hypertension.  Plan for labs, CT angio of the abdomen pelvis.  Anticipate admission, will reassess to dispo. Dayan Carr, ED Attending

## 2025-06-04 NOTE — ED PROVIDER NOTE - PROGRESS NOTE DETAILS
HERNANDEZ Pleitezu- no significant bloody BMs since arrival. Blood pressures have been stable. pt became agitated and given sedation for CT. pt removed her own IV. pt calm and cooperative now. no active bleed on CTA. Pt admitted to medicine. GI consulted.

## 2025-06-04 NOTE — CHART NOTE - NSCHARTNOTEFT_GEN_A_CORE
Paged by ED for rectal bleeding.     Per chart review, briefly, this is a 83 y/o female with h/o CVA (nonverbal & bedbound), afib on Eliquis, recent admission at [facility] for pneumonia 3 weeks ago (different chart with MRN [id number]), brought in by EMS for syncope and GI bleed.   Per ED note: Patient had witnessed syncope (no trauma) by daughters today, associated with large dark red blood clot via rectum, which prompted the EMS call. Per EMS, blood pressure was 60/40s in field, and patient had an episode of vomiting. No reported fever, chills, diarrhea per son and granddaughter at bedside.    BP improved upon arrival to ED (60's/40's in the field -->112/64 upon ED arrival --> 99/56) with HR in 60's (discharged on metoprolol). Hgb resulted 13.2 (hgb 14.1 5/16/2025).   S/p PPI IV 80 x 1.   Pending CTA for bleeding protocol.     Prelim rec:  - Keep NPO for now.  - F/u CT A/P for source localization.   - Given hgb stability and hemodynamic improvement without intervention, lower suspicion for hemodynamically impacting brisk UGIB at this time.   - May start empirical PPI drip while observing for clinical progression  - Trend CBC and monitor for bleeding episodes  - If patient deteriorates hemodynamically, having hematemesis or other evidence of brisk UGIB, please page on call GI to consider urgent endoscopic evaluation and will need ICU admission if requiring urgent EGD. Otherwise full consult to follow in AM by day team.           Discharge med rec per [facility] admission.    amoxicillin-clavulanate 875 mg-125 mg oral tablet 1 tab(s) orally 2 times a day ; Active    apixaban 5 mg oral tablet 1 tab(s) orally 2 times a day ; Active    ATORVASTATIN 40MG TABLETS 1 tab(s) orally once a day (at bedtime) TAKE 1 TABLET BY MOUTH EVERY NIGHT AT BEDTIME ; Active    enalapril 20 mg oral tablet 1 tab(s) orally once a day ; Active    famotidine 40 mg oral tablet 1 tab(s) orally once a day (at bedtime) ; Active    FARXIGA 10 MG TABLET 1 tab(s) orally once a day ; Active    hydroCHLOROthiazide 25 mg oral tablet 1 tab(s) orally once a day (in the morning) ; Active    lacosamide 100 mg oral tablet 1 tab(s) orally 2 times a day ; Active    metoprolol tartrate 50 mg oral tablet 1 tab(s) orally 2 times a day ; Active    MONTELUKAST SODIUM 10 MG TABLET 1 tab(s) orally once a day ; Active    polyethylene glycol 3350 oral powder for reconstitution 17 gram(s) orally 2 times a day ; Active    senna leaf extract oral tablet 2 tab(s) orally once a day ; Active    TORSEMIDE 5MG TABLETS TAKE 1 TABLET BY MOUTH 3 TIMES A WEEK ; Active Paged by ED for rectal bleeding.     Per chart review, briefly, this is a 83 y/o female with h/o CVA (nonverbal & bedbound), afib on Eliquis, recent admission at [facility] for pneumonia 3 weeks ago (different chart with MRN [id number]), brought in by EMS for syncope and GI bleed.   Per ED note: Patient had witnessed syncope (no trauma) by daughters today, associated with large dark red blood clot via rectum, which prompted the EMS call. Per EMS, blood pressure was 60/40s in field, and patient had an episode of vomiting. No reported fever, chills, diarrhea per son and granddaughter at bedside.    BP improved upon arrival to ED (60's/40's in the field -->112/64 upon ED arrival --> 99/56) with HR in 60's (discharged on metoprolol). Hgb resulted 13.2 (hgb 14.1 5/16/2025).   S/p PPI IV 80 x 1.   Pending CTA for bleeding protocol.     Prelim rec:  - Keep NPO for now.  - F/u CT A/P for source localization.   - Given hgb stability and hemodynamic improvement without intervention, lower suspicion for hemodynamically impacting brisk UGIB at this time.   - May start empirical PPI drip while observing for clinical progression  - Trend CBC and monitor for bleeding episodes  - Hold Eliquis and anti-hypertensives in the setting of GIB and soft BP if clinically permitting  - Hold Farxiga in case needs endoscopic evaluation  - If patient deteriorates hemodynamically, having hematemesis or other evidence of brisk UGIB, please page on call GI to consider urgent endoscopic evaluation and will need ICU admission if requiring urgent EGD. Otherwise full consult to follow in AM by day team.           Discharge med rec per [facility] admission.    amoxicillin-clavulanate 875 mg-125 mg oral tablet 1 tab(s) orally 2 times a day ; Active    apixaban 5 mg oral tablet 1 tab(s) orally 2 times a day ; Active    ATORVASTATIN 40MG TABLETS 1 tab(s) orally once a day (at bedtime) TAKE 1 TABLET BY MOUTH EVERY NIGHT AT BEDTIME ; Active    enalapril 20 mg oral tablet 1 tab(s) orally once a day ; Active    famotidine 40 mg oral tablet 1 tab(s) orally once a day (at bedtime) ; Active    FARXIGA 10 MG TABLET 1 tab(s) orally once a day ; Active    hydroCHLOROthiazide 25 mg oral tablet 1 tab(s) orally once a day (in the morning) ; Active    lacosamide 100 mg oral tablet 1 tab(s) orally 2 times a day ; Active    metoprolol tartrate 50 mg oral tablet 1 tab(s) orally 2 times a day ; Active    MONTELUKAST SODIUM 10 MG TABLET 1 tab(s) orally once a day ; Active    polyethylene glycol 3350 oral powder for reconstitution 17 gram(s) orally 2 times a day ; Active    senna leaf extract oral tablet 2 tab(s) orally once a day ; Active    TORSEMIDE 5MG TABLETS TAKE 1 TABLET BY MOUTH 3 TIMES A WEEK ; Active

## 2025-06-04 NOTE — ED PROVIDER NOTE - CLINICAL SUMMARY MEDICAL DECISION MAKING FREE TEXT BOX
85 y/o female with pmhx of CVA (nonverbal), afib on Eliquis, pneumonia 3 weeks ago admitted at [facility], coming from home, presents to ED for syncope and GI bleed. Daughters take care of pt at home. Witnessed syncope by daughters today. Pt bedbound. No fall or head trauma. Pt had large dark red blood clot rectal bleeding and family called EMS. Per EMS, blood pressure 60/40s in field. Had episode of vomiting. pt nonverbal awake vitals stable, significant large blood clots on exam. ekg in afib rate controlled. plan to place 2nd IV line, check labs, type and screen, provide IV protonix fluids for GI bleed, consider blood transfusion and Eliquis reversal agent. GI consult.

## 2025-06-04 NOTE — ED ADULT TRIAGE NOTE - CHIEF COMPLAINT QUOTE
Pt sent for syncopal episode while having bowel movement. Per family had BRBPR. No fall or trauma. BP 60's/40's on scene. 300ml NS by EMS. Hx: Afib, CVA(nonverbal).

## 2025-06-05 DIAGNOSIS — K92.2 GASTROINTESTINAL HEMORRHAGE, UNSPECIFIED: ICD-10-CM

## 2025-06-05 DIAGNOSIS — I48.20 CHRONIC ATRIAL FIBRILLATION, UNSPECIFIED: ICD-10-CM

## 2025-06-05 DIAGNOSIS — Z29.9 ENCOUNTER FOR PROPHYLACTIC MEASURES, UNSPECIFIED: ICD-10-CM

## 2025-06-05 DIAGNOSIS — E78.5 HYPERLIPIDEMIA, UNSPECIFIED: ICD-10-CM

## 2025-06-05 DIAGNOSIS — I10 ESSENTIAL (PRIMARY) HYPERTENSION: ICD-10-CM

## 2025-06-05 DIAGNOSIS — I63.9 CEREBRAL INFARCTION, UNSPECIFIED: ICD-10-CM

## 2025-06-05 DIAGNOSIS — N17.9 ACUTE KIDNEY FAILURE, UNSPECIFIED: ICD-10-CM

## 2025-06-05 DIAGNOSIS — R55 SYNCOPE AND COLLAPSE: ICD-10-CM

## 2025-06-05 DIAGNOSIS — G40.909 EPILEPSY, UNSPECIFIED, NOT INTRACTABLE, WITHOUT STATUS EPILEPTICUS: ICD-10-CM

## 2025-06-05 LAB
APPEARANCE UR: CLEAR — SIGNIFICANT CHANGE UP
APTT BLD: 34.5 SEC — SIGNIFICANT CHANGE UP (ref 26.1–36.8)
BASOPHILS # BLD AUTO: 0.02 K/UL — SIGNIFICANT CHANGE UP (ref 0–0.2)
BASOPHILS NFR BLD AUTO: 0.2 % — SIGNIFICANT CHANGE UP (ref 0–2)
BILIRUB UR-MCNC: NEGATIVE — SIGNIFICANT CHANGE UP
BLD GP AB SCN SERPL QL: NEGATIVE — SIGNIFICANT CHANGE UP
CHLORIDE UR-SCNC: 67 MMOL/L — SIGNIFICANT CHANGE UP
COLOR SPEC: YELLOW — SIGNIFICANT CHANGE UP
CREAT ?TM UR-MCNC: 58 MG/DL — SIGNIFICANT CHANGE UP
DIFF PNL FLD: ABNORMAL
EOSINOPHIL # BLD AUTO: 0.12 K/UL — SIGNIFICANT CHANGE UP (ref 0–0.5)
EOSINOPHIL NFR BLD AUTO: 1.4 % — SIGNIFICANT CHANGE UP (ref 0–6)
GLUCOSE UR QL: 500 MG/DL
HCT VFR BLD CALC: 40.2 % — SIGNIFICANT CHANGE UP (ref 34.5–45)
HCT VFR BLD CALC: 42.4 % — SIGNIFICANT CHANGE UP (ref 34.5–45)
HGB BLD-MCNC: 13.1 G/DL — SIGNIFICANT CHANGE UP (ref 11.5–15.5)
HGB BLD-MCNC: 13.7 G/DL — SIGNIFICANT CHANGE UP (ref 11.5–15.5)
IANC: 6.53 K/UL — SIGNIFICANT CHANGE UP (ref 1.8–7.4)
IMM GRANULOCYTES NFR BLD AUTO: 0.3 % — SIGNIFICANT CHANGE UP (ref 0–0.9)
INR BLD: 1.1 RATIO — SIGNIFICANT CHANGE UP (ref 0.85–1.16)
KETONES UR QL: NEGATIVE MG/DL — SIGNIFICANT CHANGE UP
LEUKOCYTE ESTERASE UR-ACNC: NEGATIVE — SIGNIFICANT CHANGE UP
LYMPHOCYTES # BLD AUTO: 1.56 K/UL — SIGNIFICANT CHANGE UP (ref 1–3.3)
LYMPHOCYTES # BLD AUTO: 17.6 % — SIGNIFICANT CHANGE UP (ref 13–44)
MCHC RBC-ENTMCNC: 28.9 PG — SIGNIFICANT CHANGE UP (ref 27–34)
MCHC RBC-ENTMCNC: 29.1 PG — SIGNIFICANT CHANGE UP (ref 27–34)
MCHC RBC-ENTMCNC: 32.3 G/DL — SIGNIFICANT CHANGE UP (ref 32–36)
MCHC RBC-ENTMCNC: 32.6 G/DL — SIGNIFICANT CHANGE UP (ref 32–36)
MCV RBC AUTO: 88.5 FL — SIGNIFICANT CHANGE UP (ref 80–100)
MCV RBC AUTO: 90.2 FL — SIGNIFICANT CHANGE UP (ref 80–100)
MONOCYTES # BLD AUTO: 0.62 K/UL — SIGNIFICANT CHANGE UP (ref 0–0.9)
MONOCYTES NFR BLD AUTO: 7 % — SIGNIFICANT CHANGE UP (ref 2–14)
NEUTROPHILS # BLD AUTO: 6.53 K/UL — SIGNIFICANT CHANGE UP (ref 1.8–7.4)
NEUTROPHILS NFR BLD AUTO: 73.5 % — SIGNIFICANT CHANGE UP (ref 43–77)
NITRITE UR-MCNC: NEGATIVE — SIGNIFICANT CHANGE UP
NRBC # BLD AUTO: 0 K/UL — SIGNIFICANT CHANGE UP (ref 0–0)
NRBC # BLD AUTO: 0 K/UL — SIGNIFICANT CHANGE UP (ref 0–0)
NRBC # FLD: 0 K/UL — SIGNIFICANT CHANGE UP (ref 0–0)
NRBC # FLD: 0 K/UL — SIGNIFICANT CHANGE UP (ref 0–0)
NRBC BLD AUTO-RTO: 0 /100 WBCS — SIGNIFICANT CHANGE UP (ref 0–0)
NRBC BLD AUTO-RTO: 0 /100 WBCS — SIGNIFICANT CHANGE UP (ref 0–0)
PH UR: 7 — SIGNIFICANT CHANGE UP (ref 5–8)
PLATELET # BLD AUTO: 138 K/UL — LOW (ref 150–400)
PLATELET # BLD AUTO: 151 K/UL — SIGNIFICANT CHANGE UP (ref 150–400)
POTASSIUM UR-SCNC: 26 MMOL/L — SIGNIFICANT CHANGE UP
PROT UR-MCNC: NEGATIVE MG/DL — SIGNIFICANT CHANGE UP
PROTHROM AB SERPL-ACNC: 12.8 SEC — SIGNIFICANT CHANGE UP (ref 9.9–13.4)
RBC # BLD: 4.54 M/UL — SIGNIFICANT CHANGE UP (ref 3.8–5.2)
RBC # BLD: 4.7 M/UL — SIGNIFICANT CHANGE UP (ref 3.8–5.2)
RBC # FLD: 14.2 % — SIGNIFICANT CHANGE UP (ref 10.3–14.5)
RBC # FLD: 14.5 % — SIGNIFICANT CHANGE UP (ref 10.3–14.5)
RH IG SCN BLD-IMP: POSITIVE — SIGNIFICANT CHANGE UP
SODIUM UR-SCNC: 75 MMOL/L — SIGNIFICANT CHANGE UP
SP GR SPEC: 1.05 — HIGH (ref 1–1.03)
TROPONIN T, HIGH SENSITIVITY RESULT: 15 NG/L — SIGNIFICANT CHANGE UP
UROBILINOGEN FLD QL: 0.2 MG/DL — SIGNIFICANT CHANGE UP (ref 0.2–1)
WBC # BLD: 8.88 K/UL — SIGNIFICANT CHANGE UP (ref 3.8–10.5)
WBC # BLD: 9.69 K/UL — SIGNIFICANT CHANGE UP (ref 3.8–10.5)
WBC # FLD AUTO: 8.88 K/UL — SIGNIFICANT CHANGE UP (ref 3.8–10.5)
WBC # FLD AUTO: 9.69 K/UL — SIGNIFICANT CHANGE UP (ref 3.8–10.5)

## 2025-06-05 PROCEDURE — 99223 1ST HOSP IP/OBS HIGH 75: CPT

## 2025-06-05 PROCEDURE — 70496 CT ANGIOGRAPHY HEAD: CPT | Mod: 26

## 2025-06-05 PROCEDURE — 70450 CT HEAD/BRAIN W/O DYE: CPT | Mod: 26,59,77

## 2025-06-05 PROCEDURE — 70450 CT HEAD/BRAIN W/O DYE: CPT | Mod: 26,XU

## 2025-06-05 PROCEDURE — 74174 CTA ABD&PLVS W/CONTRAST: CPT | Mod: 26

## 2025-06-05 PROCEDURE — 93306 TTE W/DOPPLER COMPLETE: CPT | Mod: 26

## 2025-06-05 PROCEDURE — 70498 CT ANGIOGRAPHY NECK: CPT | Mod: 26

## 2025-06-05 PROCEDURE — 99221 1ST HOSP IP/OBS SF/LOW 40: CPT

## 2025-06-05 RX ORDER — METRONIDAZOLE 250 MG
500 TABLET ORAL ONCE
Refills: 0 | Status: COMPLETED | OUTPATIENT
Start: 2025-06-05 | End: 2025-06-05

## 2025-06-05 RX ORDER — SENNA 187 MG
2 TABLET ORAL AT BEDTIME
Refills: 0 | Status: DISCONTINUED | OUTPATIENT
Start: 2025-06-05 | End: 2025-06-11

## 2025-06-05 RX ORDER — METRONIDAZOLE 250 MG
500 TABLET ORAL EVERY 12 HOURS
Refills: 0 | Status: DISCONTINUED | OUTPATIENT
Start: 2025-06-05 | End: 2025-06-06

## 2025-06-05 RX ORDER — ACETAMINOPHEN 500 MG/5ML
1000 LIQUID (ML) ORAL ONCE
Refills: 0 | Status: COMPLETED | OUTPATIENT
Start: 2025-06-05 | End: 2025-06-05

## 2025-06-05 RX ORDER — POLYETHYLENE GLYCOL 3350 17 G/17G
17 POWDER, FOR SOLUTION ORAL
Refills: 0 | Status: DISCONTINUED | OUTPATIENT
Start: 2025-06-05 | End: 2025-06-11

## 2025-06-05 RX ORDER — LACOSAMIDE 150 MG/1
100 TABLET, FILM COATED ORAL
Refills: 0 | Status: DISCONTINUED | OUTPATIENT
Start: 2025-06-05 | End: 2025-06-11

## 2025-06-05 RX ORDER — CEFTRIAXONE 500 MG/1
1000 INJECTION, POWDER, FOR SOLUTION INTRAMUSCULAR; INTRAVENOUS ONCE
Refills: 0 | Status: COMPLETED | OUTPATIENT
Start: 2025-06-05 | End: 2025-06-05

## 2025-06-05 RX ORDER — CIPROFLOXACIN HCL 250 MG
500 TABLET ORAL EVERY 12 HOURS
Refills: 0 | Status: DISCONTINUED | OUTPATIENT
Start: 2025-06-06 | End: 2025-06-06

## 2025-06-05 RX ORDER — ATORVASTATIN CALCIUM 80 MG/1
40 TABLET, FILM COATED ORAL AT BEDTIME
Refills: 0 | Status: DISCONTINUED | OUTPATIENT
Start: 2025-06-05 | End: 2025-06-11

## 2025-06-05 RX ORDER — OLANZAPINE 10 MG/1
2.5 TABLET ORAL ONCE
Refills: 0 | Status: COMPLETED | OUTPATIENT
Start: 2025-06-05 | End: 2025-06-05

## 2025-06-05 RX ORDER — HALOPERIDOL 10 MG/1
2 TABLET ORAL ONCE
Refills: 0 | Status: COMPLETED | OUTPATIENT
Start: 2025-06-05 | End: 2025-06-05

## 2025-06-05 RX ORDER — PROTHROMBIN COMPLEX CONCENTRATE (HUMAN) 25.5; 16.5; 24; 22; 22; 26 [IU]/ML; [IU]/ML; [IU]/ML; [IU]/ML; [IU]/ML; [IU]/ML
5000 POWDER, FOR SOLUTION INTRAVENOUS ONCE
Refills: 0 | Status: COMPLETED | OUTPATIENT
Start: 2025-06-05 | End: 2025-06-05

## 2025-06-05 RX ADMIN — Medication 500 MILLIGRAM(S): at 17:52

## 2025-06-05 RX ADMIN — Medication 40 MILLIGRAM(S): at 09:26

## 2025-06-05 RX ADMIN — Medication 100 MILLIGRAM(S): at 07:19

## 2025-06-05 RX ADMIN — Medication 40 MILLIGRAM(S): at 18:03

## 2025-06-05 RX ADMIN — Medication 2 MILLIGRAM(S): at 14:32

## 2025-06-05 RX ADMIN — CEFTRIAXONE 100 MILLIGRAM(S): 500 INJECTION, POWDER, FOR SOLUTION INTRAMUSCULAR; INTRAVENOUS at 06:45

## 2025-06-05 RX ADMIN — LACOSAMIDE 100 MILLIGRAM(S): 150 TABLET, FILM COATED ORAL at 17:55

## 2025-06-05 RX ADMIN — Medication 1000 MILLIGRAM(S): at 10:19

## 2025-06-05 RX ADMIN — PROTHROMBIN COMPLEX CONCENTRATE (HUMAN) 400 INTERNATIONAL UNIT(S): 25.5; 16.5; 24; 22; 22; 26 POWDER, FOR SOLUTION INTRAVENOUS at 15:48

## 2025-06-05 RX ADMIN — Medication 400 MILLIGRAM(S): at 09:28

## 2025-06-05 RX ADMIN — OLANZAPINE 2.5 MILLIGRAM(S): 10 TABLET ORAL at 03:25

## 2025-06-05 RX ADMIN — POLYETHYLENE GLYCOL 3350 17 GRAM(S): 17 POWDER, FOR SOLUTION ORAL at 18:39

## 2025-06-05 RX ADMIN — HALOPERIDOL 2 MILLIGRAM(S): 10 TABLET ORAL at 02:54

## 2025-06-05 NOTE — H&P ADULT - NSHPSOCIALHISTORY_GEN_ALL_CORE
Living situation: Pt lives with her 2 daughters at home  Ambulation status: Walks using a walker for ambulation  Dependent in all ADLs.  Denies alcohol or recreational drug use. Former smoker (1 PPD x 30 yrs, quit 30 yrs ago)

## 2025-06-05 NOTE — H&P ADULT - HISTORY OF PRESENT ILLNESS
85 y/o female with PMH of CVA in 2020 (non-verbal at baseline), A-fib on Eliquis, HTN, HLD, and seizure disorder presented to the ED c/o 1 episode of large, dark red blood per rectum. The pt subsequently lost consciousness for about a minute, as witnessed and reported by pt's daughters. Family then called EMS. Per EMS, blood pressure 60/40s in field -->112/64 upon ED arrival with HR in 60s. Of note, pt last took Eliquis 5 mg yesterday morning.     In the ED, VSS. Hgb resulted 13.2 (hgb 14.1 on 5/16/2025). She received 1U pRBC for symptomatic, acute blood loss anemia.   CTA Abdomen/Pelvis reviewed: Rectal wall thickening with perirectal edema. Findings suggests proctitis. There is no CT evidence of active GI bleeding.  Pt recieved 1 L NS bolus, Protonix 80 mg IV x 1, and 1 dose of CTX and Flagyl in the ER.
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
## 2025-06-05 NOTE — CONSULT NOTE ADULT - ATTENDING COMMENTS
84F with hx CVA, AF on Eliquis, recent PNA, had syncope on commode with BRBPR. Had been constipated for a few weeks.   Hgb 13.2, given one unit and went up to 13.7.   CTA with diffuse rectal wall thickening  DDx stercoral colitis, rectal ulcer, diverticular bleed  Unfortunately also now found with brain parenchymal bleed  Recommend bowel regimen as per fellow note  Would monitor bm and hgb

## 2025-06-05 NOTE — H&P ADULT - PROBLEM SELECTOR PLAN 3
- H/o A-fib and is on Metoprolol tartrate 50 mg BID and Eliquis 5 mg BID at home (Hold both for now i/s/o GI bleed)  - Continuous telemetry monitoring  - Troponins negative x 2  - Obtain TTE  - Monitor electrolytes and replete as needed - Elevated BUN / Cr: 18/1.47 on admission, unknown baseline Cr (RJ vs CKD?)  - S/p 1 L NS bolus in the ER  - Monitor BMP daily; trend Cr  - Monitor urine output  - Send urine electrolytes   - Check bladder scan  - Avoid nephrotoxic agents  - Renally dose medications

## 2025-06-05 NOTE — CONSULT NOTE ADULT - TIME BILLING
In summary,     43 year old F with PMH of HTN presents for evaluation of abnormal serology - PARUL <1:80 and positive RNP drawn in the context of malar rash. Patient has had a fixed malar rash over her face for last 4 years which is present daily and does not remit. Denies any clear exacerbating factors such as sunlight, no similar rashes on other parts of the body. Goes on to deny unexplained fevers, LAD, alopecia, oral or nasal sores, pleurisy, hospitalizations for effusions/blood clots/miscarriages, sicca or raynaud's. No history of leander insufficiency or history of cytopenias.     Low suspicion of lupus given such low PARUL titer and characteristic of rash (ie fixed, no association with sunlight). RNP occurring without raynaud's or inflammatory arthritis, again low suspicion for MCTD. Continue care through PCP for rosacea. No need to repeat PARUL serologies or test. Recommend reevaluation should she go on to develop additional s/s of SLE noted above.      
Imaging review, plan formulation, coordination of care

## 2025-06-05 NOTE — H&P ADULT - PROBLEM SELECTOR PLAN 1
- Pt presented to the ED c/o 1 episode of large, dark red blood per rectum.   - Likely i/s/o Eliquis use (for Afib); last dose yesterday morning  - CTA Abdomen/Pelvis Impression: Rectal wall thickening with perirectal edema. Findings suggests   proctitis. There is no CT evidence of active GI bleeding.  - Keep pt NPO  - Currently hemodynamically stable   - s/p Pantoprazole 80 mg IV x1 in ED  - C/w IV Protonix 40 mg BID   - Start Ciprofloxacin/Flagyl for proctitis noted on CTA A/P  - Active type and screen, maintain 2 large bore IVs at all times   - Trend CBC q8h; transfuse as needed if Hgb  < 8 gm/dL (s/p 1U pRBC in the ER overnight for symptomatic, acute blood loss anemia)   - HOLD all NSAIDs, antiplatelets, and anticoagulants  - SCDs for DVT prophylaxis  - Monitor vital signs and hemodynamics closely  - GI recommendations appreciated - f/u today

## 2025-06-05 NOTE — H&P ADULT - PROBLEM SELECTOR PLAN 4
- Hold all home anti-hypertensive agents (Metoprolol, Enalapril, HCTZ) for now i/s/o GI bleed  - Monitor vitals q4h - H/o A-fib and is on Metoprolol tartrate 50 mg BID and Eliquis 5 mg BID at home (Hold both for now i/s/o GI bleed)  - Continuous telemetry monitoring  - Troponins negative x 2  - Obtain TTE  - Monitor electrolytes and replete as needed

## 2025-06-05 NOTE — CONSULT NOTE ADULT - SUBJECTIVE AND OBJECTIVE BOX
Chief Complaint:  Patient is a 84y old  Female who presents with a chief complaint of GI bleed; Syncope (2025 11:56)    HPI:  84F with hx CVA (nonverbal), AF (Eliquis, last 6/4 AM), recent admission at [facility] for pneumonia 3 weeks ago (different chart with MRN [id number]), brought in by EMS for syncope and GI bleed. Patient had witnessed syncope (no trauma, while on the commode) by daughters yesterday, associated with BRBPR with stools and episode of vomiting earlier in the day prior to bleeding. Notes constipation with hard stools and straining over the last couple weeks, needing miralax and senna without much improvement. Notes chronic abdominal pain with worsening over the past few weeks, better with BM. No reported fever, chills, diarrhea per son and granddaughter at bedside. No prior colonoscopy. No FHx CRC.    Allergies:  No Known Allergies      Home Medications:    Hospital Medications:  atorvastatin 40 milliGRAM(s) Oral at bedtime  lacosamide 100 milliGRAM(s) Oral two times a day  metroNIDAZOLE    Tablet 500 milliGRAM(s) Oral every 12 hours  morphine  - Injectable 2 milliGRAM(s) IV Push once  pantoprazole  Injectable 40 milliGRAM(s) IV Push every 12 hours  potassium chloride   Powder 40 milliEquivalent(s) Oral once  prothrombin complex concentrate human-lans IVPB (BALFAXAR) 5000 International Unit(s) IV Intermittent once      PMHX/PSHX:  Afib    H/O: CVA (cerebrovascular accident)    No significant past surgical history      ROS:  Unable to obtain    PHYSICAL EXAM:  GENERAL:  No acute distress  HEENT:  no scleral icterus  CHEST:  no accessory muscle use  HEART:  Regular rate and rhythm  ABDOMEN:  Soft, non-tender, non-distended  EXTREMITIES: No edema  SKIN:  No rash/ecchymoses  NEURO:  non-verbal; does not follow commands    Vital Signs:  Vital Signs Last 24 Hrs  T(C): 37.1 (2025 11:49), Max: 37.1 (2025 11:49)  T(F): 98.8 (2025 11:49), Max: 98.8 (2025 11:49)  HR: 86 (2025 11:49) (64 - 86)  BP: 151/87 (2025 11:49) (99/56 - 164/89)  BP(mean): 70 (2025 23:24) (70 - 70)  RR: 18 (2025 11:49) (16 - 20)  SpO2: 100% (2025 11:49) (96% - 100%)    Parameters below as of 2025 11:49  Patient On (Oxygen Delivery Method): room air      Daily     Daily     LABS:                        13.7   8.88  )-----------( 138      ( 2025 06:12 )             42.4     Mean Cell Volume: 90.2 fL (25 @ 06:12)    -    142  |  103  |  18  ----------------------------<  155[H]  3.4[L]   |  29  |  1.47[H]    Ca    10.0      2025 23:05    TPro  7.2  /  Alb  3.5  /  TBili  0.4  /  DBili  x   /  AST  17  /  ALT  12  /  AlkPhos  120  06-04    LIVER FUNCTIONS - ( 2025 23:05 )  Alb: 3.5 g/dL / Pro: 7.2 g/dL / ALK PHOS: 120 U/L / ALT: 12 U/L / AST: 17 U/L / GGT: x           PT/INR - ( 2025 23:05 )   PT: 16.8 sec;   INR: 1.42 ratio         PTT - ( 2025 23:05 )  PTT:30.0 sec  Urinalysis Basic - ( 2025 10:50 )    Color: Yellow / Appearance: Clear / S.049 / pH: x  Gluc: x / Ketone: x  / Bili: Negative / Urobili: 0.2 mg/dL   Blood: x / Protein: Negative mg/dL / Nitrite: Negative   Leuk Esterase: Negative / RBC: 6 /HPF / WBC 5 /HPF   Sq Epi: x / Non Sq Epi: 8 /HPF / Bacteria: Negative /HPF                              13.7   8.88  )-----------( 138      ( 2025 06:12 )             42.4                         13.2   9.21  )-----------( 182      ( 2025 23:05 )             42.0       Imaging:    < from: CT Angio Abdomen and Pelvis w/ IV Cont (25 @ 05:31) >    FINDINGS:  LOWER CHEST: Theheart is enlarged. Valvular, coronary and aortic   calcifications are noted. Dependent bibasilar atelectasis is appreciated..    LIVER: Subcentimeter hypodensity of the right hepatic lobe is too small   to characterize. The liver is otherwise unremarkable.  BILE DUCTS: Normal caliber.  GALLBLADDER: Within normal limits.  SPLEEN: Within normal limits.  PANCREAS: Within normal limits.  ADRENALS: Within normal limits.  KIDNEYS/URETERS: There is a large right renal cyst measuring 15.8 x 11.3   x 14.3cm. Additional smaller bilateral renal cysts are noted as well as   subcentimeter hypodensities which are too small to accurately   characterize. There is a left renal scarring. There is a fatty density   subcentimeter lesion of the left kidney, compatible with an   angiomyolipoma. There is no urinary tract obstruction.    BLADDER: Within normal limits.  REPRODUCTIVE ORGANS: Uterus and adnexa are unremarkable.    BOWEL: Diffuse wall thickening of the rectum. There is adjacent   perirectal fat stranding. There is no CT evidence of active GI bleeding   at the time scanning. There is colonic diverticulosis. There is no focal   diverticulitis. There is a small hiatal hernia.  PERITONEUM/RETROPERITONEUM: Within normal limits.  VESSELS: Atherosclerotic changes.  LYMPH NODES: No lymphadenopathy.  ABDOMINAL WALL: Thinning and laxity of the right anterior abdominal wall.  BONES: Degenerative changes. Chronic appearing vertebral body height loss   throughout the spine..    IMPRESSION:  Rectal wall thickening with perirectal edema. Findings suggests   proctitis. There is no CT evidence of active GI bleeding.    Dominant large right renal cyst.    Additional findings as above.    < end of copied text >

## 2025-06-05 NOTE — H&P ADULT - NSHPADDITIONALINFOADULT_GEN_ALL_CORE
Spoke with the patient's daughter Ani at bedside and had an extensive conversation regarding the patient's current status, diagnostics, treatment plan, and overall prognosis.   All questions were answered and family appreciated the update.

## 2025-06-05 NOTE — H&P ADULT - NSHPREVIEWOFSYSTEMS_GEN_ALL_CORE
CONSTITUTIONAL:  No weakness, fever, or chills  EYES/ENT:  No visual changes;  No vertigo or throat pain   NECK:  No pain or stiffness  RESPIRATORY:  No cough or shortness of breath  CARDIOVASCULAR:  No chest pain or palpitations  GASTROINTESTINAL:  c/o abdominal pain. No nausea or vomiting; No diarrhea or constipation. c/o dark red blood with stools.   GENITOURINARY:  No dysuria, frequency, or hematuria  MUSCULOSKELETAL:  Decreased strength in all extremities,   NEUROLOGICAL:  No numbness or tingling. B/l LE weakness+    SKIN:  No itching, no rashes or lesions

## 2025-06-05 NOTE — CONSULT NOTE ADULT - ASSESSMENT
84y Female PMH CVA non verbal at baseline, ambulates with walker at home per daughter, afib on eliquis, PNA 3 wks ago was admitted at [facility] VS p/w rectal bleeding, syncope. Per daughter at bedside would not want any neurosurgical intervention if bleed would to expand but will have to discuss with siblings. admitted for likely GI bleed.     P:  - Hold eliquis  - give Kcentra for reversal   - rpt CTH 4h than in 12h for stability  - INR goal < 1.4  - q4 neuro checks  - syncope workup    d/w attending

## 2025-06-05 NOTE — CONSULT NOTE ADULT - SUBJECTIVE AND OBJECTIVE BOX
And some libido issues.   Consider counseling.  Provided sildenafil Rx to trial.  Discussed side effect profile   NEUROSURGERY CONSULT    HPI: 84y Female PMH CVA non verbal at baseline, ambulates with walker at home per daughter, afib on eliquis, PNA 3 wks ago was admitted at [facility] VS p/w rectal bleeding, syncope.     RADIOLOGY: COMPARISON: None.    Multiple axial sections were performed from the base of skull to vertex   without contrast enhancement. Coronal and sagittal reconstructions were   performed    Parenchymal volume loss and chronic microvessel ischemic changes are   identified    There is acute parenchymal hemorrhage with some surrounding edema seen  involving the left cerebellar region which measures approximately 3.5 x   2.9 x 3.0 cm. This finding does cause localized mass effect. No   significant shift or herniation is seen.    Areas of encephalomalacia and gliosis involving the left frontaland   right posterior frontal parietal region are identified. This compatible   old infarcts. Stent is seen involving the left middle cerebral artery   with area of embolic material also suspected. Evaluation of the osseous   structures with the appropriate window appears unremarkable    IMPRESSION: Acute parenchymal hemorrhage is identified.      MEDS:  atorvastatin 40 milliGRAM(s) Oral at bedtime  lacosamide 100 milliGRAM(s) Oral two times a day  metroNIDAZOLE    Tablet 500 milliGRAM(s) Oral every 12 hours  pantoprazole  Injectable 40 milliGRAM(s) IV Push every 12 hours  potassium chloride   Powder 40 milliEquivalent(s) Oral once        Vital Signs Last 24 Hrs  T(C): 37.1 (05 Jun 2025 11:49), Max: 37.1 (05 Jun 2025 11:49)  T(F): 98.8 (05 Jun 2025 11:49), Max: 98.8 (05 Jun 2025 11:49)  HR: 86 (05 Jun 2025 11:49) (64 - 86)  BP: 151/87 (05 Jun 2025 11:49) (99/56 - 164/89)  BP(mean): 70 (04 Jun 2025 23:24) (70 - 70)  RR: 18 (05 Jun 2025 11:49) (16 - 20)  SpO2: 100% (05 Jun 2025 11:49) (96% - 100%)    Parameters below as of 05 Jun 2025 11:49  Patient On (Oxygen Delivery Method): room air        LABS:                        13.7   8.88  )-----------( 138      ( 05 Jun 2025 06:12 )             42.4     06-04    142  |  103  |  18  ----------------------------<  155[H]  3.4[L]   |  29  |  1.47[H]    Ca    10.0      04 Jun 2025 23:05    TPro  7.2  /  Alb  3.5  /  TBili  0.4  /  DBili  x   /  AST  17  /  ALT  12  /  AlkPhos  120  06-04    PT/INR - ( 04 Jun 2025 23:05 )   PT: 16.8 sec;   INR: 1.42 ratio         PTT - ( 04 Jun 2025 23:05 )  PTT:30.0 sec      PHYSICAL EXAM: awake, NV, perrl, fc  tracts  nam 4/5  trying to get oob

## 2025-06-05 NOTE — CONSULT NOTE ADULT - ASSESSMENT
84F with hx CVA (nonverbal), AF (Eliquis, last 6/4 AM), recent admission at [facility] for pneumonia 3 weeks ago (different chart with MRN [id number]), admitted for rectal bleeding    Impression  #rectal bleeding  #constipation  #chronic abdominal pain  #acute parenchymal hemorrhage  Patient non-verbal with limited mobility admitted following syncopal episode with episode of BRBPR mixed with stools with course now notable for acute parenchymal hemorrhage. No prior colonoscopy. No FHx CRC. Hypotensive on presentation with systolic in 90s now improved to 140s; HR 60-70s. Labs: Hgb 13.7 <- 13.2 (after 1U pRBC); plts 138 <- 182. CTA A/P: Diffuse wall thickening of the rectum with perirectal fat stranding. Diverticulosis. Negative for GI bleed. DDx for rectal bleeding include rectal ulcer vs. stercoral colitis vs. hemorrhoidal bleeding vs. less likely diverticular vs. malignancy. Given acute parenchymal hemorrhage and Hgb 13, would recommend medical management at this time.    Recommendations  - bowel regimen: miralax BID, fiber supplement, senna PRN  - no immediate plans for endoscopic evaluation at this time  - monitor H/H and signs of overt bleeding; transfuse PRN    Note and recommendations are incomplete until reviewed and attested by attending.    Nehemias Donald MD  GI/Hepatology Fellow, PGY5  Long Range Pager [phone] or [facility] Pager [phone]  Teams preferred (7AM to 5PM); after 5PM, call GI fellow on call    On Weekends/Holidays (All Day) and Weekdays after 5PM to 8AM  For non-urgent consults, please email [email or web address] and [email or web address]  For urgent consults, please contact on call GI team. See Amion schedule ([facility]), Just Dial paging system ([facility]), or call hospital  ([facility]) 84F with hx CVA (nonverbal), AF (Eliquis, last 6/4 AM), recent admission at [facility] for pneumonia 3 weeks ago (different chart with MRN [id number]), admitted for rectal bleeding    Impression  #rectal bleeding  #constipation  #chronic abdominal pain  #acute parenchymal hemorrhage  Patient non-verbal with limited mobility admitted following syncopal episode with episode of BRBPR mixed with stools with course now notable for acute parenchymal hemorrhage. No prior colonoscopy. No FHx CRC. Hypotensive on presentation with systolic in 90s now improved to 140s; HR 60-70s. Labs: Hgb 13.7 <- 13.2 (after 1U pRBC); plts 138 <- 182. CTA A/P: Diffuse wall thickening of the rectum with perirectal fat stranding. Diverticulosis. Negative for GI bleed. DDx for rectal bleeding include rectal ulcer vs. stercoral colitis vs. hemorrhoidal bleeding vs. less likely diverticular vs. malignancy. Given acute parenchymal hemorrhage and Hgb 13, would recommend medical management at this time.    Recommendations  - bowel regimen: miralax BID, fiber supplement, senna PRN  - no immediate plans for endoscopic evaluation at this time  - monitor H/H and signs of overt bleeding; transfuse PRN  - GI to sign off. Please call with questions    Note and recommendations are incomplete until reviewed and attested by attending.    Nehemias Donald MD  GI/Hepatology Fellow, PGY5  Long Range Pager [phone] or [facility] Pager [phone]  Teams preferred (7AM to 5PM); after 5PM, call GI fellow on call    On Weekends/Holidays (All Day) and Weekdays after 5PM to 8AM  For non-urgent consults, please email [email or web address] and [email or web address]  For urgent consults, please contact on call GI team. See Amion schedule ([facility]), [employer] paging system ([facility]), or call hospital  ([facility])

## 2025-06-05 NOTE — H&P ADULT - PROBLEM SELECTOR PLAN 7
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

## 2025-06-05 NOTE — H&P ADULT - PROBLEM SELECTOR PLAN 5
- Continue home dose Atorvastatin 40 mg daily at bedtime - Hold all home anti-hypertensive agents (Metoprolol, Enalapril, HCTZ) for now i/s/o GI bleed  - Monitor vitals q4h

## 2025-06-05 NOTE — CONSULT NOTE ADULT - NS ATTEND AMEND GEN_ALL_CORE FT
84y Female PMH CVA non verbal at baseline, ambulates with walker at home per daughter, afib on eliquis, PNA 3 wks ago was admitted at [facility] VS p/w rectal bleeding, syncope. Per daughter at bedside would not want any neurosurgical intervention if bleed would to expand but will have to discuss with siblings. admitted for likely GI bleed.     P:  - Hold eliquis  - give Kcentra for reversal   - rpt CTH 4h than in 12h for stability  - INR goal < 1.4  - q4 neuro checks  - syncope workup

## 2025-06-05 NOTE — PATIENT PROFILE ADULT - FALL HARM RISK - HARM RISK INTERVENTIONS

## 2025-06-05 NOTE — H&P ADULT - ASSESSMENT
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 The pt subsequently lost consciousness for about a minute, as witnessed and reported by pt's daughters. Admitted for GI bleed and syncope workup .

## 2025-06-05 NOTE — H&P ADULT - NSHPLABSRESULTS_GEN_ALL_CORE
13.7   8.88  )-----------( 138      ( 05 Jun 2025 06:12 )             42.4       06-04    142  |  103  |  18  ----------------------------<  155[H]  3.4[L]   |  29  |  1.47[H]    Ca    10.0      04 Jun 2025 23:05    TPro  7.2  /  Alb  3.5  /  TBili  0.4  /  DBili  x   /  AST  17  /  ALT  12  /  AlkPhos  120  06-04      CTA Abdomen/Pelvis Impression:     Rectal wall thickening with perirectal edema. Findings suggests   proctitis. There is no CT evidence of active GI bleeding.    Dominant large right renal cyst.

## 2025-06-05 NOTE — ED ADULT NURSE REASSESSMENT NOTE - NS ED NURSE REASSESS COMMENT FT1
Received report from night shift RN. Pt is A&Ox2, daughter at bedside for translation. Pt endorsing abdominal pain at this time, visibly uncomfortable. Respirations even and unlabored, chest rise equal b/l. Afib noted on cardiac monitor. VS as noted in flow sheets. Pt unable to tolerate oral temperature. Pt denies chest pain, SOB, N/V/D, bloody stools, h/a, dizziness, numbness/tingling or any urinary symptoms at this time. ACP Nicki made aware of pt pain. No acute distress noted. Safety maintained throughout. Received report from night shift RN. Pt is A&Ox2, daughter at bedside for translation. Pt endorsing abdominal pain at this time, visibly uncomfortable. Respirations even and unlabored, chest rise equal b/l. Afib noted on cardiac monitor. VS as noted in flow sheets. Pt unable to tolerate oral temperature. Pt denies chest pain, SOB, N/V/D, bloody stools, h/a, dizziness, numbness/tingling or any urinary symptoms at this time. ACP NP Kennedy made aware of pt pain. No acute distress noted. Safety maintained throughout.

## 2025-06-05 NOTE — H&P ADULT - NSHPPHYSICALEXAM_GEN_ALL_CORE
Vital Signs Last 24 Hrs  T(C): 36.7 (05 Jun 2025 08:25), Max: 36.7 (05 Jun 2025 04:40)  T(F): 98.1 (05 Jun 2025 08:25), Max: 98.1 (05 Jun 2025 04:40)  HR: 80 (05 Jun 2025 08:25) (64 - 80)  BP: 164/89 (05 Jun 2025 08:25) (99/56 - 164/89)  BP(mean): 70 (04 Jun 2025 23:24) (70 - 70)  RR: 20 (05 Jun 2025 10:28) (16 - 20)  SpO2: 99% (05 Jun 2025 10:28) (96% - 99%)    Parameters below as of 05 Jun 2025 10:28  Patient On (Oxygen Delivery Method): room air      PHYSICAL EXAM:  GENERAL: NAD, well-developed  HEAD:  Atraumatic, Normocephalic  EYES: EOMI, PERRLA, conjunctiva and sclera clear  NECK: Supple, No JVD  CHEST/LUNG: Clear to auscultation bilaterally; No wheezing, rhonchi, or rales  HEART: Regular rate and rhythm; No murmurs, rubs, or gallops  ABDOMEN: Soft, LLQ TTP+, distended; Bowel sounds present  EXTREMITIES:  2+ Peripheral Pulses, No clubbing, cyanosis, or edema  NEUROLOGY: Awake and alert to person only, non-verbal at baseline, follows simple commands, able to move b/l UE but unable to move b/l LE   SKIN: No rashes or lesions observed

## 2025-06-05 NOTE — ED ADULT NURSE REASSESSMENT NOTE - GENERAL PATIENT STATE
Pt on cm vs q10m vss daughter at bedside./comfortable appearance/family/SO at bedside/resting/sleeping [NAD] : in no acute distress [icteric] : anicteric [Moist & Pink Mucous Membranes] : moist and pink mucous membranes [CTAB] : lungs clear to auscultation bilaterally [Respiratory Distress] : no respiratory distress  [Regular Rate and Rhythm] : regular rate and rhythm [Normal S1, S2] : normal S1 and S2 [Soft] : soft  [Distended] : non distended [Tender] : non tender [Normal Bowel Sounds] : normal bowel sounds [No HSM] : no hepatosplenomegaly appreciated [Normal Tone] : normal tone [Well-Perfused] : well-perfused [Edema] : no edema [Cyanosis] : no cyanosis [Rash] : no rash [Jaundice] : no jaundice [Interactive] : interactive [FreeTextEntry1] : overweight

## 2025-06-05 NOTE — ED ADULT NURSE NOTE - OBJECTIVE STATEMENT
patient received to 28, non verbal, coming to ed for C/O rectal bleeding, as per family, patient had several bowel movements with big clots of blood. patient alert, slightly hypotensive, unknown if patient feels any dizziness lightheadedness. breathing even and unlabored in bed. Pt appears to have no complaints currently. Bed in lowest position, call bell within reach. All safety precautions in place. IV 20G placed to L AC, existing 20G to R AC from EMS. labs drawn, pending blood transfusion.

## 2025-06-05 NOTE — H&P ADULT - PROBLEM SELECTOR PLAN 2
- Witnessed syncopal episode i/s/o GI bleed  - Obtain CT Head stat r/o ICH i/s/o Eliquis use  - Continuous telemetry monitoring   - Check orthostatic vitals  - Troponins negative x 2  - Obtain TTE

## 2025-06-05 NOTE — H&P ADULT - PROBLEM SELECTOR PLAN 8
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

## 2025-06-06 DIAGNOSIS — I61.9 NONTRAUMATIC INTRACEREBRAL HEMORRHAGE, UNSPECIFIED: ICD-10-CM

## 2025-06-06 LAB
ANION GAP SERPL CALC-SCNC: 10 MMOL/L — SIGNIFICANT CHANGE UP (ref 7–14)
BUN SERPL-MCNC: 13 MG/DL — SIGNIFICANT CHANGE UP (ref 7–23)
CALCIUM SERPL-MCNC: 9.5 MG/DL — SIGNIFICANT CHANGE UP (ref 8.4–10.5)
CHLORIDE SERPL-SCNC: 107 MMOL/L — SIGNIFICANT CHANGE UP (ref 98–107)
CO2 SERPL-SCNC: 26 MMOL/L — SIGNIFICANT CHANGE UP (ref 22–31)
CREAT SERPL-MCNC: 0.98 MG/DL — SIGNIFICANT CHANGE UP (ref 0.5–1.3)
EGFR: 57 ML/MIN/1.73M2 — LOW
EGFR: 57 ML/MIN/1.73M2 — LOW
GLUCOSE BLDC GLUCOMTR-MCNC: 106 MG/DL — HIGH (ref 70–99)
GLUCOSE SERPL-MCNC: 97 MG/DL — SIGNIFICANT CHANGE UP (ref 70–99)
HCT VFR BLD CALC: 38.2 % — SIGNIFICANT CHANGE UP (ref 34.5–45)
HGB BLD-MCNC: 12.4 G/DL — SIGNIFICANT CHANGE UP (ref 11.5–15.5)
MCHC RBC-ENTMCNC: 28.5 PG — SIGNIFICANT CHANGE UP (ref 27–34)
MCHC RBC-ENTMCNC: 32.5 G/DL — SIGNIFICANT CHANGE UP (ref 32–36)
MCV RBC AUTO: 87.8 FL — SIGNIFICANT CHANGE UP (ref 80–100)
NRBC # BLD AUTO: 0 K/UL — SIGNIFICANT CHANGE UP (ref 0–0)
NRBC # FLD: 0 K/UL — SIGNIFICANT CHANGE UP (ref 0–0)
NRBC BLD AUTO-RTO: 0 /100 WBCS — SIGNIFICANT CHANGE UP (ref 0–0)
PLATELET # BLD AUTO: 153 K/UL — SIGNIFICANT CHANGE UP (ref 150–400)
POTASSIUM SERPL-MCNC: 3.3 MMOL/L — LOW (ref 3.5–5.3)
POTASSIUM SERPL-SCNC: 3.3 MMOL/L — LOW (ref 3.5–5.3)
RBC # BLD: 4.35 M/UL — SIGNIFICANT CHANGE UP (ref 3.8–5.2)
RBC # FLD: 14.5 % — SIGNIFICANT CHANGE UP (ref 10.3–14.5)
SODIUM SERPL-SCNC: 143 MMOL/L — SIGNIFICANT CHANGE UP (ref 135–145)
WBC # BLD: 8.6 K/UL — SIGNIFICANT CHANGE UP (ref 3.8–10.5)
WBC # FLD AUTO: 8.6 K/UL — SIGNIFICANT CHANGE UP (ref 3.8–10.5)

## 2025-06-06 PROCEDURE — 70450 CT HEAD/BRAIN W/O DYE: CPT | Mod: 26

## 2025-06-06 PROCEDURE — 99233 SBSQ HOSP IP/OBS HIGH 50: CPT

## 2025-06-06 RX ADMIN — Medication 500 MILLIGRAM(S): at 05:16

## 2025-06-06 RX ADMIN — ATORVASTATIN CALCIUM 40 MILLIGRAM(S): 80 TABLET, FILM COATED ORAL at 21:08

## 2025-06-06 RX ADMIN — POLYETHYLENE GLYCOL 3350 17 GRAM(S): 17 POWDER, FOR SOLUTION ORAL at 18:24

## 2025-06-06 RX ADMIN — POLYETHYLENE GLYCOL 3350 17 GRAM(S): 17 POWDER, FOR SOLUTION ORAL at 05:16

## 2025-06-06 RX ADMIN — Medication 40 MILLIGRAM(S): at 05:16

## 2025-06-06 RX ADMIN — Medication 40 MILLIEQUIVALENT(S): at 10:16

## 2025-06-06 RX ADMIN — LACOSAMIDE 100 MILLIGRAM(S): 150 TABLET, FILM COATED ORAL at 18:27

## 2025-06-06 RX ADMIN — LACOSAMIDE 100 MILLIGRAM(S): 150 TABLET, FILM COATED ORAL at 05:16

## 2025-06-06 NOTE — DISCHARGE NOTE PROVIDER - CARE PROVIDERS DIRECT ADDRESSES
,[email or web address]

## 2025-06-06 NOTE — DISCHARGE NOTE PROVIDER - NSDCFUADDAPPT_GEN_ALL_CORE_FT
follow up outpatient with Neurosurgeon Dr. Pool in 1-2 weeks from discharge   follow up outpatient with Neurosurgeon Dr. Pool in 1-2 weeks from discharge    Patient should follow up with Stroke NP, Luli Gorman or Eli Crouch, in clinic at [address] [place] [address].

## 2025-06-06 NOTE — DISCHARGE NOTE PROVIDER - HOSPITAL COURSE
HPI:  85 y/o female with PMH of CVA in 2020 (non-verbal at baseline), A-fib on Eliquis, HTN, HLD, and seizure disorder presented to the ED c/o 1 episode of large, dark red blood per rectum. The pt subsequently lost consciousness for about a minute, as witnessed and reported by pt's daughters. Family then called EMS. Per EMS, blood pressure 60/40s in field -->112/64 upon ED arrival with HR in 60s. Of note, pt last took Eliquis 5 mg yesterday morning.     In the ED, VSS. Hgb resulted 13.2 (hgb 14.1 on 5/16/2025). She received 1U pRBC for symptomatic, acute blood loss anemia.   CTA Abdomen/Pelvis reviewed: Rectal wall thickening with perirectal edema. Findings suggests proctitis. There is no CT evidence of active GI bleeding.  Pt recieved 1 L NS bolus, Protonix 80 mg IV x 1, and 1 dose of CTX and Flagyl in the ER.       (05 Jun 2025 10:31)    Hospital Course:  #Acute left cerebellar parenchymal hemorrhage  #Acute right cerebellar infarct  -CTH: "There is acute parenchymal hemorrhage with some surrounding edema seen involving the left cerebellar region which measures approximately 3.5 x 2.9 x 3.0 cm. This finding does cause localized mass effect. No significant shift or herniation is seen."  -s/p PCC on 6/5  -NSGY consulted, repeat CTH stable, advised to hold antiplatelets, anticoagulation, DVT ppx, and any other blood thinners for 2 weeks, repeat CTH before restarting, and repeating CTH within 48h after if restarting eliquis.   -Repeat CTH: "No change since 10:47 AM. Left superior cerebellar parenchymal hemorrhage with mild vasogenic edema. Lucency right cerebellum suspicious for recent infarct. Old left frontal and right parietal cortical infarcts. Left M1/M2 stent."  -Neurology consulted for infarct    #Syncope  Acute syncopal episode, likely in setting of GIB and hypotension (now resolved)  -Syncope workup: trops negative x2, Monitoring on Telemetry, TTE WNL, orthostatics pending stable CTH and NSGY clearance (currently on bedrest)    #Acute GI bleeding  - Pt presented to the ED c/o 1 episode of large dark red blood clot per ER note, BRBPR per other notes  - CTA A/P: "Rectal wall thickening with perirectal edema. Findings suggests proctitis. There is no CT evidence of active GI bleeding."  - GI consulted, now signed off: DDx includes rectal ulcer vs stercoral colitis vs diverticular. Continue bowel regimen.  - Initially on cipro/flagyl which was discontinued  - Discussed eliquis with dtr on 6/6- given risks with bleeding, will discontinue indefinitely    #RJ (acute kidney injury)  - Elevated BUN / Cr: 18/1.47 on admission, unknown baseline Cr  - S/p 1 L NS bolus in the ER -> Cr downtrended to 0.9. Likely prerenal  Improved HPI:  83 y/o female with PMH of CVA in 2020 (non-verbal at baseline), A-fib on Eliquis, HTN, HLD, and seizure disorder presented to the ED c/o 1 episode of large, dark red blood per rectum. The pt subsequently lost consciousness for about a minute, as witnessed and reported by pt's daughters. Family then called EMS. Per EMS, blood pressure 60/40s in field -->112/64 upon ED arrival with HR in 60s. Of note, pt last took Eliquis 5 mg yesterday morning.     In the ED, VSS. Hgb resulted 13.2 (hgb 14.1 on 5/16/2025). She received 1U pRBC for symptomatic, acute blood loss anemia.   CTA Abdomen/Pelvis reviewed: Rectal wall thickening with perirectal edema. Findings suggests proctitis. There is no CT evidence of active GI bleeding.  Pt recieved 1 L NS bolus, Protonix 80 mg IV x 1, and 1 dose of CTX and Flagyl in the ER.       (05 Jun 2025 10:31)    Hospital Course:  Acute left cerebellar parenchymal hemorrhage  Acute right cerebellar infarct  -CTH: "There is acute parenchymal hemorrhage with some surrounding edema seen involving the left cerebellar region which measures approximately 3.5 x 2.9 x 3.0 cm. This finding does cause localized mass effect. No significant shift or herniation is seen."  -s/p PCC on 6/5  -NSGY consulted, repeat CTH stable, advised to hold antiplatelets, anticoagulation, DVT ppx, and any other blood thinners for 2 weeks, repeat CTH before restarting, and repeating CTH within 48h after if restarting eliquis.   -Repeat CTH: "No change since 10:47 AM. Left superior cerebellar parenchymal hemorrhage with mild vasogenic edema. Lucency right cerebellum suspicious for recent infarct. Old left frontal and right parietal cortical infarcts. Left M1/M2 stent."  -Neurology has been evaluating patient, repeated CT and assessed MRI, given no further evolution on CT, rec resuming ASA; plan to repeat MRI in 4-6 weeks and replace ASA w Eliquis   -EP had evaluated patient for possible Watchman, but stated she is high risk and without plans for Watchman at this time     Syncope  Acute syncopal episode, likely in setting of GIB and hypotension (now resolved)  -Syncope workup: trops negative x2, Monitoring on Telemetry, TTE WNL, orthostatics pending stable CTH and NSGY clearance (currently on bedrest)    Acute GI bleeding  - Pt presented to the ED c/o 1 episode of large dark red blood clot per ER note, BRBPR per other notes  - CTA A/P: "Rectal wall thickening with perirectal edema. Findings suggests proctitis. There is no CT evidence of active GI bleeding."  - GI consulted, now signed off: DDx includes rectal ulcer vs stercoral colitis vs diverticular. Continue bowel regimen.  - Initially on cipro/flagyl which was discontinued  - Discussed eliquis with dtr on 6/6- given risks with bleeding, was discontinued  - As ASA restarted, no further bleeding, H/H stable     RJ (acute kidney injury)  - Elevated BUN / Cr: 18/1.47 on admission, unknown baseline Cr  - S/p 1 L NS bolus in the ER -> Cr downtrended to 0.9. Likely prerenal  Improved    Chronic atrial fibrillation  - Initially home meds held, resumed on Metoprolol 25mg BID  - Eliquis resumption in 4-6 weeks OP    Important Medication Changes and Reason:  Metoprolol 25mg BID  Eliquis held, ASA continued    Active or Pending Issues Requiring Follow-up:  F/U Neuro, PCP, Cardio    Advanced Directives:   [ ] Full code  [x] DNR  [ ] Hospice    Discharge Diagnoses:  Acute left cerebellar parenchymal hemorrhage  Acute right cerebellar infarct  Syncope  Acute GI bleeding, likely lower  RJ (acute kidney injury)  Chronic atrial fibrillation  Hypertension  Hyperlipidemia  Seizure disorder  Prior CVA

## 2025-06-06 NOTE — DISCHARGE NOTE PROVIDER - ATTENDING DISCHARGE PHYSICAL EXAMINATION:
CONSTITUTIONAL: NAD  EYES:  non-icteric  NECK: Supple  RESP: No respiratory distress  CV: RRR  GI: Soft, ND  MSK: No gross deformity   NEURO: nonverbal

## 2025-06-06 NOTE — DISCHARGE NOTE PROVIDER - CARE PROVIDER_API CALL
Ang Pool  Neurosurgery  [address], [address]  [place] [address]  Phone: [phone]  Fax: [phone]  Follow Up Time:    Ang Pool  Neurosurgery  [address], [address]  [place] [address]  Phone: [phone]  Fax: [phone]  Follow Up Time:     Luli Gorman  NP in Family Health  [address], [address]  [place] [address]  Phone: [phone]  Fax: [phone]  Follow Up Time:

## 2025-06-06 NOTE — CHART NOTE - NSCHARTNOTEFT_GEN_A_CORE
Rpt 24h CTH reviewed, IPH appears stable, pending official read. No neurosurgical intervention at this time. Neurosurgery signing off. Reconsult PRN. Pt can follow up outpatient with Dr. Pool in 1-2 weeks from discharge. Case d/w attending.    Benefit of starting anticoagulation therapy is to be determined by covering primary team. Patient is at risk of increasing the size of their intracerebral hemorrhage (ICH), SDH, EDH, IVH if they are restarted on anticoagulation, which could increase morbidity/mortality.  Our recommendation is to hold baby/full ASA, Plavix, Coumadin, Eliquis, therapeutic dose of Lovenox or Heparin or any other blood thinners for 2 weeks, and get f/u CT head before restarting. If restarting eliquis, get rpt CTH within 48 hours. Reconsult neurosurgery for any change on CT head prior to restarting A/C.     Pager [phone]   Case discussed with attending neurosurgeon Dr. Padilla

## 2025-06-06 NOTE — DISCHARGE NOTE PROVIDER - PROVIDER TOKENS
PROVIDER:[TOKEN:[[id number]]] PROVIDER:[TOKEN:[[id number]]],PROVIDER:[TOKEN:[[id number]]]

## 2025-06-06 NOTE — CHART NOTE - NSCHARTNOTEFT_GEN_A_CORE
Rpt CTH reviewed, bleed is stable. Please get a RPT CTH 24 hrs from first scan - approx 3pm today         < from: CT Angio Head w/ IV Cont (06.05.25 @ 23:21) >    IMPRESSION: Stable parenchymal hemorrhage    Area of low-attenuation on the right cerebellar region which could be   compatible with an acute infarct.    Old infarcts again seen and unchanged    CTA of the neck demonstrates no significant stenosis.    CTA of the [place] of [place] demonstrates evidence of a left-sided stent   with short segment of decreased flow related signal seen involving the   region of the stent.

## 2025-06-06 NOTE — DISCHARGE NOTE PROVIDER - NSDCCPCAREPLAN_GEN_ALL_CORE_FT
PRINCIPAL DISCHARGE DIAGNOSIS  Diagnosis: Syncope  Assessment and Plan of Treatment: You presented to the hospital after an episode of losing consciousness. You had imaging of your brain which showed an acute bleed as well as findings consistent with an acute infarct. Our Neurosurgeons and Neurologists evaluated you for your strokes. We discontinued all medications that could increase your risk for further bleeding.   ***      SECONDARY DISCHARGE DIAGNOSES  Diagnosis: Acute GI bleeding  Assessment and Plan of Treatment: You presented to the hospital after an episode of bleeding from your bottom. You had a CT scan of your abdomen and pelvis which showed no active bleed. Our GI specialists evaluated you and did not recommend any procedure to visualize or evaluate further (due to your acute stroke). You were started on stool softeners to prevent constipation.    Diagnosis: RJ (acute kidney injury)  Assessment and Plan of Treatment: You had some slowing of your kidneys which improved with IV fluids. This was likely due to mild dehydration. On discharge, make sure you eat and drink enough to prevent dehydration.     PRINCIPAL DISCHARGE DIAGNOSIS  Diagnosis: Syncope  Assessment and Plan of Treatment: You presented to the hospital after an episode of losing consciousness. You had imaging of your brain which showed an acute bleed as well as findings consistent with an acute infarct. Our Neurosurgeons and Neurologists evaluated you for your strokes. Initially we held all medications that would contribute to thinning blood. After multiple subsequent images of the head/brain, we resumed aspirin. Further adjustments will be made in 4-6 weeks after further brain imaging.      SECONDARY DISCHARGE DIAGNOSES  Diagnosis: Acute GI bleeding  Assessment and Plan of Treatment: You presented to the hospital after an episode of bleeding from your bottom. You had a CT scan of your abdomen and pelvis which showed no active bleed. Our GI specialists evaluated you and did not recommend any procedure to visualize or evaluate further (due to your acute stroke). You were started on stool softeners to prevent constipation.    Diagnosis: RJ (acute kidney injury)  Assessment and Plan of Treatment: You had some slowing of your kidneys which improved with IV fluids. This was likely due to mild dehydration. On discharge, make sure you eat and drink enough to prevent dehydration.

## 2025-06-06 NOTE — DISCHARGE NOTE PROVIDER - NSDCCPTREATMENT_GEN_ALL_CORE_FT
PRINCIPAL PROCEDURE  Procedure: CT head  Findings and Treatment: COMPARISON: None.  Multiple axial sections were performed from the base of skull to vertex   without contrast enhancement. Coronal and sagittal reconstructions were   performed  Parenchymal volume loss and chronic microvessel ischemic changes are   identified  There is acute parenchymal hemorrhage with some surrounding edema seen  involving the left cerebellar region which measures approximately 3.5 x   2.9 x 3.0 cm. This finding does cause localized mass effect. No   significant shift or herniation is seen.  Areas of encephalomalacia and gliosis involving the left [place]   right posterior frontal parietal region are identified. This compatible   old infarcts. Stent is seen involving the left middle cerebral artery   with area of embolic material also suspected. Evaluation of the osseous   structures with the appropriate window appears unremarkable  IMPRESSION: Acute parenchymal hemorrhage is identified.        SECONDARY PROCEDURE  Procedure: Complete transthoracic echocardiography (TTE)  Findings and Treatment: CONCLUSIONS:      1. Left ventricular cavity is small. Left ventricular wall thickness is normal. Left ventricular systolic function is normal with an ejection fraction visually estimated at 60 to 65 %. There are no regional wall motion abnormalities seen.   2. Normal right ventricular cavity size and probably normal right ventricular systolic function.   3. Structurally normal mitral valve with normal leaflet excursion. There is calcification of the mitral valve annulus. There is trace mitral regurgitation.   4. The left atrium is moderately dilated with an indexed volume of 42.80 ml/m².    Procedure: CTA head w/w/o contrast  Findings and Treatment: IMPRESSION: Stable parenchymal hemorrhage  Area of low-attenuation on the right cerebellar region which could be   compatible with an acute infarct.  Old infarcts again seen and unchanged  CTA of the neck demonstrates no significant stenosis.  CTA of the [place] demonstrates evidence of a left-sided stent   with short segment of decreased flow related signal seen involving the   region of the stent.    Procedure: CT abdomen and pelvis  Findings and Treatment: FINDINGS:  LOWER CHEST: Theheart is enlarged. Valvular, coronary and aortic   calcifications are noted. Dependent bibasilar atelectasis is appreciated..  LIVER: Subcentimeter hypodensity of the right hepatic lobe is too small   to characterize. The liver is otherwise unremarkable.  BILE DUCTS: Normal caliber.  GALLBLADDER: Within normal limits.  SPLEEN: Within normal limits.  PANCREAS: Within normal limits.  ADRENALS: Within normal limits.  KIDNEYS/URETERS: There is a large right renal cyst measuring 15.8 x 11.3   x 14.3cm. Additional smaller bilateral renal cysts are noted as well as   subcentimeter hypodensities which are too small to accurately   characterize. There is a left renal scarring. There is a fatty density   subcentimeter lesion of the left kidney, compatible with an   angiomyolipoma. There is no urinary tract obstruction.  BLADDER: Within normal limits.  REPRODUCTIVE ORGANS: Uterus and adnexa are unremarkable.  BOWEL: Diffuse wall thickening of the rectum. There is adjacent   perirectal fat stranding. There is no CT evidence of active GI bleeding   at the time scanning. There is colonic diverticulosis. There is no focal   diverticulitis. There is a small hiatal hernia.  PERITONEUM/RETROPERITONEUM: Within normal limits.  VESSELS: Atherosclerotic changes.  LYMPH NODES: No lymphadenopathy.  ABDOMINAL WALL: Thinning and laxity of the right anterior abdominal wall.  BONES: Degenerative changes. Chronic appearing vertebral body height loss   throughout the spine..  IMPRESSION:  Rectal wall thickening with perirectal edema. Findings suggests   proctitis. There is no CT evidence of active GI bleeding.  Dominant large right renal cyst.

## 2025-06-06 NOTE — CONSULT NOTE ADULT - ASSESSMENT
85 y/o female with PMH of CVA in 2020 (non-verbal and bedbound at baseline- from prior stroke), A-fib on Eliquis, HTN, HLD, and seizure disorder presented to the ED  on 6/4/25 c/o 1 episode of large, dark red blood per rectum. The pt subsequently lost consciousness for about a minute, as witnessed and reported by pt's daughters.  She was brought into the ED and CTH was done for syncope to "rule out ICH."  CTH showed ICH and neurosurgery was consulted, they recommended for her eliquis to be held, and it was reversed with Kcentra.  Neurology consulted after repeat CTH showed an area of low-attenuation right cerebellar region which could be compatible with an acute infarct.  All history obtained from chart review.      LKW: Unknown   NIHSS:  21 (+2 LOC, +2 questions, +2 tasks, +2 LUE, +2 RUE, +3 RLE, +3 LLE, +3 language, +2 dysarthria)   Baseline MRS: 5    Impression:  Presented initially to the ED after concern for GI bleed and lost consciousness, CTH showed cerebellar ICH, and then repeat CTH concern for acute r cerebellar infarct, mechanism likely cardioembolic from afib.      Recommendations:  [] Hold AC for now, at least 4 weeks, until cleared by neurosurgery and GI  [] Hold aspirin for at least 4 days, until cleared by neurosurgery and GI   [] Consider watchman   [] Atorvastatin 40 mg daily titrated per LDL < 70  [] Please send: HgbA1C, fasting lipid panel, CBC, CMP, coag panel, troponin  [] MRI brain w/wout con  [x] TTE, results as noted above.   [] Tight glucose control (long-term goal HgbA1c < 6%)  [] Stroke education and counseling  [] Stroke neuro-checks and VS q4h  [] Blood pressure goals <140/90   [] Dysphagia screen. If fails, speech/swallow eval  [] aspiration and fall precautions  []  STAT CT head non-contrast for change in neuro exam  [] PT/ OT evaluations   [] DVT ppx: SCDs     Discussed with stroke fellow Dr. Peterson and under supervision of attending Dr. Hirsch.       Please call with questions: [phone]  83 y/o lady with PMH of stroke in 2020 (non-verbal and bedbound at baseline - from prior stroke), A-fib on apixaban, HTN, HLD, and seizure disorder presented to the ED on 6/4/25 c/o 1 episode of large, dark red blood per rectum. The pt subsequently lost consciousness for about a minute, as witnessed and reported by pt's daughters.  She was brought into the ED and CTH was done for syncope to "rule out ICH."  CTH showed ICH in the left cerebeullum and neurosurgery was consulted, they recommended for her apixaban to be held, and it was reversed with PCC.  Neurology consulted after repeat CTH showed an area of low-attenuation right cerebellar region which could be compatible with an acute infarct.  All history obtained from chart review.      LKW: Unknown   NIHSS:  21 (+2 LOC, +2 questions, +2 tasks, +2 LUE, +2 RUE, +3 RLE, +3 LLE, +3 language, +2 dysarthria)   Baseline MRS: 5    Impression:  Presented initially to the ED after concern for GI bleed and lost consciousness, CTH showed cerebellar ICH, and then repeat CTH concern for acute R cerebellar infarct. Mechanism if ICH likely related to anticoagulation (possibly with component of HTN, but hypotensive in the field). Mechanism of acute R cerebellar infarct may be cardioembolic d/t Afib.    Recommendations:  [] Hold AC for now, at least 4 weeks, until cleared by neurosurgery and GI  [] Hold aspirin for at least 4 days, until cleared by neurosurgery and GI   [] Consider Watchman   [] Atorvastatin 40 mg daily titrated per LDL < 70  [] Please send: HgbA1C, fasting lipid panel, CBC, CMP, coag panel, troponin  [] MRI brain w/wout con  [x] TTE, results as noted above.   [] Tight glucose control (long-term goal HgbA1c < 7%)  [] Stroke education and counseling  [] Stroke neuro-checks and VS q4h  [] Blood pressure goals <140/90   [] Dysphagia screen. If fails, speech/swallow eval  [] aspiration and fall precautions  [] STAT CT head non-contrast for change in neuro exam  [] PT/ OT evaluations   [] DVT ppx: SCDs     Discussed with stroke fellow Dr. Peterson and under supervision of attending Dr. Hirsch.       Please call with questions: [phone]  85 y/o lady with PMH of stroke in 2020 (non-verbal and bedbound at baseline - from prior stroke), A-fib on apixaban, HTN, HLD, and seizure disorder presented to the ED on 6/4/25 c/o 1 episode of large, dark red blood per rectum. The pt subsequently lost consciousness for about a minute, as witnessed and reported by pt's daughters.  She was brought into the ED and CTH was done for syncope to "rule out ICH."  CTH showed ICH in the left cerebeullum and neurosurgery was consulted, they recommended for her apixaban to be held, and it was reversed with PCC.  Neurology consulted after repeat CTH showed an area of low-attenuation right cerebellar region which could be compatible with an acute infarct.  All history obtained from chart review.      LKW: Unknown   NIHSS:  21 (+2 LOC, +2 questions, +2 tasks, +2 LUE, +2 RUE, +3 RLE, +3 LLE, +3 language, +2 dysarthria)   Baseline MRS: 5  No tenecteplase d/t outside therapeutic window.  No thrombectomy d/t no ELVO.    Impression:  Presented initially to the ED after concern for GI bleed and loss of consciousness, CTH showed LEFT cerebellar ICH, and then repeat CTH concern for acute R cerebellar infarct. Mechanism if ICH likely related to anticoagulation (possibly with component of HTN). Mechanism of acute R cerebellar infarct may be cardioembolic d/t Afib.    Recommendations:  [] Hold AC for now, at least 4 weeks, until cleared by neurosurgery and GI  [] Hold aspirin for at least 4 days, until cleared by neurosurgery and GI   [] Consider Watchman   [] Atorvastatin 40 mg daily titrated per LDL < 70  [] Please send: HgbA1C, fasting lipid panel, CBC, CMP, coag panel, troponin  [] MRI brain w/wout con  [x] TTE, results as noted above.   [] Tight glucose control (long-term goal HgbA1c < 7%)  [] Stroke education and counseling  [] Stroke neuro-checks and VS q4h  [] Blood pressure goals <140/90   [] Dysphagia screen. If fails, speech/swallow eval  [] aspiration and fall precautions  [] STAT CT head non-contrast for change in neuro exam  [] PT/ OT evaluations   [] DVT ppx: SCDs     Discussed with stroke fellow Dr. Peterson and under supervision of attending Dr. Hirsch.       Please call with questions: [phone]

## 2025-06-06 NOTE — DISCHARGE NOTE PROVIDER - NSDCMRMEDTOKEN_GEN_ALL_CORE_FT
atorvastatin 40 mg oral tablet: 1 tab(s) orally once a day (at bedtime)  Eliquis 5 mg oral tablet: 1 tab(s) orally 2 times a day  enalapril 20 mg oral tablet: 1 tab(s) orally once a day  hydroCHLOROthiazide 25 mg oral tablet: 1 tab(s) orally once a day  lacosamide 100 mg oral tablet: 1 tab(s) orally 2 times a day  metoprolol tartrate 50 mg oral tablet: 1 tab(s) orally 2 times a day   atorvastatin 40 mg oral tablet: 1 tab(s) orally once a day (at bedtime)  atorvastatin 40 mg oral tablet: 1 tab(s) orally once a day (at bedtime)  Eliquis 5 mg oral tablet: 1 tab(s) orally 2 times a day  enalapril 20 mg oral tablet: 1 tab(s) orally once a day  hydroCHLOROthiazide 25 mg oral tablet: 1 tab(s) orally once a day  lacosamide 100 mg oral tablet: 1 tab(s) orally 2 times a day  metoprolol tartrate 25 mg oral tablet: 1 tab(s) orally 2 times a day  metoprolol tartrate 50 mg oral tablet: 1 tab(s) orally 2 times a day  polyethylene glycol 3350 oral powder for reconstitution: 17 gram(s) orally 2 times a day  senna leaf extract oral tablet: 2 tab(s) orally once a day (at bedtime) As needed Constipation   aspirin 81 mg oral tablet, chewable: 1 tab(s) orally once a day  atorvastatin 40 mg oral tablet: 1 tab(s) orally once a day (at bedtime)  atorvastatin 40 mg oral tablet: 1 tab(s) orally once a day (at bedtime)  enalapril 20 mg oral tablet: 1 tab(s) orally once a day  lacosamide 100 mg oral tablet: 1 tab(s) orally 2 times a day  metoprolol tartrate 25 mg oral tablet: 1 tab(s) orally 2 times a day  polyethylene glycol 3350 oral powder for reconstitution: 17 gram(s) orally 2 times a day  senna leaf extract oral tablet: 2 tab(s) orally once a day (at bedtime) As needed Constipation

## 2025-06-06 NOTE — CONSULT NOTE ADULT - SUBJECTIVE AND OBJECTIVE BOX
HPI: 85 y/o female with PMH of CVA in 2020 (non-verbal at baseline), A-fib on Eliquis, HTN, HLD, and seizure disorder presented to the ED  on 6/4/25 c/o 1 episode of large, dark red blood per rectum. The pt subsequently lost consciousness for about a minute, as witnessed and reported by pt's daughters. Family then called EMS. Per EMS, blood pressure 60/40s in field -->112/64 upon ED arrival with HR in 60s.       NIHSS:   MRS:   ICH:     A 10-system ROS was performed and is negative except for those items noted above and/or in the HPI.    PAST MEDICAL & SURGICAL HISTORY:  Afib  H/O: CVA (cerebrovascular accident)  No significant past surgical history      FAMILY HISTORY:    SOCIAL HISTORY:   T/E/D:   Occupation:   Lives with:     MEDICATIONS (HOME):  Home Medications:  atorvastatin 40 mg oral tablet: 1 tab(s) orally once a day (at bedtime) (05 Jun 2025 10:37)  Eliquis 5 mg oral tablet: 1 tab(s) orally 2 times a day (05 Jun 2025 10:35)  enalapril 20 mg oral tablet: 1 tab(s) orally once a day (05 Jun 2025 10:37)  hydroCHLOROthiazide 25 mg oral tablet: 1 tab(s) orally once a day (05 Jun 2025 10:38)  lacosamide 100 mg oral tablet: 1 tab(s) orally 2 times a day (05 Jun 2025 10:40)  metoprolol tartrate 50 mg oral tablet: 1 tab(s) orally 2 times a day (05 Jun 2025 10:36)    MEDICATIONS  (STANDING):  atorvastatin 40 milliGRAM(s) Oral at bedtime  lacosamide 100 milliGRAM(s) Oral two times a day  polyethylene glycol 3350 17 Gram(s) Oral two times a day    MEDICATIONS  (PRN):  senna 2 Tablet(s) Oral at bedtime PRN Constipation    ALLERGIES/INTOLERANCES:  Allergies  No Known Allergies    Intolerances    VITALS & EXAMINATION:  Vital Signs Last 24 Hrs  T(C): 36.7 (06 Jun 2025 13:03), Max: 36.9 (05 Jun 2025 22:13)  T(F): 98 (06 Jun 2025 13:03), Max: 98.4 (05 Jun 2025 22:13)  HR: 94 (06 Jun 2025 13:03) (80 - 99)  BP: 132/74 (06 Jun 2025 13:03) (132/74 - 151/94)  RR: 18 (06 Jun 2025 13:03) (18 - 18)  SpO2: 100% (06 Jun 2025 13:03) (95% - 100%)    Parameters below as of 06 Jun 2025 13:03  Patient On (Oxygen Delivery Method): room air    General:  Constitutional: Female, appears stated age, in no apparent distress including pain  Head: Normocephalic & Atraumatic.  Respiratory: Breathing comfortably.  Extremities: No cyanosis, clubbing, or edema    Neurological:  MS: Awake, alert, oriented to person, place, situation, time. Follows all commands.  Language: Speech is clear, fluent with good repetition & comprehension.  CNs: PERRL. VFF. EOMI no nystagmus. V1-3 intact to LT b/l. No facial asymmetry b/l, full eye closure strength b/l. Hearing grossly normal (rubbing fingers) b/l. Tongue midline, normal movements, no atrophy.   Motor: Normal muscle bulk & tone. No noticeable tremor. No pronator drift.	   Sensation: Intact to LT b/l throughout.   Cortical: Extinction on DSS (neglect): none  Coordination: intact rapid-alt movements. No dysmetria to FTN/HTS  Gait:     LABORATORY:  CBC                       12.4   8.60  )-----------( 153      ( 06 Jun 2025 05:38 )             38.2     Chem 06-06    143  |  107  |  13  ----------------------------<  97  3.3[L]   |  26  |  0.98    Ca    9.5      06 Jun 2025 05:38    TPro  7.2  /  Alb  3.5  /  TBili  0.4  /  DBili  x   /  AST  17  /  ALT  12  /  AlkPhos  120  06-04    LFTs LIVER FUNCTIONS - ( 04 Jun 2025 23:05 )  Alb: 3.5 g/dL / Pro: 7.2 g/dL / ALK PHOS: 120 U/L / ALT: 12 U/L / AST: 17 U/L / GGT: x           Coagulopathy PT/INR - ( 05 Jun 2025 21:07 )   PT: 12.8 sec;   INR: 1.10 ratio         PTT - ( 05 Jun 2025 21:07 )  PTT:34.5 sec  Lipid Panel   A1c   Cardiac enzymes     U/A Urinalysis Basic - ( 06 Jun 2025 05:38 )    Color: x / Appearance: x / SG: x / pH: x  Gluc: 97 mg/dL / Ketone: x  / Bili: x / Urobili: x   Blood: x / Protein: x / Nitrite: x   Leuk Esterase: x / RBC: x / WBC x   Sq Epi: x / Non Sq Epi: x / Bacteria: x        Radiology (XR, CT, MR, U/S, TTE/HARITHA):    CTH (6/5/25 @ 1048):  Acute parenchymal hemorrhage is identified.    CTH (6/5/25 @ 1530): No change since 10:47 AM. Left superior cerebellar parenchymal hemorrhage with mild vasogenic edema. Lucency right cerebellum suspicious for recent infarct. Old left frontal and right parietal cortical infarcts. Left M1/M2 stent.    CTH/CTA (6/5/25 @ 2320): Stable parenchymal hemorrhage. Area of low-attenuation on the right cerebellar region which could be compatible with an acute infarct. Old infarcts again seen and unchanged CTA of the neck demonstrates no significant stenosis. CTA of the [place] demonstrates evidence of a left-sided stent with short segment of decreased flow related signal seen involving the region of the stent.    TTE (6/5/25):    1. Left ventricular cavity is small. Left ventricular wall thickness is normal. Left ventricular systolic function is normal with an ejection fraction visually estimated at 60 to 65 %. There are no regional wall motion abnormalities seen.   2. Normal right ventricular cavity size and probably normal right ventricular systolic function.   3. Structurally normal mitral valve with normal leaflet excursion. There is calcification of the mitral valve annulus. There is trace mitral regurgitation.   4. The left atrium is moderately dilated with an indexed volume of 42.80 ml/m². HPI: 83 y/o female with PMH of CVA in 2020 (non-verbal and bedbound at baseline - from prior stroke), A-fib on Eliquis, HTN, HLD, and seizure disorder presented to the ED  on 6/4/25 c/o 1 episode of large, dark red blood per rectum. The pt subsequently lost consciousness for about a minute, as witnessed and reported by pt's daughters.  She was brought into the ED and CTH was done for syncope to "rule out ICH."  CTH showed ICH and neurosurgery was consulted, they recommended for her eliquis to be held, and it was reversed with Kcentra.  Neurology consulted after repeat CTH showed an area of low-attenuation right cerebellar region which could be compatible with an acute infarct.  All history obtained from chart review.      NIHSS: 21 (+2 LOC, +2 questions, +2 tasks, +2 LUE, +2 RUE, +3 RLE, +3 LLE, +3 language, +2 dysarthria)   MRS: 5  ICH: 3    A 10-system ROS was performed and is negative except for those items noted above and/or in the HPI.    PAST MEDICAL & SURGICAL HISTORY:  Afib  H/O: CVA (cerebrovascular accident)  No significant past surgical history    FAMILY HISTORY:    SOCIAL HISTORY:   T/E/D:   Occupation:   Lives with:     MEDICATIONS (HOME):  Home Medications:  atorvastatin 40 mg oral tablet: 1 tab(s) orally once a day (at bedtime) (05 Jun 2025 10:37)  Eliquis 5 mg oral tablet: 1 tab(s) orally 2 times a day (05 Jun 2025 10:35)  enalapril 20 mg oral tablet: 1 tab(s) orally once a day (05 Jun 2025 10:37)  hydroCHLOROthiazide 25 mg oral tablet: 1 tab(s) orally once a day (05 Jun 2025 10:38)  lacosamide 100 mg oral tablet: 1 tab(s) orally 2 times a day (05 Jun 2025 10:40)  metoprolol tartrate 50 mg oral tablet: 1 tab(s) orally 2 times a day (05 Jun 2025 10:36)    MEDICATIONS  (STANDING):  atorvastatin 40 milliGRAM(s) Oral at bedtime  lacosamide 100 milliGRAM(s) Oral two times a day  polyethylene glycol 3350 17 Gram(s) Oral two times a day    MEDICATIONS  (PRN):  senna 2 Tablet(s) Oral at bedtime PRN Constipation    ALLERGIES/INTOLERANCES:  Allergies  No Known Allergies    Intolerances    VITALS & EXAMINATION:  Vital Signs Last 24 Hrs  T(C): 36.7 (06 Jun 2025 13:03), Max: 36.9 (05 Jun 2025 22:13)  T(F): 98 (06 Jun 2025 13:03), Max: 98.4 (05 Jun 2025 22:13)  HR: 94 (06 Jun 2025 13:03) (80 - 99)  BP: 132/74 (06 Jun 2025 13:03) (132/74 - 151/94)  RR: 18 (06 Jun 2025 13:03) (18 - 18)  SpO2: 100% (06 Jun 2025 13:03) (95% - 100%)    Parameters below as of 06 Jun 2025 13:03  Patient On (Oxygen Delivery Method): room air     ID Ruba [id number]   General:  Constitutional: Female, appears stated age, in no apparent distress including pain  Head: Normocephalic & Atraumatic.  Respiratory: Breathing comfortably.  Extremities: No cyanosis, clubbing, or edema    Neurological:  MS: Eyes closed, open briefly to noxious stimuli, then close again. Not following any commands.    Language: No verbal output.    CNs: (right pupil reactive, left pupil minimal to no reactivity) BTT b/l. Eyes midline, cross midline with oculocephalics  No obvious facial asymmetry at rest.    Motor: Normal muscle bulk & tone. No noticeable tremor. B/L UE move spontaneously - antigravity.  B/L LE withdraws to noxious stimuli - some movement in the plane of the bed.    Sensation: Grimaces to noxious stimuli equally all 4 extremities.   Coordination:  Unable to assess.   Gait: Unable to assess.        LABORATORY:  CBC                       12.4   8.60  )-----------( 153      ( 06 Jun 2025 05:38 )             38.2     Chem 06-06    143  |  107  |  13  ----------------------------<  97  3.3[L]   |  26  |  0.98    Ca    9.5      06 Jun 2025 05:38    TPro  7.2  /  Alb  3.5  /  TBili  0.4  /  DBili  x   /  AST  17  /  ALT  12  /  AlkPhos  120  06-04    LFTs LIVER FUNCTIONS - ( 04 Jun 2025 23:05 )  Alb: 3.5 g/dL / Pro: 7.2 g/dL / ALK PHOS: 120 U/L / ALT: 12 U/L / AST: 17 U/L / GGT: x           Coagulopathy PT/INR - ( 05 Jun 2025 21:07 )   PT: 12.8 sec;   INR: 1.10 ratio         PTT - ( 05 Jun 2025 21:07 )  PTT:34.5 sec  Lipid Panel   A1c   Cardiac enzymes     U/A Urinalysis Basic - ( 06 Jun 2025 05:38 )    Color: x / Appearance: x / SG: x / pH: x  Gluc: 97 mg/dL / Ketone: x  / Bili: x / Urobili: x   Blood: x / Protein: x / Nitrite: x   Leuk Esterase: x / RBC: x / WBC x   Sq Epi: x / Non Sq Epi: x / Bacteria: x        Radiology (XR, CT, MR, U/S, TTE/HARITHA):    CTH (6/5/25 @ 1048):  Acute parenchymal hemorrhage is identified.    CTH (6/5/25 @ 1530): No change since 10:47 AM. Left superior cerebellar parenchymal hemorrhage with mild vasogenic edema. Lucency right cerebellum suspicious for recent infarct. Old left frontal and right parietal cortical infarcts. Left M1/M2 stent.    CTH/CTA (6/5/25 @ 2320): Stable parenchymal hemorrhage. Area of low-attenuation on the right cerebellar region which could be compatible with an acute infarct. Old infarcts again seen and unchanged CTA of the neck demonstrates no significant stenosis. CTA of the [place] demonstrates evidence of a left-sided stent with short segment of decreased flow related signal seen involving the region of the stent.    TTE (6/5/25):    1. Left ventricular cavity is small. Left ventricular wall thickness is normal. Left ventricular systolic function is normal with an ejection fraction visually estimated at 60 to 65 %. There are no regional wall motion abnormalities seen.   2. Normal right ventricular cavity size and probably normal right ventricular systolic function.   3. Structurally normal mitral valve with normal leaflet excursion. There is calcification of the mitral valve annulus. There is trace mitral regurgitation.   4. The left atrium is moderately dilated with an indexed volume of 42.80 ml/m². HPI: 83 y/o lady with PMH of stroke in 2020 (non-verbal and bedbound at baseline - from prior stroke), A-fib on apixaban, HTN, HLD, and seizure disorder presented to the ED on 6/4/25 c/o 1 episode of large, dark red blood per rectum. The pt subsequently lost consciousness for about a minute, as witnessed and reported by pt's daughters.  She was brought into the ED and CTH was done for syncope to "rule out ICH."  CTH showed ICH in the left cerebeullum and neurosurgery was consulted, they recommended for her apixaban to be held, and it was reversed with PCC.  Neurology consulted after repeat CTH showed an area of low-attenuation right cerebellar region which could be compatible with an acute infarct.  All history obtained from chart review.      NIHSS: 21 (+2 LOC, +2 questions, +2 tasks, +2 LUE, +2 RUE, +3 RLE, +3 LLE, +3 language, +2 dysarthria)   MRS: 5  ICH: 3    ROS cannot be completed 2/2 mental status.    PAST MEDICAL & SURGICAL HISTORY:  Afib  H/O: CVA (cerebrovascular accident)  No significant past surgical history    FAMILY HISTORY:    SOCIAL HISTORY:   DENISE d/t mental status    MEDICATIONS (HOME):  Home Medications:  atorvastatin 40 mg oral tablet: 1 tab(s) orally once a day (at bedtime) (05 Jun 2025 10:37)  Eliquis 5 mg oral tablet: 1 tab(s) orally 2 times a day (05 Jun 2025 10:35)  enalapril 20 mg oral tablet: 1 tab(s) orally once a day (05 Jun 2025 10:37)  hydroCHLOROthiazide 25 mg oral tablet: 1 tab(s) orally once a day (05 Jun 2025 10:38)  lacosamide 100 mg oral tablet: 1 tab(s) orally 2 times a day (05 Jun 2025 10:40)  metoprolol tartrate 50 mg oral tablet: 1 tab(s) orally 2 times a day (05 Jun 2025 10:36)    MEDICATIONS  (STANDING):  atorvastatin 40 milliGRAM(s) Oral at bedtime  lacosamide 100 milliGRAM(s) Oral two times a day  polyethylene glycol 3350 17 Gram(s) Oral two times a day    MEDICATIONS  (PRN):  senna 2 Tablet(s) Oral at bedtime PRN Constipation    ALLERGIES/INTOLERANCES:  Allergies  No Known Allergies    Intolerances    VITALS & EXAMINATION:  Vital Signs Last 24 Hrs  T(C): 36.7 (06 Jun 2025 13:03), Max: 36.9 (05 Jun 2025 22:13)  T(F): 98 (06 Jun 2025 13:03), Max: 98.4 (05 Jun 2025 22:13)  HR: 94 (06 Jun 2025 13:03) (80 - 99)  BP: 132/74 (06 Jun 2025 13:03) (132/74 - 151/94)  RR: 18 (06 Jun 2025 13:03) (18 - 18)  SpO2: 100% (06 Jun 2025 13:03) (95% - 100%)    Parameters below as of 06 Jun 2025 13:03  Patient On (Oxygen Delivery Method): room air     ID Ruba [id number]   General:  Constitutional: Female, appears stated age, in no apparent distress including pain  Head: Normocephalic & Atraumatic.  Respiratory: Breathing comfortably on RA    Neurological:  MS: Eyes closed, open briefly to noxious central stimuli, then closes again. Not following any commands.    Language: No verbal output.    CNs: Right pupil reactive, left pupil minimal to no reactivity. Appears to BTT b/l. Gaze midline, cross midline with oculocephalics  No obvious facial asymmetry at rest.    Motor: Normal muscle bulk. No noticeable tremor. B/L UE move spontaneously - antigravity - pulls at blanket with both hands.  B/L LE withdraws to noxious stimuli - some movement in the plane of the bed.    Sensation: Grimaces to noxious stimuli equally all 4 extremities.   Coordination:  Unable to assess.   Gait: Unable to assess.        LABORATORY:  CBC                       12.4   8.60  )-----------( 153      ( 06 Jun 2025 05:38 )             38.2     Chem 06-06    143  |  107  |  13  ----------------------------<  97  3.3[L]   |  26  |  0.98    Ca    9.5      06 Jun 2025 05:38    TPro  7.2  /  Alb  3.5  /  TBili  0.4  /  DBili  x   /  AST  17  /  ALT  12  /  AlkPhos  120  06-04    LFTs LIVER FUNCTIONS - ( 04 Jun 2025 23:05 )  Alb: 3.5 g/dL / Pro: 7.2 g/dL / ALK PHOS: 120 U/L / ALT: 12 U/L / AST: 17 U/L / GGT: x           Coagulopathy PT/INR - ( 05 Jun 2025 21:07 )   PT: 12.8 sec;   INR: 1.10 ratio         PTT - ( 05 Jun 2025 21:07 )  PTT:34.5 sec  Lipid Panel   A1c   Cardiac enzymes     U/A Urinalysis Basic - ( 06 Jun 2025 05:38 )    Color: x / Appearance: x / SG: x / pH: x  Gluc: 97 mg/dL / Ketone: x  / Bili: x / Urobili: x   Blood: x / Protein: x / Nitrite: x   Leuk Esterase: x / RBC: x / WBC x   Sq Epi: x / Non Sq Epi: x / Bacteria: x        Radiology (XR, CT, MR, U/S, TTE/HARITHA):    CTH (6/5/25 @ 1048):  Acute parenchymal hemorrhage is identified.    CTH (6/5/25 @ 1530): No change since 10:47 AM. Left superior cerebellar parenchymal hemorrhage with mild vasogenic edema. Lucency right cerebellum suspicious for recent infarct. Old left frontal and right parietal cortical infarcts. Left M1/M2 stent.    CTH/CTA (6/5/25 @ 2320): Stable parenchymal hemorrhage. Area of low-attenuation on the right cerebellar region which could be compatible with an acute infarct. Old infarcts again seen and unchanged CTA of the neck demonstrates no significant stenosis. CTA of the [place] of [place] demonstrates evidence of a left-sided stent with short segment of decreased flow related signal seen involving the region of the stent.    TTE (6/5/25):    1. Left ventricular cavity is small. Left ventricular wall thickness is normal. Left ventricular systolic function is normal with an ejection fraction visually estimated at 60 to 65 %. There are no regional wall motion abnormalities seen.   2. Normal right ventricular cavity size and probably normal right ventricular systolic function.   3. Structurally normal mitral valve with normal leaflet excursion. There is calcification of the mitral valve annulus. There is trace mitral regurgitation.   4. The left atrium is moderately dilated with an indexed volume of 42.80 ml/m². HPI: 85 y/o lady with PMH of stroke in 2020 (non-verbal and bedbound at baseline - from prior stroke), A-fib on apixaban, HTN, HLD, and seizure disorder presented to the ED on 6/4/25 c/o 1 episode of large, dark red blood per rectum. The pt subsequently lost consciousness for about a minute, as witnessed and reported by pt's daughters.  She was brought into the ED and CTH was done for syncope to "rule out ICH."  CTH showed ICH in the left cerebeullum and neurosurgery was consulted, they recommended for her apixaban to be held, and it was reversed with PCC.  Neurology consulted after repeat CTH showed an area of low-attenuation right cerebellar region which could be compatible with an acute infarct.  All history obtained from chart review.      LKW: Unknown   NIHSS:  21 (+2 LOC, +2 questions, +2 tasks, +2 LUE, +2 RUE, +3 RLE, +3 LLE, +3 language, +2 dysarthria)   Baseline MRS: 5    ROS cannot be completed 2/2 mental status.    PAST MEDICAL & SURGICAL HISTORY:  Afib  H/O: CVA (cerebrovascular accident)  No significant past surgical history    FAMILY HISTORY:    SOCIAL HISTORY:   DENISE d/t mental status    MEDICATIONS (HOME):  Home Medications:  atorvastatin 40 mg oral tablet: 1 tab(s) orally once a day (at bedtime) (05 Jun 2025 10:37)  Eliquis 5 mg oral tablet: 1 tab(s) orally 2 times a day (05 Jun 2025 10:35)  enalapril 20 mg oral tablet: 1 tab(s) orally once a day (05 Jun 2025 10:37)  hydroCHLOROthiazide 25 mg oral tablet: 1 tab(s) orally once a day (05 Jun 2025 10:38)  lacosamide 100 mg oral tablet: 1 tab(s) orally 2 times a day (05 Jun 2025 10:40)  metoprolol tartrate 50 mg oral tablet: 1 tab(s) orally 2 times a day (05 Jun 2025 10:36)    MEDICATIONS  (STANDING):  atorvastatin 40 milliGRAM(s) Oral at bedtime  lacosamide 100 milliGRAM(s) Oral two times a day  polyethylene glycol 3350 17 Gram(s) Oral two times a day    MEDICATIONS  (PRN):  senna 2 Tablet(s) Oral at bedtime PRN Constipation    ALLERGIES/INTOLERANCES:  Allergies  No Known Allergies    Intolerances    VITALS & EXAMINATION:  Vital Signs Last 24 Hrs  T(C): 36.7 (06 Jun 2025 13:03), Max: 36.9 (05 Jun 2025 22:13)  T(F): 98 (06 Jun 2025 13:03), Max: 98.4 (05 Jun 2025 22:13)  HR: 94 (06 Jun 2025 13:03) (80 - 99)  BP: 132/74 (06 Jun 2025 13:03) (132/74 - 151/94)  RR: 18 (06 Jun 2025 13:03) (18 - 18)  SpO2: 100% (06 Jun 2025 13:03) (95% - 100%)    Parameters below as of 06 Jun 2025 13:03  Patient On (Oxygen Delivery Method): room air     ID Ruba [id number]   General:  Constitutional: Female, appears stated age, in no apparent distress including pain  Head: Normocephalic & Atraumatic.  Respiratory: Breathing comfortably on RA    Neurological:  MS: Eyes closed, open briefly to noxious central stimuli, then closes again. Not following any commands.    Language: No verbal output.    CNs: Right pupil reactive, left pupil minimal to no reactivity. Appears to BTT b/l. Gaze midline, cross midline with oculocephalics  No obvious facial asymmetry at rest.    Motor: Normal muscle bulk. No noticeable tremor. B/L UE move spontaneously - antigravity - pulls at blanket with both hands.  B/L LE withdraws to noxious stimuli - some movement in the plane of the bed.    Sensation: Grimaces to noxious stimuli equally all 4 extremities.   Coordination:  Unable to assess.   Gait: Unable to assess.        LABORATORY:  CBC                       12.4   8.60  )-----------( 153      ( 06 Jun 2025 05:38 )             38.2     Chem 06-06    143  |  107  |  13  ----------------------------<  97  3.3[L]   |  26  |  0.98    Ca    9.5      06 Jun 2025 05:38    TPro  7.2  /  Alb  3.5  /  TBili  0.4  /  DBili  x   /  AST  17  /  ALT  12  /  AlkPhos  120  06-04    LFTs LIVER FUNCTIONS - ( 04 Jun 2025 23:05 )  Alb: 3.5 g/dL / Pro: 7.2 g/dL / ALK PHOS: 120 U/L / ALT: 12 U/L / AST: 17 U/L / GGT: x           Coagulopathy PT/INR - ( 05 Jun 2025 21:07 )   PT: 12.8 sec;   INR: 1.10 ratio         PTT - ( 05 Jun 2025 21:07 )  PTT:34.5 sec  Lipid Panel   A1c   Cardiac enzymes     U/A Urinalysis Basic - ( 06 Jun 2025 05:38 )    Color: x / Appearance: x / SG: x / pH: x  Gluc: 97 mg/dL / Ketone: x  / Bili: x / Urobili: x   Blood: x / Protein: x / Nitrite: x   Leuk Esterase: x / RBC: x / WBC x   Sq Epi: x / Non Sq Epi: x / Bacteria: x        Radiology (XR, CT, MR, U/S, TTE/HARITHA):    CTH (6/5/25 @ 1048):  Acute parenchymal hemorrhage is identified.    CTH (6/5/25 @ 1530): No change since 10:47 AM. Left superior cerebellar parenchymal hemorrhage with mild vasogenic edema. Lucency right cerebellum suspicious for recent infarct. Old left frontal and right parietal cortical infarcts. Left M1/M2 stent.    CTH/CTA (6/5/25 @ 2320): Stable parenchymal hemorrhage. Area of low-attenuation on the right cerebellar region which could be compatible with an acute infarct. Old infarcts again seen and unchanged CTA of the neck demonstrates no significant stenosis. CTA of the [place] demonstrates evidence of a left-sided stent with short segment of decreased flow related signal seen involving the region of the stent.    TTE (6/5/25):    1. Left ventricular cavity is small. Left ventricular wall thickness is normal. Left ventricular systolic function is normal with an ejection fraction visually estimated at 60 to 65 %. There are no regional wall motion abnormalities seen.   2. Normal right ventricular cavity size and probably normal right ventricular systolic function.   3. Structurally normal mitral valve with normal leaflet excursion. There is calcification of the mitral valve annulus. There is trace mitral regurgitation.   4. The left atrium is moderately dilated with an indexed volume of 42.80 ml/m². HPI: 85 y/o lady with PMH of stroke in 2020 (non-verbal and bedbound at baseline - from prior stroke), A-fib on apixaban, HTN, HLD, and seizure disorder presented to the ED on 6/4/25 c/o 1 episode of large, dark red blood per rectum. The pt subsequently lost consciousness for about a minute, as witnessed and reported by pt's daughters.  She was brought into the ED and CTH was done for syncope to "rule out ICH."  CTH showed ICH in the left cerebeullum and neurosurgery was consulted, they recommended for her apixaban to be held, and it was reversed with PCC.  Neurology consulted after repeat CTH showed an area of low-attenuation right cerebellar region which could be compatible with an acute infarct.  All history obtained from chart review.      Uzbek Creole  Ruba #[id number] used for entirety of interview/exam.    LKW: Unknown   NIHSS:  21 (+2 LOC, +2 questions, +2 tasks, +2 LUE, +2 RUE, +3 RLE, +3 LLE, +3 language, +2 dysarthria)   Baseline MRS: 5    ROS cannot be completed 2/2 mental status.    PAST MEDICAL & SURGICAL HISTORY:  Afib  H/O: CVA (cerebrovascular accident)  No significant past surgical history    FAMILY HISTORY:    SOCIAL HISTORY:   DNEISE d/t mental status    MEDICATIONS (HOME):  Home Medications:  atorvastatin 40 mg oral tablet: 1 tab(s) orally once a day (at bedtime) (05 Jun 2025 10:37)  Eliquis 5 mg oral tablet: 1 tab(s) orally 2 times a day (05 Jun 2025 10:35)  enalapril 20 mg oral tablet: 1 tab(s) orally once a day (05 Jun 2025 10:37)  hydroCHLOROthiazide 25 mg oral tablet: 1 tab(s) orally once a day (05 Jun 2025 10:38)  lacosamide 100 mg oral tablet: 1 tab(s) orally 2 times a day (05 Jun 2025 10:40)  metoprolol tartrate 50 mg oral tablet: 1 tab(s) orally 2 times a day (05 Jun 2025 10:36)    MEDICATIONS  (STANDING):  atorvastatin 40 milliGRAM(s) Oral at bedtime  lacosamide 100 milliGRAM(s) Oral two times a day  polyethylene glycol 3350 17 Gram(s) Oral two times a day    MEDICATIONS  (PRN):  senna 2 Tablet(s) Oral at bedtime PRN Constipation    ALLERGIES/INTOLERANCES:  Allergies  No Known Allergies    Intolerances    VITALS & EXAMINATION:  Vital Signs Last 24 Hrs  T(C): 36.7 (06 Jun 2025 13:03), Max: 36.9 (05 Jun 2025 22:13)  T(F): 98 (06 Jun 2025 13:03), Max: 98.4 (05 Jun 2025 22:13)  HR: 94 (06 Jun 2025 13:03) (80 - 99)  BP: 132/74 (06 Jun 2025 13:03) (132/74 - 151/94)  RR: 18 (06 Jun 2025 13:03) (18 - 18)  SpO2: 100% (06 Jun 2025 13:03) (95% - 100%)    Parameters below as of 06 Jun 2025 13:03  Patient On (Oxygen Delivery Method): room air     ID Ruba [id number]   General:  Constitutional: Female, appears stated age, in no apparent distress including pain  Head: Normocephalic & Atraumatic.  Respiratory: Breathing comfortably on RA    Neurological:  MS: Eyes closed, open briefly to noxious central stimuli, then closes again. Not following any commands.    Language: No verbal output.    CNs: Right pupil reactive, left pupil minimal to no reactivity. Appears to BTT b/l. Gaze midline, cross midline with oculocephalics  No obvious facial asymmetry at rest.    Motor: Normal muscle bulk. No noticeable tremor. B/L UE move spontaneously - antigravity - pulls at blanket with both hands.  B/L LE withdraws to noxious stimuli - some movement in the plane of the bed.    Sensation: Grimaces to noxious stimuli equally all 4 extremities.   Coordination:  Unable to assess.   Gait: Unable to assess.        LABORATORY:  CBC                       12.4   8.60  )-----------( 153      ( 06 Jun 2025 05:38 )             38.2     Chem 06-06    143  |  107  |  13  ----------------------------<  97  3.3[L]   |  26  |  0.98    Ca    9.5      06 Jun 2025 05:38    TPro  7.2  /  Alb  3.5  /  TBili  0.4  /  DBili  x   /  AST  17  /  ALT  12  /  AlkPhos  120  06-04    LFTs LIVER FUNCTIONS - ( 04 Jun 2025 23:05 )  Alb: 3.5 g/dL / Pro: 7.2 g/dL / ALK PHOS: 120 U/L / ALT: 12 U/L / AST: 17 U/L / GGT: x           Coagulopathy PT/INR - ( 05 Jun 2025 21:07 )   PT: 12.8 sec;   INR: 1.10 ratio         PTT - ( 05 Jun 2025 21:07 )  PTT:34.5 sec  Lipid Panel   A1c   Cardiac enzymes     U/A Urinalysis Basic - ( 06 Jun 2025 05:38 )    Color: x / Appearance: x / SG: x / pH: x  Gluc: 97 mg/dL / Ketone: x  / Bili: x / Urobili: x   Blood: x / Protein: x / Nitrite: x   Leuk Esterase: x / RBC: x / WBC x   Sq Epi: x / Non Sq Epi: x / Bacteria: x        Radiology (XR, CT, MR, U/S, TTE/HARITHA):    CTH (6/5/25 @ 1048):  Acute parenchymal hemorrhage is identified.    CTH (6/5/25 @ 1530): No change since 10:47 AM. Left superior cerebellar parenchymal hemorrhage with mild vasogenic edema. Lucency right cerebellum suspicious for recent infarct. Old left frontal and right parietal cortical infarcts. Left M1/M2 stent.    CTH/CTA (6/5/25 @ 2320): Stable parenchymal hemorrhage. Area of low-attenuation on the right cerebellar region which could be compatible with an acute infarct. Old infarcts again seen and unchanged CTA of the neck demonstrates no significant stenosis. CTA of the [place] demonstrates evidence of a left-sided stent with short segment of decreased flow related signal seen involving the region of the stent.    TTE (6/5/25):    1. Left ventricular cavity is small. Left ventricular wall thickness is normal. Left ventricular systolic function is normal with an ejection fraction visually estimated at 60 to 65 %. There are no regional wall motion abnormalities seen.   2. Normal right ventricular cavity size and probably normal right ventricular systolic function.   3. Structurally normal mitral valve with normal leaflet excursion. There is calcification of the mitral valve annulus. There is trace mitral regurgitation.   4. The left atrium is moderately dilated with an indexed volume of 42.80 ml/m².

## 2025-06-06 NOTE — CONSULT NOTE ADULT - NSCONSULTADDITIONALINFOA_GEN_ALL_CORE
84-year-old female w/ PMHx stroke (2020, non-verbal & bedbound at baseline), AFib (on apixaban), HTN, HLD, epilepsy (on lacosamide 100 mg BID), presenting on 6/4 due to GI bleed and LOC.    CTH 6/5 @ 10:46 AM: to my eye, lucency in right cerebellum suspicious for recent infarct; acute left superior cerebellar parenchymal hemorrhage w/ mild vasogenic edema w/ mild mass effect; chronic infarcts in L frontal and R posterior frontal parietal lobe; stent in L MCA.  CTH 6/5 @ 15:08 PM: no change, stable.  CTA H/N 6/5 @ 23:08 PM: stable parenchymal hemorrhage; CTA head & neck w/o significant stenosis.  CTH 6/6: stable.    LDL: 53  A1c: 5.8%    Patient seen on morning rounds 6/7 with daughter and granddaughter at bedside. Patient's daughter translating [place]ander-Creole. On examination, patient with eyes open, attends to examiner, no meaningful verbal output; follows most simple commands; unable to assess orientation d/t baseline non-verbal status; no obvious gaze or facial paresis, appears to blink to threat bilaterally; no drifts in b/l UE; grossly full strength on bilateral hand  and triceps; b/l LE antigravity; unable to assess coordination.    Impression: (1) Left superior cerebellar acute parenchymal hemorrhage, in the setting of GI bleed, most concerning for apixaban induced hemorrhage. Will evaluate for underlying lesion w/ MRI brain. (2) Chronic infarcts in addition to potentially subacute right cerebellar infarct most likely due to cardioembolism from atrial fibrillation.    Recommendations:   - As documented in resident's note above, with minor amendments made where necessary.  - Also, given improved mental status today (6/7), would repeat bedside dysphagia screen, as patient is currently NPO.    -  Demian Ho MD  Neurovascular Fellow

## 2025-06-07 LAB
A1C WITH ESTIMATED AVERAGE GLUCOSE RESULT: 5.8 % — HIGH (ref 4–5.6)
ANION GAP SERPL CALC-SCNC: 14 MMOL/L — SIGNIFICANT CHANGE UP (ref 7–14)
APTT BLD: 29.6 SEC — SIGNIFICANT CHANGE UP (ref 26.1–36.8)
BUN SERPL-MCNC: 16 MG/DL — SIGNIFICANT CHANGE UP (ref 7–23)
CALCIUM SERPL-MCNC: 9.7 MG/DL — SIGNIFICANT CHANGE UP (ref 8.4–10.5)
CHLORIDE SERPL-SCNC: 109 MMOL/L — HIGH (ref 98–107)
CHOLEST SERPL-MCNC: 111 MG/DL — SIGNIFICANT CHANGE UP
CO2 SERPL-SCNC: 23 MMOL/L — SIGNIFICANT CHANGE UP (ref 22–31)
CREAT SERPL-MCNC: 1.02 MG/DL — SIGNIFICANT CHANGE UP (ref 0.5–1.3)
EGFR: 54 ML/MIN/1.73M2 — LOW
EGFR: 54 ML/MIN/1.73M2 — LOW
ESTIMATED AVERAGE GLUCOSE: 120 — SIGNIFICANT CHANGE UP
GLUCOSE SERPL-MCNC: 82 MG/DL — SIGNIFICANT CHANGE UP (ref 70–99)
HCT VFR BLD CALC: 37.2 % — SIGNIFICANT CHANGE UP (ref 34.5–45)
HDLC SERPL-MCNC: 43 MG/DL — LOW
HGB BLD-MCNC: 12.1 G/DL — SIGNIFICANT CHANGE UP (ref 11.5–15.5)
INR BLD: 1.15 RATIO — SIGNIFICANT CHANGE UP (ref 0.85–1.16)
LDLC SERPL-MCNC: 53 MG/DL — SIGNIFICANT CHANGE UP
LIPID PNL WITH DIRECT LDL SERPL: 53 MG/DL — SIGNIFICANT CHANGE UP
MAGNESIUM SERPL-MCNC: 2.3 MG/DL — SIGNIFICANT CHANGE UP (ref 1.6–2.6)
MCHC RBC-ENTMCNC: 28.8 PG — SIGNIFICANT CHANGE UP (ref 27–34)
MCHC RBC-ENTMCNC: 32.5 G/DL — SIGNIFICANT CHANGE UP (ref 32–36)
MCV RBC AUTO: 88.6 FL — SIGNIFICANT CHANGE UP (ref 80–100)
NONHDLC SERPL-MCNC: 68 MG/DL — SIGNIFICANT CHANGE UP
NRBC # BLD AUTO: 0 K/UL — SIGNIFICANT CHANGE UP (ref 0–0)
NRBC # FLD: 0 K/UL — SIGNIFICANT CHANGE UP (ref 0–0)
NRBC BLD AUTO-RTO: 0 /100 WBCS — SIGNIFICANT CHANGE UP (ref 0–0)
PHOSPHATE SERPL-MCNC: 2.6 MG/DL — SIGNIFICANT CHANGE UP (ref 2.5–4.5)
PLATELET # BLD AUTO: 153 K/UL — SIGNIFICANT CHANGE UP (ref 150–400)
POTASSIUM SERPL-MCNC: 3.5 MMOL/L — SIGNIFICANT CHANGE UP (ref 3.5–5.3)
POTASSIUM SERPL-SCNC: 3.5 MMOL/L — SIGNIFICANT CHANGE UP (ref 3.5–5.3)
PROTHROM AB SERPL-ACNC: 13.3 SEC — SIGNIFICANT CHANGE UP (ref 9.9–13.4)
RBC # BLD: 4.2 M/UL — SIGNIFICANT CHANGE UP (ref 3.8–5.2)
RBC # FLD: 14.7 % — HIGH (ref 10.3–14.5)
SODIUM SERPL-SCNC: 146 MMOL/L — HIGH (ref 135–145)
TRIGL SERPL-MCNC: 72 MG/DL — SIGNIFICANT CHANGE UP
WBC # BLD: 8.08 K/UL — SIGNIFICANT CHANGE UP (ref 3.8–10.5)
WBC # FLD AUTO: 8.08 K/UL — SIGNIFICANT CHANGE UP (ref 3.8–10.5)

## 2025-06-07 PROCEDURE — 99233 SBSQ HOSP IP/OBS HIGH 50: CPT

## 2025-06-07 RX ORDER — ENOXAPARIN SODIUM 100 MG/ML
40 INJECTION SUBCUTANEOUS EVERY 24 HOURS
Refills: 0 | Status: DISCONTINUED | OUTPATIENT
Start: 2025-06-07 | End: 2025-06-11

## 2025-06-07 RX ORDER — SODIUM CHLORIDE 9 G/1000ML
1000 INJECTION, SOLUTION INTRAVENOUS
Refills: 0 | Status: DISCONTINUED | OUTPATIENT
Start: 2025-06-07 | End: 2025-06-07

## 2025-06-07 RX ADMIN — LACOSAMIDE 100 MILLIGRAM(S): 150 TABLET, FILM COATED ORAL at 18:15

## 2025-06-07 RX ADMIN — ATORVASTATIN CALCIUM 40 MILLIGRAM(S): 80 TABLET, FILM COATED ORAL at 21:52

## 2025-06-07 RX ADMIN — POLYETHYLENE GLYCOL 3350 17 GRAM(S): 17 POWDER, FOR SOLUTION ORAL at 05:30

## 2025-06-07 RX ADMIN — POLYETHYLENE GLYCOL 3350 17 GRAM(S): 17 POWDER, FOR SOLUTION ORAL at 18:16

## 2025-06-07 RX ADMIN — ENOXAPARIN SODIUM 40 MILLIGRAM(S): 100 INJECTION SUBCUTANEOUS at 18:23

## 2025-06-07 RX ADMIN — LACOSAMIDE 100 MILLIGRAM(S): 150 TABLET, FILM COATED ORAL at 05:24

## 2025-06-07 NOTE — SWALLOW BEDSIDE ASSESSMENT ADULT - ORAL PREPARATORY PHASE
slow mastication/gumming with adequate breakdown, mild lingual residue cleared w/liquid wash Within functional limits

## 2025-06-07 NOTE — CHART NOTE - NSCHARTNOTEFT_GEN_A_CORE
Reviewed result of repeat 24 hour CT head. Stable left superior cerebellar parenchymal hemorrhage with mild adjacent vasogenic edema. Result discussed with neurosurgery HERNANDEZ dang. As per PA no interventions are indicated at this time. Will continue to monitor.

## 2025-06-07 NOTE — PROGRESS NOTE ADULT - ASSESSMENT
85 y/o female with PMH of CVA in 2020 (non-verbal at baseline), A-fib on Eliquis, HTN, HLD, and seizure disorder presented to the ED c/o 1 episode of large, dark red blood per rectum. The pt subsequently lost consciousness for about a minute, as witnessed and reported by pt's daughters. Admitted for GI bleed and syncope workup .

## 2025-06-07 NOTE — CHART NOTE - NSCHARTNOTEFT_GEN_A_CORE
Per Neurology PA, pt cleared for chemical dvt ppx today. Spoke with Neurosurgery who also cleared pt to start Lovenox prophylaxis dose. MRI brain ordered r/o underlying lesion. Consent and safety sheet filled out with daughter. Discussed with Dr. Burt.

## 2025-06-07 NOTE — SWALLOW BEDSIDE ASSESSMENT ADULT - COMMENTS
Hospitalist 6/7: "85 y/o female with PMH of CVA in 2020 (non-verbal at baseline), A-fib on Eliquis, HTN, HLD, and seizure disorder presented to the ED c/o 1 episode of large, dark red blood per rectum. The pt subsequently lost consciousness for about a minute, as witnessed and reported by pt's daughters. Admitted for GI bleed and syncope workup ."    No chest imaging this admission.     Patient received awake, alert, upright in bed, granddaughter at bedside. Patient declined [employer] ipad  and requested granddaughter translate to [place]ese Creole. Patient followed all directives. Patient edentulous during evaluation however granddaughter reported family member arriving in ~1 hour with dentures.

## 2025-06-07 NOTE — SWALLOW BEDSIDE ASSESSMENT ADULT - SWALLOW EVAL: DIAGNOSIS
Mild oral phase for regular solids and thin liquids characterized by adequate retrieval, slow gumming/mastication of solid due to edentulous state, adequate anterior posterior transfer, and mild lingual stasis for solid which cleared w/liquid wash. Functional pharyngeal stage for the above noted consistencies characterized by swallow initiation & hyolaryngeal excursion upon digital palpation and no overt clinical s/s airway penetration/aspiration.

## 2025-06-08 LAB
ANION GAP SERPL CALC-SCNC: 12 MMOL/L — SIGNIFICANT CHANGE UP (ref 7–14)
BUN SERPL-MCNC: 20 MG/DL — SIGNIFICANT CHANGE UP (ref 7–23)
CALCIUM SERPL-MCNC: 9.6 MG/DL — SIGNIFICANT CHANGE UP (ref 8.4–10.5)
CHLORIDE SERPL-SCNC: 108 MMOL/L — HIGH (ref 98–107)
CO2 SERPL-SCNC: 23 MMOL/L — SIGNIFICANT CHANGE UP (ref 22–31)
CREAT SERPL-MCNC: 1.09 MG/DL — SIGNIFICANT CHANGE UP (ref 0.5–1.3)
EGFR: 50 ML/MIN/1.73M2 — LOW
EGFR: 50 ML/MIN/1.73M2 — LOW
GLUCOSE SERPL-MCNC: 113 MG/DL — HIGH (ref 70–99)
HCT VFR BLD CALC: 37.3 % — SIGNIFICANT CHANGE UP (ref 34.5–45)
HGB BLD-MCNC: 11.8 G/DL — SIGNIFICANT CHANGE UP (ref 11.5–15.5)
MAGNESIUM SERPL-MCNC: 2.2 MG/DL — SIGNIFICANT CHANGE UP (ref 1.6–2.6)
MCHC RBC-ENTMCNC: 28.9 PG — SIGNIFICANT CHANGE UP (ref 27–34)
MCHC RBC-ENTMCNC: 31.6 G/DL — LOW (ref 32–36)
MCV RBC AUTO: 91.4 FL — SIGNIFICANT CHANGE UP (ref 80–100)
NRBC # BLD AUTO: 0 K/UL — SIGNIFICANT CHANGE UP (ref 0–0)
NRBC # FLD: 0 K/UL — SIGNIFICANT CHANGE UP (ref 0–0)
NRBC BLD AUTO-RTO: 0 /100 WBCS — SIGNIFICANT CHANGE UP (ref 0–0)
PHOSPHATE SERPL-MCNC: 2.6 MG/DL — SIGNIFICANT CHANGE UP (ref 2.5–4.5)
PLATELET # BLD AUTO: 155 K/UL — SIGNIFICANT CHANGE UP (ref 150–400)
POTASSIUM SERPL-MCNC: 3.6 MMOL/L — SIGNIFICANT CHANGE UP (ref 3.5–5.3)
POTASSIUM SERPL-SCNC: 3.6 MMOL/L — SIGNIFICANT CHANGE UP (ref 3.5–5.3)
RBC # BLD: 4.08 M/UL — SIGNIFICANT CHANGE UP (ref 3.8–5.2)
RBC # FLD: 14.8 % — HIGH (ref 10.3–14.5)
SODIUM SERPL-SCNC: 143 MMOL/L — SIGNIFICANT CHANGE UP (ref 135–145)
WBC # BLD: 5.96 K/UL — SIGNIFICANT CHANGE UP (ref 3.8–10.5)
WBC # FLD AUTO: 5.96 K/UL — SIGNIFICANT CHANGE UP (ref 3.8–10.5)

## 2025-06-08 PROCEDURE — 99233 SBSQ HOSP IP/OBS HIGH 50: CPT

## 2025-06-08 RX ORDER — METOPROLOL SUCCINATE 50 MG/1
25 TABLET, EXTENDED RELEASE ORAL
Refills: 0 | Status: DISCONTINUED | OUTPATIENT
Start: 2025-06-08 | End: 2025-06-11

## 2025-06-08 RX ADMIN — ENOXAPARIN SODIUM 40 MILLIGRAM(S): 100 INJECTION SUBCUTANEOUS at 17:59

## 2025-06-08 RX ADMIN — LACOSAMIDE 100 MILLIGRAM(S): 150 TABLET, FILM COATED ORAL at 18:02

## 2025-06-08 RX ADMIN — POLYETHYLENE GLYCOL 3350 17 GRAM(S): 17 POWDER, FOR SOLUTION ORAL at 05:27

## 2025-06-08 RX ADMIN — METOPROLOL SUCCINATE 25 MILLIGRAM(S): 50 TABLET, EXTENDED RELEASE ORAL at 18:02

## 2025-06-08 RX ADMIN — LACOSAMIDE 100 MILLIGRAM(S): 150 TABLET, FILM COATED ORAL at 05:27

## 2025-06-08 RX ADMIN — ATORVASTATIN CALCIUM 40 MILLIGRAM(S): 80 TABLET, FILM COATED ORAL at 21:25

## 2025-06-08 RX ADMIN — POLYETHYLENE GLYCOL 3350 17 GRAM(S): 17 POWDER, FOR SOLUTION ORAL at 18:09

## 2025-06-08 NOTE — PHYSICAL THERAPY INITIAL EVALUATION ADULT - ADDITIONAL COMMENTS
Patient's living situation and baseline functional status received from patient's son at bedside. Patient lives in a house with her daughter and has 3 stairs to enter. Patient previously ambulated with a rolling walker. Patient also owns a rollator and wheelchair.   Patient left sitting in bed with head of bed elevated to 90 degrees with +monitor lines, +call bell, +bed alarm, and in NAD. RNTrina, made aware of patient's position and participation in session.

## 2025-06-08 NOTE — PHYSICAL THERAPY INITIAL EVALUATION ADULT - LEVEL OF INDEPENDENCE: GAIT, REHAB EVAL
Deferred at this time due to increase in patient's HR while performing standing marches. Patient's HR elevated to 136 beats per minute. Patient returned to sitting at edge of bed and instructed to perform deep breaths and she rested. HR slowly decreased to 101 beats per minute. Trina AGUAYO made aware

## 2025-06-08 NOTE — OCCUPATIONAL THERAPY INITIAL EVALUATION ADULT - PERTINENT HX OF CURRENT PROBLEM, REHAB EVAL
As per EMR: "84 year old female with past medical history of CVA in 2020 (non-verbal at baseline), A-fib on Eliquis, HTN, HLD, and seizure disorder presented to the ED c/o 1 episode of large, dark red blood per rectum. The Pt subsequently lost consciousness for about a minute, as witnessed and reported by pt's daughters."  Admit dx: Gastrointestinal hemorrhage    CTH: "Left superior cerebellar parenchymal hemorrhage with mild vasogenic edema. Lucency right cerebellum suspicious for recent infarct. Old left frontal and right parietal cortical infarcts. Left M1/M2 stent."

## 2025-06-08 NOTE — OCCUPATIONAL THERAPY INITIAL EVALUATION ADULT - ADDITIONAL COMMENTS
Social history obtained from Pt's son present at bedside: Pt resides in a house with her daughter, all needs met on first floor. Pt was ambulating household distance with rolling walker or rollator "sometimes" independently and required assistance with ADLs. Pt's son reports however more recently Pt has been requiring more assistance with ADLs and ambulation. Pt owns a wheelchair and a shower chair. Pt's daughter is home health aide.   Pt left semi-supine in bed in NAD, all lines intact,  (+) alarm, call bell in reach, RN aware.

## 2025-06-08 NOTE — PHYSICAL THERAPY INITIAL EVALUATION ADULT - PLANNED THERAPY INTERVENTIONS, PT EVAL
balance training/gait training/strengthening/transfer training balance training/gait training/postural re-education/strengthening/transfer training

## 2025-06-08 NOTE — OCCUPATIONAL THERAPY INITIAL EVALUATION ADULT - STANDING BALANCE: DYNAMIC, REHAB EVAL
Pt completed standing marches with minimum assistance x2 due to decreased ability to maintain upright standing balance, however further functional mobility deferred due to Pt with elevated hear rate and returned back to bed./poor plus

## 2025-06-08 NOTE — PHYSICAL THERAPY INITIAL EVALUATION ADULT - GENERAL OBSERVATIONS, REHAB EVAL
Patient received semi-supine in bed with +monitor lines, +bed alarm, +call bell, and in NAD. Patient's son at bedside at the end of session. Patient's HR: 97 beats per minute.

## 2025-06-08 NOTE — OCCUPATIONAL THERAPY INITIAL EVALUATION ADULT - DIAGNOSIS, OT EVAL
Pt with impaired strength and standing balance impacting ability to complete ADLs, IADLs, functional mobility/transfers.

## 2025-06-08 NOTE — OCCUPATIONAL THERAPY INITIAL EVALUATION ADULT - GENERAL OBSERVATIONS, REHAB EVAL
Pt received supine in bed in NAD +tele, all lines intact. Pt OK to be seen for OT per OLIVIER Hernández. HR initially 99 BPM, ranging between 104-140s when completing bed mobility/sit <> stand, and 94 BPM when returned back to bed.

## 2025-06-09 LAB
ANION GAP SERPL CALC-SCNC: 11 MMOL/L — SIGNIFICANT CHANGE UP (ref 7–14)
BUN SERPL-MCNC: 12 MG/DL — SIGNIFICANT CHANGE UP (ref 7–23)
CALCIUM SERPL-MCNC: 9.4 MG/DL — SIGNIFICANT CHANGE UP (ref 8.4–10.5)
CHLORIDE SERPL-SCNC: 107 MMOL/L — SIGNIFICANT CHANGE UP (ref 98–107)
CO2 SERPL-SCNC: 24 MMOL/L — SIGNIFICANT CHANGE UP (ref 22–31)
CREAT SERPL-MCNC: 0.88 MG/DL — SIGNIFICANT CHANGE UP (ref 0.5–1.3)
EGFR: 65 ML/MIN/1.73M2 — SIGNIFICANT CHANGE UP
EGFR: 65 ML/MIN/1.73M2 — SIGNIFICANT CHANGE UP
GLUCOSE SERPL-MCNC: 95 MG/DL — SIGNIFICANT CHANGE UP (ref 70–99)
HCT VFR BLD CALC: 39 % — SIGNIFICANT CHANGE UP (ref 34.5–45)
HGB BLD-MCNC: 12.3 G/DL — SIGNIFICANT CHANGE UP (ref 11.5–15.5)
MAGNESIUM SERPL-MCNC: 2 MG/DL — SIGNIFICANT CHANGE UP (ref 1.6–2.6)
MCHC RBC-ENTMCNC: 29 PG — SIGNIFICANT CHANGE UP (ref 27–34)
MCHC RBC-ENTMCNC: 31.5 G/DL — LOW (ref 32–36)
MCV RBC AUTO: 92 FL — SIGNIFICANT CHANGE UP (ref 80–100)
NRBC # BLD AUTO: 0 K/UL — SIGNIFICANT CHANGE UP (ref 0–0)
NRBC # FLD: 0 K/UL — SIGNIFICANT CHANGE UP (ref 0–0)
NRBC BLD AUTO-RTO: 0 /100 WBCS — SIGNIFICANT CHANGE UP (ref 0–0)
PHOSPHATE SERPL-MCNC: 2.4 MG/DL — LOW (ref 2.5–4.5)
PLATELET # BLD AUTO: 168 K/UL — SIGNIFICANT CHANGE UP (ref 150–400)
POTASSIUM SERPL-MCNC: 3.1 MMOL/L — LOW (ref 3.5–5.3)
POTASSIUM SERPL-SCNC: 3.1 MMOL/L — LOW (ref 3.5–5.3)
RBC # BLD: 4.24 M/UL — SIGNIFICANT CHANGE UP (ref 3.8–5.2)
RBC # FLD: 14.8 % — HIGH (ref 10.3–14.5)
SODIUM SERPL-SCNC: 142 MMOL/L — SIGNIFICANT CHANGE UP (ref 135–145)
WBC # BLD: 5.75 K/UL — SIGNIFICANT CHANGE UP (ref 3.8–10.5)
WBC # FLD AUTO: 5.75 K/UL — SIGNIFICANT CHANGE UP (ref 3.8–10.5)

## 2025-06-09 PROCEDURE — 99233 SBSQ HOSP IP/OBS HIGH 50: CPT

## 2025-06-09 PROCEDURE — 70553 MRI BRAIN STEM W/O & W/DYE: CPT | Mod: 26

## 2025-06-09 RX ORDER — HALOPERIDOL 10 MG/1
2 TABLET ORAL ONCE
Refills: 0 | Status: DISCONTINUED | OUTPATIENT
Start: 2025-06-09 | End: 2025-06-11

## 2025-06-09 RX ORDER — MELATONIN 5 MG
3 TABLET ORAL AT BEDTIME
Refills: 0 | Status: DISCONTINUED | OUTPATIENT
Start: 2025-06-09 | End: 2025-06-11

## 2025-06-09 RX ORDER — SOD PHOS DI, MONO/K PHOS MONO 250 MG
1 TABLET ORAL THREE TIMES A DAY
Refills: 0 | Status: COMPLETED | OUTPATIENT
Start: 2025-06-09 | End: 2025-06-10

## 2025-06-09 RX ADMIN — LACOSAMIDE 100 MILLIGRAM(S): 150 TABLET, FILM COATED ORAL at 05:00

## 2025-06-09 RX ADMIN — Medication 40 MILLIEQUIVALENT(S): at 18:14

## 2025-06-09 RX ADMIN — POLYETHYLENE GLYCOL 3350 17 GRAM(S): 17 POWDER, FOR SOLUTION ORAL at 18:15

## 2025-06-09 RX ADMIN — ATORVASTATIN CALCIUM 40 MILLIGRAM(S): 80 TABLET, FILM COATED ORAL at 22:59

## 2025-06-09 RX ADMIN — LACOSAMIDE 100 MILLIGRAM(S): 150 TABLET, FILM COATED ORAL at 18:13

## 2025-06-09 RX ADMIN — POLYETHYLENE GLYCOL 3350 17 GRAM(S): 17 POWDER, FOR SOLUTION ORAL at 05:00

## 2025-06-09 RX ADMIN — Medication 40 MILLIEQUIVALENT(S): at 11:53

## 2025-06-09 RX ADMIN — METOPROLOL SUCCINATE 25 MILLIGRAM(S): 50 TABLET, EXTENDED RELEASE ORAL at 05:00

## 2025-06-09 RX ADMIN — Medication 1 TABLET(S): at 23:34

## 2025-06-09 RX ADMIN — ENOXAPARIN SODIUM 40 MILLIGRAM(S): 100 INJECTION SUBCUTANEOUS at 18:13

## 2025-06-09 RX ADMIN — METOPROLOL SUCCINATE 25 MILLIGRAM(S): 50 TABLET, EXTENDED RELEASE ORAL at 18:16

## 2025-06-09 NOTE — PROGRESS NOTE ADULT - PROBLEM SELECTOR PLAN 9
DVT prophylaxis: lovenox   Diet: Regular  Disposition: Pending clinical course, potentially in next 48-72 hours DVT prophylaxis: lovenox   Diet: Regular  Disposition: Pending clinical course, potentially in next 48-72 hours  Contact: daughter, Emma ([phone]), updated via phone

## 2025-06-09 NOTE — PROGRESS NOTE ADULT - ASSESSMENT
85 y/o lady with PMH of stroke in 2020 (non-verbal and bedbound at baseline - from prior stroke), A-fib on apixaban, HTN, HLD, and seizure disorder presented to the ED on 6/4/25 c/o 1 episode of large, dark red blood per rectum. The pt subsequently lost consciousness for about a minute, as witnessed and reported by pt's daughters.  She was brought into the ED and CTH was done for syncope to "rule out ICH."  CTH showed ICH in the left cerebeullum and neurosurgery was consulted, they recommended for her apixaban to be held, and it was reversed with PCC.  Neurology consulted after repeat CTH showed an area of low-attenuation right cerebellar region which could be compatible with an acute infarct.  All history obtained from chart review.      LKW: Unknown   NIHSS:  21 (+2 LOC, +2 questions, +2 tasks, +2 LUE, +2 RUE, +3 RLE, +3 LLE, +3 language, +2 dysarthria)   Baseline MRS: 5  No tenecteplase d/t outside therapeutic window.  No thrombectomy d/t no ELVO.    Impression:  Presented initially to the ED after concern for GI bleed and loss of consciousness, CTH showed LEFT cerebellar ICH, and then repeat CTH concern for acute R cerebellar infarct. Mechanism if ICH likely related to anticoagulation (possibly with component of HTN). Mechanism of acute R cerebellar infarct may be cardioembolic d/t Afib.    Recommendations:  [] Agree to hold AC for now, for two weeks, as per neurosurgery   [] Obtain repeat CT head and if hemorrhage stable, OK to resume aspirin 81 mg daily tomorrowI   [] Consider Watchman   [] Atorvastatin 40 mg daily titrated per LDL < 70  [x] HgbA1C: 5.8, LDL: 53  [x] Stroke neuro-checks and VS q4h  [x] Blood pressure goals <140/90   [x] TTE, results as noted above.   [] Tight glucose control (long-term goal HgbA1c < 7%)  [] Stroke education and counseling  [] Stroke neuro-checks and VS q4h  [] Blood pressure goals <140/90   [] Dysphagia screen. If fails, speech/swallow eval  [] aspiration and fall precautions  [] STAT CT head non-contrast for change in neuro exam  [] PT/ OT evaluated - ANA  [] DVT ppx: SCDs     Seen and discussed with stroke attending Dr. Farr.    Please call with questions: [phone]    85 y/o lady with PMH of stroke in 2020 (non-verbal and bedbound at baseline - from prior stroke), A-fib on apixaban, HTN, HLD, and seizure disorder presented to the ED on 6/4/25 c/o 1 episode of large, dark red blood per rectum. The pt subsequently lost consciousness for about a minute, as witnessed and reported by pt's daughters.  She was brought into the ED and CTH was done for syncope to "rule out ICH."  CTH showed ICH in the left cerebeullum and neurosurgery was consulted, they recommended for her apixaban to be held, and it was reversed with PCC.  Neurology consulted after repeat CTH showed an area of low-attenuation right cerebellar region which could be compatible with an acute infarct.  All history obtained from chart review.      LKW: Unknown   NIHSS:  21 (+2 LOC, +2 questions, +2 tasks, +2 LUE, +2 RUE, +3 RLE, +3 LLE, +3 language, +2 dysarthria)   Baseline MRS: 5  No tenecteplase d/t outside therapeutic window.  No thrombectomy d/t no ELVO.    Impression:  Presented initially to the ED after concern for GI bleed and loss of consciousness, CTH showed LEFT cerebellar ICH, and then repeat CTH concern for acute R cerebellar infarct. Mechanism if ICH likely related to anticoagulation (possibly with component of HTN). Mechanism of acute R cerebellar infarct may be cardioembolic d/t Afib.    Recommendations:  [] Agree to hold AC for now, for two weeks, as per neurosurgery   [] Obtain repeat CT head and if hemorrhage stable, OK to resume aspirin 81 mg daily tomorrow.  [] Would defer MRI Brain from our standpoint at this time, given patient unlikely to tolerate  [] Consider Watchman   [] Atorvastatin 40 mg daily titrated per LDL < 70  [x] HgbA1C: 5.8, LDL: 53  [x] Stroke neuro-checks and VS q4h  [x] Blood pressure goals <140/90   [x] TTE, results as noted above.   [] Tight glucose control (long-term goal HgbA1c < 7%)  [] Stroke education and counseling  [] Stroke neuro-checks and VS q4h  [] Blood pressure goals <140/90   [] Dysphagia screen. If fails, speech/swallow eval  [] aspiration and fall precautions  [] STAT CT head non-contrast for change in neuro exam  [] PT/ OT evaluated - ANA  [] DVT ppx: SCDs     Seen and discussed with stroke attending Dr. Farr.    Please call with questions: [phone]

## 2025-06-09 NOTE — CONSULT NOTE ADULT - SUBJECTIVE AND OBJECTIVE BOX
Date of Admission: 6/5/2025    CHIEF COMPLAINT: GIB, syncope     HISTORY OF PRESENT ILLNESS:  This is a 84yoF w/ PMHx CVA in 2020, afib on Eliquis, HTN, HLD, seizure disorder initially presented to the ED with BRBPR and was subsequently unresponsive.  Was found by EMS to be hypotensive 60/40s.  H&H was lower than her baseline 13.2 from 14.1 and was given PRBC.  CTA A/P show no evidence of active GI bleed.  CTH showed acute parenchymal hemorrhage with some surrounding edema.  Repeat CTH stable.  AC was discontinued.  EP consulted for watchman workup.      Allergies  No Known Allergies  Intolerances    MEDICATIONS:  enoxaparin Injectable 40 milliGRAM(s) SubCutaneous every 24 hours  metoprolol tartrate 25 milliGRAM(s) Oral two times a day  haloperidol    Injectable 2 milliGRAM(s) IV Push once PRN  lacosamide 100 milliGRAM(s) Oral two times a day  melatonin 3 milliGRAM(s) Oral at bedtime PRN  polyethylene glycol 3350 17 Gram(s) Oral two times a day  senna 2 Tablet(s) Oral at bedtime PRN  atorvastatin 40 milliGRAM(s) Oral at bedtime  potassium chloride   Powder 40 milliEquivalent(s) Oral every 6 hours  potassium phosphate / sodium phosphate Tablet (K-PHOS No. 2) 1 Tablet(s) Oral three times a day    PAST MEDICAL & SURGICAL HISTORY:  Afib  H/O: CVA (cerebrovascular accident    No significant past surgical history    FAMILY HISTORY:    REVIEW OF SYSTEMS:  See HPI. Otherwise, 10 point ROS done and otherwise negative.    PHYSICAL EXAM:  T(C): 37.1 (06-09-25 @ 04:56), Max: 37.1 (06-09-25 @ 04:56)  HR: 84 (06-09-25 @ 04:56) (84 - 94)  BP: 124/76 (06-09-25 @ 04:56) (124/76 - 153/95)  RR: 20 (06-09-25 @ 04:56) (16 - 20)  SpO2: 100% (06-09-25 @ 04:56) (99% - 100%)  Wt(kg): --  I&O's Summary    Appearance: Normal	  HEENT:   Normal oral mucosa, PERRL, EOMI	  Lymphatic: No lymphadenopathy  Cardiovascular: Normal S1 S2, No JVD, No murmurs, No edema  Respiratory: Lungs clear to auscultation	  Psychiatry: A & O x 3, Mood & affect appropriate  Gastrointestinal:  Soft, Non-tender, + BS	  Skin: No rashes, No ecchymoses, No cyanosis	  Neurologic: Non-focal  Extremities: Normal range of motion, No clubbing, cyanosis or edema  Vascular: Peripheral pulses palpable 2+ bilaterally    LABS:	 	  CBC Full  -  ( 09 Jun 2025 05:44 )  WBC Count : 5.75 K/uL  Hemoglobin : 12.3 g/dL  Hematocrit : 39.0 %  Platelet Count - Automated : 168 K/uL  Mean Cell Volume : 92.0 fL  Mean Cell Hemoglobin : 29.0 pg  Mean Cell Hemoglobin Concentration : 31.5 g/dL  Auto Neutrophil # : x  Auto Lymphocyte # : x  Auto Monocyte # : x  Auto Eosinophil # : x  Auto Basophil # : x  Auto Neutrophil % : x  Auto Lymphocyte % : x  Auto Monocyte % : x  Auto Eosinophil % : x  Auto Basophil % : x    06-09    142  |  107  |  12  ----------------------------<  95  3.1[L]   |  24  |  0.88  06-08    143  |  108[H]  |  20  ----------------------------<  113[H]  3.6   |  23  |  1.09    Ca    9.4      09 Jun 2025 05:44  Ca    9.6      08 Jun 2025 05:39  Phos  2.4     06-09  Phos  2.6     06-08  Mg     2.00     06-09  Mg     2.20     06-08

## 2025-06-09 NOTE — CONSULT NOTE ADULT - ASSESSMENT
84yoF w/ PMHx CVA in 2020, afib on Eliquis, HTN, HLD, seizure disorder initially presented to the ED with BRBPR and was subsequently unresponsive.  Was found by EMS to be hypotensive 60/40s.  H&H was lower than her baseline 13.2 from 14.1 and was given PRBC.  CTA A/P show no evidence of active GI bleed.  CTH showed acute parenchymal hemorrhage with some surrounding edema.  Repeat CTH stable.  AC was discontinued.  EP consulted for watchman workup.      Discussed with patient and her family regarding a Watchman device   Reviewed risks and benefits   Continue to monitor   Patient's family agreeable to procedure and is amenable for inpatient versus outpatient

## 2025-06-09 NOTE — CHART NOTE - NSCHARTNOTEFT_GEN_A_CORE
Notified by RN that patient removed telemetry leads multiple times despite placement of leads on the back.  As per Rn patient is attempting to remove prima fit. Attempted reorientation without success. Bilateral unsecured mittens ordered. Patient seen and assessed  at bedside. Patient is  alert x  0 with bilateral unsecured mittens on. Will continue mittens at this time.

## 2025-06-09 NOTE — CONSULT NOTE ADULT - NS ATTEND AMEND GEN_ALL_CORE FT
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 Baseline functional status appears poor; I do not think she is an appropriate candidate for Watchman at this time.

## 2025-06-10 DIAGNOSIS — G93.49 OTHER ENCEPHALOPATHY: ICD-10-CM

## 2025-06-10 DIAGNOSIS — Z71.89 OTHER SPECIFIED COUNSELING: ICD-10-CM

## 2025-06-10 DIAGNOSIS — K62.5 HEMORRHAGE OF ANUS AND RECTUM: ICD-10-CM

## 2025-06-10 DIAGNOSIS — Z51.5 ENCOUNTER FOR PALLIATIVE CARE: ICD-10-CM

## 2025-06-10 DIAGNOSIS — R53.81 OTHER MALAISE: ICD-10-CM

## 2025-06-10 LAB
ANION GAP SERPL CALC-SCNC: 12 MMOL/L — SIGNIFICANT CHANGE UP (ref 7–14)
BUN SERPL-MCNC: 12 MG/DL — SIGNIFICANT CHANGE UP (ref 7–23)
CALCIUM SERPL-MCNC: 9.7 MG/DL — SIGNIFICANT CHANGE UP (ref 8.4–10.5)
CHLORIDE SERPL-SCNC: 108 MMOL/L — HIGH (ref 98–107)
CO2 SERPL-SCNC: 22 MMOL/L — SIGNIFICANT CHANGE UP (ref 22–31)
CREAT SERPL-MCNC: 0.81 MG/DL — SIGNIFICANT CHANGE UP (ref 0.5–1.3)
EGFR: 72 ML/MIN/1.73M2 — SIGNIFICANT CHANGE UP
EGFR: 72 ML/MIN/1.73M2 — SIGNIFICANT CHANGE UP
GLUCOSE SERPL-MCNC: 100 MG/DL — HIGH (ref 70–99)
HCT VFR BLD CALC: 37.9 % — SIGNIFICANT CHANGE UP (ref 34.5–45)
HGB BLD-MCNC: 12.4 G/DL — SIGNIFICANT CHANGE UP (ref 11.5–15.5)
MAGNESIUM SERPL-MCNC: 2 MG/DL — SIGNIFICANT CHANGE UP (ref 1.6–2.6)
MCHC RBC-ENTMCNC: 28.9 PG — SIGNIFICANT CHANGE UP (ref 27–34)
MCHC RBC-ENTMCNC: 32.7 G/DL — SIGNIFICANT CHANGE UP (ref 32–36)
MCV RBC AUTO: 88.3 FL — SIGNIFICANT CHANGE UP (ref 80–100)
NRBC # BLD AUTO: 0 K/UL — SIGNIFICANT CHANGE UP (ref 0–0)
NRBC # FLD: 0 K/UL — SIGNIFICANT CHANGE UP (ref 0–0)
NRBC BLD AUTO-RTO: 0 /100 WBCS — SIGNIFICANT CHANGE UP (ref 0–0)
PHOSPHATE SERPL-MCNC: 2.5 MG/DL — SIGNIFICANT CHANGE UP (ref 2.5–4.5)
PLATELET # BLD AUTO: 162 K/UL — SIGNIFICANT CHANGE UP (ref 150–400)
POTASSIUM SERPL-MCNC: 4.2 MMOL/L — SIGNIFICANT CHANGE UP (ref 3.5–5.3)
POTASSIUM SERPL-SCNC: 4.2 MMOL/L — SIGNIFICANT CHANGE UP (ref 3.5–5.3)
RBC # BLD: 4.29 M/UL — SIGNIFICANT CHANGE UP (ref 3.8–5.2)
RBC # FLD: 14.5 % — SIGNIFICANT CHANGE UP (ref 10.3–14.5)
SODIUM SERPL-SCNC: 142 MMOL/L — SIGNIFICANT CHANGE UP (ref 135–145)
WBC # BLD: 6.13 K/UL — SIGNIFICANT CHANGE UP (ref 3.8–10.5)
WBC # FLD AUTO: 6.13 K/UL — SIGNIFICANT CHANGE UP (ref 3.8–10.5)

## 2025-06-10 PROCEDURE — 99232 SBSQ HOSP IP/OBS MODERATE 35: CPT

## 2025-06-10 PROCEDURE — 99497 ADVNCD CARE PLAN 30 MIN: CPT | Mod: 25

## 2025-06-10 PROCEDURE — 99233 SBSQ HOSP IP/OBS HIGH 50: CPT

## 2025-06-10 PROCEDURE — 99223 1ST HOSP IP/OBS HIGH 75: CPT

## 2025-06-10 PROCEDURE — 99498 ADVNCD CARE PLAN ADDL 30 MIN: CPT | Mod: 25

## 2025-06-10 PROCEDURE — 70450 CT HEAD/BRAIN W/O DYE: CPT | Mod: 26

## 2025-06-10 RX ORDER — HALOPERIDOL 10 MG/1
0.5 TABLET ORAL ONCE
Refills: 0 | Status: DISCONTINUED | OUTPATIENT
Start: 2025-06-10 | End: 2025-06-10

## 2025-06-10 RX ORDER — ASPIRIN 325 MG
81 TABLET ORAL DAILY
Refills: 0 | Status: DISCONTINUED | OUTPATIENT
Start: 2025-06-10 | End: 2025-06-11

## 2025-06-10 RX ORDER — ACETAMINOPHEN 500 MG/5ML
500 LIQUID (ML) ORAL ONCE
Refills: 0 | Status: COMPLETED | OUTPATIENT
Start: 2025-06-10 | End: 2025-06-10

## 2025-06-10 RX ADMIN — Medication 81 MILLIGRAM(S): at 18:44

## 2025-06-10 RX ADMIN — Medication 500 MILLIGRAM(S): at 23:03

## 2025-06-10 RX ADMIN — Medication 200 MILLIGRAM(S): at 22:24

## 2025-06-10 RX ADMIN — POLYETHYLENE GLYCOL 3350 17 GRAM(S): 17 POWDER, FOR SOLUTION ORAL at 18:40

## 2025-06-10 RX ADMIN — LACOSAMIDE 100 MILLIGRAM(S): 150 TABLET, FILM COATED ORAL at 07:02

## 2025-06-10 RX ADMIN — ENOXAPARIN SODIUM 40 MILLIGRAM(S): 100 INJECTION SUBCUTANEOUS at 18:40

## 2025-06-10 RX ADMIN — LACOSAMIDE 100 MILLIGRAM(S): 150 TABLET, FILM COATED ORAL at 18:44

## 2025-06-10 RX ADMIN — METOPROLOL SUCCINATE 25 MILLIGRAM(S): 50 TABLET, EXTENDED RELEASE ORAL at 18:40

## 2025-06-10 RX ADMIN — ATORVASTATIN CALCIUM 40 MILLIGRAM(S): 80 TABLET, FILM COATED ORAL at 21:32

## 2025-06-10 RX ADMIN — Medication 1 TABLET(S): at 06:54

## 2025-06-10 RX ADMIN — METOPROLOL SUCCINATE 25 MILLIGRAM(S): 50 TABLET, EXTENDED RELEASE ORAL at 06:55

## 2025-06-10 RX ADMIN — POLYETHYLENE GLYCOL 3350 17 GRAM(S): 17 POWDER, FOR SOLUTION ORAL at 06:55

## 2025-06-10 NOTE — CONSULT NOTE ADULT - PROBLEM SELECTOR RECOMMENDATION 3
- in setting of cerebellar hemorrhage with hx of prior CVA (baseline- nonverbal)   - please coordinate care with family

## 2025-06-10 NOTE — CONSULT NOTE ADULT - PROBLEM SELECTOR RECOMMENDATION 2
- CT 6/5 - Rectal wall thickening with perirectal edema. Findings suggests proctitis. There is no CT evidence of active GI bleeding.  - GI recommendations appreciated  - management as per primary team

## 2025-06-10 NOTE — PROGRESS NOTE ADULT - NS ATTEND AMEND GEN_ALL_CORE FT
Pt with poor baseline status and not able to take antiplatelets or short term anticoagulation. Not a candidate for Watchman currently. EP to sign off.

## 2025-06-10 NOTE — CONSULT NOTE ADULT - SUBJECTIVE AND OBJECTIVE BOX
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 The pt subsequently lost consciousness for about a minute, as witnessed and reported by pt's daughters. Family then called EMS. Per EMS, blood pressure 60/40s in field -->112/64 upon ED arrival with HR in 60s. Of note, pt last took Eliquis 5 mg yesterday morning.     In the ED, VSS. Hgb resulted 13.2 (hgb 14.1 on 5/16/2025). She received 1U pRBC for symptomatic, acute blood loss anemia.   CTA Abdomen/Pelvis reviewed: Rectal wall thickening with perirectal edema. Findings suggests proctitis. There is no CT evidence of active GI bleeding.  Pt recieved 1 L NS bolus, Protonix 80 mg IV x 1, and 1 dose of CTX and Flagyl in the ER.       (05 Jun 2025 10:31)    Interval History:   [place] Creole Interpret [id number]  Patient awake and alert; non verbal - attempts to speak but makes sounds instead. Able to follow some simple commands .     PERTINENT PM/SXH:   Afib  H/O: CVA (cerebrovascular accident)  No significant past surgical history    FAMILY HISTORY:    ITEMS NOT CHECKED ARE NOT PRESENT    SOCIAL HISTORY:   Significant other/partner[ ]  Children[ x]  Denominational/Spirituality:  Substance hx:  [ ]   Tobacco hx:  [ ]   Alcohol hx: [ ]   Home Opioid hx:  [ ] I-Stop Reference No:  [id number]  Prescription Information      PDI Filter:    PDI	Current Rx	Drug Type	Rx Written	Rx Dispensed	Drug	Quantity	Days Supply	Prescriber Name	Prescriber DEVONTE #	Payment Method	Dispenser  A	N		04/07/2025	05/07/2025	lacosamide 100 mg tablet	60	30	Lola Reza MD	[id number]	Medicare	[id number]  A	N		04/07/2025	04/07/2025	lacosamide 100 mg tablet	60	30	Lola Reza MD	[id number]	Medicare	[id number]  A	N		10/07/2024	02/28/2025	lacosamide 100 mg tablet	60	30	Belotte, Lola Hamlin MD	[id number]	Medicare	[id number]  A	N	O	02/11/2025	02/12/2025	tramadol hcl 50 mg tablet	20	20	Cliff Lilly	[id number]	Medicare	[id number]  A	N		10/07/2024	01/30/2025	lacosamide 100 mg tablet	60	30	Belotte, Lola Hamlin MD	[id number]	Medicare	[id number]  A	N		10/07/2024	12/29/2024	lacosamide 100 mg tablet	60	30	Belotte, Lola Hamlin MD	[id number]	Medicare	[id number]  A	N	O	12/02/2024	12/03/2024	tramadol hcl 50 mg tablet	60	30	Belotte, Lola Hamlin MD	[id number]	Medicare	[id number]  A	N		10/07/2024	11/29/2024	lacosamide 100 mg tablet	60	30	Belotte, Lola Hamlin MD	[id number]	Medicare	[id number]  A	N	O	11/05/2024	11/06/2024	tramadol hcl 50 mg tablet	14	7	Belotte, Lola Hamlin MD	[id number]	Medicare	[id number]  A	N		10/07/2024	10/28/2024	lacosamide 100 mg tablet	60	30	Belotte, Lola Hamlin MD	[id number]	Medicare	[id number]  A	N		07/27/2024	09/27/2024	lacosamide 100 mg tablet	60	30	Belotte, Lola Hamlin MD	[id number]	Medicare	[id number]  A	N		07/27/2024	08/28/2024	lacosamide 100 mg tablet	60	30	Belotte, Lola Hamlin MD	[id number]	Medicare	[id number]  A	N		07/27/2024	07/27/2024	lacosamide 100 mg tablet	60	30	Belotte, Lola Hamlin MD	[id number]	Medicare	[id number]  A	N		04/11/2024	06/27/2024	lacosamide 100 mg tablet	60	30	Belotte, Lola Alixe MD	[id number]	Medicare	[id number]  Living Situation: [x ]Home  [ ]Long term care  [ ]Rehab [ ]Other      ADVANCE DIRECTIVES:    MOLST  [ ]  Living Will  [ ]   DECISION MAKER(s):  [ ] Health Care Proxy(s)  [x ] Surrogate(s)  [ ] Guardian           Name(s): Phone Number(s):  Daughter Ines Dorsaint [phone]  Niece Cintia Stewart: [phone]  Daughter Venite Dorsaint: [phone]    BASELINE (I)ADL(s) (prior to admission):  [place]: [ ]Total  [ ] Moderate [x ]Dependent    Allergies    No Known Allergies    Intolerances    MEDICATIONS  (STANDING):  aspirin  chewable 81 milliGRAM(s) Oral daily  atorvastatin 40 milliGRAM(s) Oral at bedtime  enoxaparin Injectable 40 milliGRAM(s) SubCutaneous every 24 hours  lacosamide 100 milliGRAM(s) Oral two times a day  metoprolol tartrate 25 milliGRAM(s) Oral two times a day  polyethylene glycol 3350 17 Gram(s) Oral two times a day    MEDICATIONS  (PRN):  haloperidol    Injectable 2 milliGRAM(s) IV Push once PRN agitation prior to MRI  melatonin 3 milliGRAM(s) Oral at bedtime PRN Insomnia  senna 2 Tablet(s) Oral at bedtime PRN Constipation        ITEMS UNCHECKED ARE NOT PRESENT     PRESENT SYMPTOMS: [ x]Unable to self-report due to altered mental status  [ ] CPOT [ x] PAINADs [x ] RDOS  Source if other than patient:  [ ]Family   [ ]Team     Pain: [ ]yes [ ]no  QOL impact -   Location -                    Aggravating factors -  Quality -  Radiation -  Timing-  Severity (0-10 scale):  Minimal acceptable level / Pain goal (0-10 scale):     CPOT:    https://www.sccm.org/getattachment/jxi69h74-6q4w-4w3v-7i4i-7092y3902f6k/Critical-Care-Pain-Observation-Tool-(CPOT)    Dyspnea:                           [ ]Mild [ ]Moderate [ ]Severe  Anxiety:                             [ ]Mild [ ]Moderate [ ]Severe  Agitation:                          [ ]Mild [ ]Moderate [ ]Severe  Fatigue:                             [ ]Mild [ ]Moderate [ ]Severe  Nausea:                             [ ]Mild [ ]Moderate [ ]Severe  Loss of appetite:              [ ]Mild [ ]Moderate [ ]Severe  Constipation:                   [ ]Mild [ ]Moderate [ ]Severe  Diarrhea:                          [ ]Mild [ ]Moderate [ ]Severe      PCSSQ[Palliative Care Spiritual Screening Question]   Severity (0-10):  Score of 4 or > indicate consideration of Chaplaincy referral.  Chaplaincy Referral: [ ] yes [ ] refused [ ] following [ x] deferred    Caregiver [place]? : [ ] yes [ ] no [ ] Declined   [x ] Deferred            Social work referral [ ] Patient & Family Centered Care Referral [ ]     Anticipatory Grief present?:  [ ] yes [ ] no  [x ] Deferred                  Social work referral [ ] Chaplaincy Referral[ ] Caregiver Support Referral [ ]     Other Symptoms:  [ ]All other review of systems negative   [ x] Unable to obtain due to poor mentation     PHYSICAL EXAM:  Vital Signs Last 24 Hrs  T(C): 36.3 (10 Ramos 2025 16:04), Max: 37.1 (10 Ramos 2025 05:12)  T(F): 97.4 (10 Ramos 2025 16:04), Max: 98.7 (10 Ramos 2025 05:12)  HR: 84 (10 Ramos 2025 16:04) (83 - 97)  BP: 128/81 (10 Ramos 2025 16:04) (125/73 - 156/71)  BP(mean): --  RR: 18 (10 Ramos 2025 16:04) (17 - 19)  SpO2: 100% (10 Ramos 2025 16:04) (95% - 100%)    Parameters below as of 10 Ramos 2025 16:04  Patient On (Oxygen Delivery Method): room air         I&O's Summary    09 Jun 2025 07:01  -  10 Ramos 2025 07:00  --------------------------------------------------------  IN: 720 mL / OUT: 0 mL / NET: 720 mL        GENERAL:  [x ]Alert  [ ]Oriented x   [ ]Lethargic  [ ]Cachexia  [ ]Unarousable  [ ]Verbal  [x ]Non-Verbal  [x ] No Distress  Behavioral:   [ ] Anxiety  [ ] Delirium [ ] Agitation [ x] Calm  [] Other  HEENT:  [ x]Normal  [ ] Temporal Wasting  [ ]Dry mouth   [ ]ET Tube/Trach  [ ]Oral lesions  [ ] Mucositis  PULMONARY:   [x ]Clear [ ]Tachypnea  [ ]Audible excessive secretions   [ ]Rhonchi        [ ]Right [ ]Left [ ]Bilateral  [ ]Crackles        [ ]Right [ ]Left [ ]Bilateral  [ ]Wheezing     [ ]Right [ ]Left [ ]Bilateral  [ ]Diminished breath sounds [ ]right [ ]left [ ]bilateral  CARDIOVASCULAR:    [ x]Regular [ ]Irregular [ ]Tachy  [ ]Ford [ ]Murmur [ ]Other  GASTROINTESTINAL:  [x ]Soft  [ ]Distended   [ ]+BS  [x ]Non tender [ ]Tender  [ ]PEG [ ]OGT/ NGT  Last BM: fecal incontinence  GENITOURINARY:  [ ]Normal [ x] Incontinent   [ ]Oliguria/Anuria   [ ]Green  MUSCULOSKELETAL:   [x ]Normal   [ ]Weakness  [ ]Bed/Wheelchair bound [ ]Edema  [  ] amputation  [  ] contraction  NEUROLOGIC: able to follow simple commands   [x ]No focal deficits  [ ]Cognitive impairment  [ ]Dysphagia [ ]Dysarthria [ ]Paresis [ ]Other   SKIN: See Nursing Skin Assessment for further details  [ ]Normal    [ ]Rash  [ ]Pressure ulcer(s)       Present on admission [ ]y [ ]n   [  ]  Wound    [  ] hyperpigmentation    CRITICAL CARE:  [ ] Shock Present  [ ]Septic [ ]Cardiogenic [ ]Neurologic [ ]Hypovolemic  [ ]  Vasopressors [ ]  Inotropes   [ ]Respiratory failure present [ ]Mechanical ventilation [ ]Non-invasive ventilatory support [ ]High flow    [ ]Acute  [ ]Chronic [ ]Hypoxic  [ ]Hypercarbic [ ]Other  [ ]Other organ failure     LABS:  reviewed                         12.4   6.13  )-----------( 162      ( 10 Ramos 2025 00:58 )             37.9   06-10    142  |  108[H]  |  12  ----------------------------<  100[H]  4.2   |  22  |  0.81    Ca    9.7      10 Ramos 2025 00:58  Phos  2.5     06-10  Mg     2.00     06-10      Urinalysis Basic - ( 10 Ramos 2025 00:58 )    Color: x / Appearance: x / SG: x / pH: x  Gluc: 100 mg/dL / Ketone: x  / Bili: x / Urobili: x   Blood: x / Protein: x / Nitrite: x   Leuk Esterase: x / RBC: x / WBC x   Sq Epi: x / Non Sq Epi: x / Bacteria: x      CAPILLARY BLOOD GLUCOSE          RADIOLOGY & ADDITIONAL STUDIES: reviewed     PROTEIN CALORIE MALNUTRITION PRESENT: [ ]mild [ ]moderate [ ]severe [ ]underweight [ ]morbid obesity  https://www.andeal.org/vault/2440/web/files/ONC/Table_Clinical%20Characteristics%20to%20Document%20Malnutrition-White%20JV%20et%20al%583889.pdf      Weight (kg): 99.8 (06-05-25 @ 13:39)    [ ]PPSV2 < or = to 30% [ ]significant weight loss  [ ]poor nutritional intake  [ ]anasarca [ ]Artificial Nutrition      REFERRALS:   [ ]Chaplaincy  [ ]Hospice  [ ]Child Life  [ ]Social Work  [x ]Case management [ ]Holistic Therapy

## 2025-06-10 NOTE — CONSULT NOTE ADULT - PROBLEM SELECTOR RECOMMENDATION 9
- CT Head 6/5 - Acute parenchymal hemorrhage  - MRI Head 6/9 - Hemorrhage involving the superior aspect of the left cerebellar region is seen as well as enhancing lesion involving the right cerebellar and mena  - CT Head 6/10 - Evolving hemorrhagic infarct in the left cerebellum which is recent. Subtle mass effect on the fourth ventricle. No gross change compared with 6/6/2025. Recent smaller right cerebellar infarct. Old appearing infarct   left MCA territory. Left MCA stent appreciated. Old right parietal infarct in the form of encephalomalacia appreciated as well  - Neurology recommendations appreciated  - management as per primary team

## 2025-06-10 NOTE — CHART NOTE - NSCHARTNOTEFT_GEN_A_CORE
MRI brain w/w/o contrast obtained; report as follows:     "Extensive parenchymalvolume loss and chronic microvascular ischemic   changes are identified    Areas of encephalomalacia and gliosis involving the left frontal and   right parietal region is seen which is compatible with areas of old   infarcts    There is evidence of subtle area of T1 shortening seen involving the   superior aspect of the left cerebellar region which corresponds to areas   of acute parenchymal hemorrhage seen on prior head CT. There is also   evidence of associated enhancement seen which is worrisome for an   underlying hemorrhagic neoplasm. In addition there is also evidence of a   peripherally enhancing lesion seen involving the right cerebellar region   (11-40). This could be compatible with a enhancing neoplasm such as   metastasis though the possibility of underlying infarct cannot be totally   excluded given the associated restricted diffusion.    Subtle enhancing lesions are suspected involving the central mena   (10-10). These findings could be compatible with a capillary   telangiectasia though underlying lesion such as metastasis cannot be   totally excluded.    Clinical correlation and continued close interval follow-up is recommended    Mild enlargement of the pituitary gland is identified (1.1 cm in height).   There is alsoevidence of decreased enhancement involving the posterior   aspect of the pituitary gland which could be compatible with a pars   intermedius cyst though the possibility of a Rathke's cleft cyst or   pituitary microadenoma cannot be entirely excluded. Dedicated imaging of   the sella turcica with contrast enhanced MRI can be done if clinically   indicated    The large vessels demonstrate normal flow voids    Right maxillary polyp versus retention cyst is seen    Mild inflammatory change is seen involving the right mastoid region.    IMPRESSION: Hemorrhage involving the superior aspect of the left   cerebellar region is seen as well as enhancing lesion involving the right   cerebellar and mena as described above."      Impression: Presented initially to the ED after concern for GI bleed and loss of consciousness, CTH showed LEFT cerebellar ICH, and then repeat CTH concern for acute R cerebellar infarct. Mechanism if ICH likely related to anticoagulation (possibly with component of HTN). Mechanism of acute R cerebellar infarct may be cardioembolic d/t Afib. While there is a possibility of the R cerebellar lesion being non-ischemic, it is less likely in this instance as subacute infarcts often times have contrast enhancement without significant surrounding edema. An underlying neoplastic lesion can be further screened for in repeat imaging.     Recommendations     [] Repeat MRI brain w/w/o contrast in 4-6 weeks to evaluate evolution  [] Can start on ASA 81 mg qd today from neurovascular standpoint   [] At the 4 week gretchen, can restart on home Apixaban; at that time, discontinue the ASA 81 mg.  [] Watchman device to be considered by family on outpatient basis   [] Atorvastatin 40 mg daily titrated per LDL < 70  [] Stroke neuro-checks and VS q4h  [] Blood pressure goals <140/90   [] PT/OT - ANA  [] Regular diet as tolerated   [] HgA1C goals < 7  [] Lifestyle modifications: smoking cessation, exercise, and a Mediterranean style diet.   [] Patient should follow up with Stroke NP, Luli Gorman or Eli Crouch, in clinic at [address] [place] [address]. Please email [email or web address] w/ basic PHI.     Patient case discussed with stroke fellow, Dr. Ho, and stroke attending, Dr. Farr.     Please call with questions: [id number] MRI brain w/w/o contrast obtained; report as follows:     "Extensive parenchymalvolume loss and chronic microvascular ischemic   changes are identified    Areas of encephalomalacia and gliosis involving the left frontal and   right parietal region is seen which is compatible with areas of old   infarcts    There is evidence of subtle area of T1 shortening seen involving the   superior aspect of the left cerebellar region which corresponds to areas   of acute parenchymal hemorrhage seen on prior head CT. There is also   evidence of associated enhancement seen which is worrisome for an   underlying hemorrhagic neoplasm. In addition there is also evidence of a   peripherally enhancing lesion seen involving the right cerebellar region   (11-40). This could be compatible with a enhancing neoplasm such as   metastasis though the possibility of underlying infarct cannot be totally   excluded given the associated restricted diffusion.    Subtle enhancing lesions are suspected involving the central mena   (10-10). These findings could be compatible with a capillary   telangiectasia though underlying lesion such as metastasis cannot be   totally excluded.    Clinical correlation and continued close interval follow-up is recommended    Mild enlargement of the pituitary gland is identified (1.1 cm in height).   There is alsoevidence of decreased enhancement involving the posterior   aspect of the pituitary gland which could be compatible with a pars   intermedius cyst though the possibility of a Rathke's cleft cyst or   pituitary microadenoma cannot be entirely excluded. Dedicated imaging of   the sella turcica with contrast enhanced MRI can be done if clinically   indicated    The large vessels demonstrate normal flow voids    Right maxillary polyp versus retention cyst is seen    Mild inflammatory change is seen involving the right mastoid region.    IMPRESSION: Hemorrhage involving the superior aspect of the left   cerebellar region is seen as well as enhancing lesion involving the right   cerebellar and mena as described above."      Impression: Presented initially to the ED after concern for GI bleed and loss of consciousness, CTH showed LEFT cerebellar ICH, and then repeat CTH concern for acute R cerebellar infarct. Mechanism if ICH likely related to anticoagulation (possibly with component of HTN). Mechanism of acute R cerebellar infarct may be cardioembolic d/t Afib. While there is a possibility of the R cerebellar lesion being non-ischemic, it is less likely in this instance as subacute infarcts often times have contrast enhancement without significant surrounding edema. An underlying neoplastic lesion can be further screened for in repeat imaging.     Recommendations     [] Repeat MRI brain w/w/o contrast in 4-6 weeks to evaluate evolution  [] Can start on ASA 81 mg qd today from neurovascular standpoint   [] At the 4 week gretchen, can restart on home Apixaban; at that time, discontinue the ASA 81 mg.  [] Watchman device to be considered by family on outpatient basis   [] Atorvastatin 40 mg daily titrated per LDL < 70  [] Stroke neuro-checks and VS q4h  [] Blood pressure goals <140/90   [] PT/OT - ANA  [] Regular diet as tolerated   [] HgA1C goals < 7  [] Lifestyle modifications: smoking cessation, exercise, and a Mediterranean style diet.   [] Patient should follow up with Stroke NP, Luli Gorman or Eli Crouch, in clinic at [address] [place] [address]. Please email [email or web address] w/ basic PHI.     Recommendations including neurochecks & BP parameters discussed with medicine ACP.  Patient case discussed with stroke fellow, Dr. Ho, and stroke attending, Dr. Farr.     Please call with questions: [id number] MRI brain w/w/o contrast obtained; report as follows:     "Extensive parenchymalvolume loss and chronic microvascular ischemic   changes are identified    Areas of encephalomalacia and gliosis involving the left frontal and   right parietal region is seen which is compatible with areas of old   infarcts    There is evidence of subtle area of T1 shortening seen involving the   superior aspect of the left cerebellar region which corresponds to areas   of acute parenchymal hemorrhage seen on prior head CT. There is also   evidence of associated enhancement seen which is worrisome for an   underlying hemorrhagic neoplasm. In addition there is also evidence of a   peripherally enhancing lesion seen involving the right cerebellar region   (11-40). This could be compatible with a enhancing neoplasm such as   metastasis though the possibility of underlying infarct cannot be totally   excluded given the associated restricted diffusion.    Subtle enhancing lesions are suspected involving the central mena   (10-10). These findings could be compatible with a capillary   telangiectasia though underlying lesion such as metastasis cannot be   totally excluded.    Clinical correlation and continued close interval follow-up is recommended    Mild enlargement of the pituitary gland is identified (1.1 cm in height).   There is alsoevidence of decreased enhancement involving the posterior   aspect of the pituitary gland which could be compatible with a pars   intermedius cyst though the possibility of a Rathke's cleft cyst or   pituitary microadenoma cannot be entirely excluded. Dedicated imaging of   the sella turcica with contrast enhanced MRI can be done if clinically   indicated    The large vessels demonstrate normal flow voids    Right maxillary polyp versus retention cyst is seen    Mild inflammatory change is seen involving the right mastoid region.    IMPRESSION: Hemorrhage involving the superior aspect of the left   cerebellar region is seen as well as enhancing lesion involving the right   cerebellar and mena as described above."      Impression: Presented initially to the ED after concern for GI bleed and loss of consciousness, CTH showed LEFT cerebellar ICH, and then repeat CTH concern for acute R cerebellar infarct. Mechanism if ICH likely related to anticoagulation (possibly with component of HTN). Mechanism of acute R cerebellar infarct may be cardioembolic d/t Afib. While there is a possibility of the R cerebellar lesion being non-ischemic, it is less likely in this instance as subacute infarcts often times have contrast enhancement without significant surrounding edema. An underlying neoplastic lesion can be further screened for in repeat imaging.     Recommendations     [] Repeat MRI brain w/w/o contrast in 4-6 weeks to evaluate evolution  [] Can start on ASA 81 mg qd today from neurovascular standpoint   [] At the 4 week gretchen, can restart on home Apixaban; at that time, discontinue the ASA 81 mg.  [] Watchman device to be considered by family on outpatient basis   [] Atorvastatin 40 mg daily titrated per LDL < 70  [] Stroke neuro-checks and VS q4h  [] Blood pressure goals <140/90   [] PT/OT - ANA  [] Regular diet as tolerated   [] HgA1C goals < 7  [] Lifestyle modifications: smoking cessation, exercise, and a Mediterranean style diet.   [] Patient should follow up with Stroke NP, Luli Gorman or Eli Crouch, in clinic at [address] [place] [address]. Please email [email or web address] w/ basic PHI.     Spoke with daughter, Emma, over the phone regarding workup, diagnoses and management. All questions answered.  Recommendations including neurochecks & BP parameters discussed with medicine ACP.  Patient case discussed with stroke fellow, Dr. Ho, and stroke attending, Dr. Farr.     Please call with questions: [id number] MRI brain w/w/o contrast obtained; report as follows:     "Extensive parenchymalvolume loss and chronic microvascular ischemic   changes are identified    Areas of encephalomalacia and gliosis involving the left frontal and   right parietal region is seen which is compatible with areas of old   infarcts    There is evidence of subtle area of T1 shortening seen involving the   superior aspect of the left cerebellar region which corresponds to areas   of acute parenchymal hemorrhage seen on prior head CT. There is also   evidence of associated enhancement seen which is worrisome for an   underlying hemorrhagic neoplasm. In addition there is also evidence of a   peripherally enhancing lesion seen involving the right cerebellar region   (11-40). This could be compatible with a enhancing neoplasm such as   metastasis though the possibility of underlying infarct cannot be totally   excluded given the associated restricted diffusion.    Subtle enhancing lesions are suspected involving the central mena   (10-10). These findings could be compatible with a capillary   telangiectasia though underlying lesion such as metastasis cannot be   totally excluded.    Clinical correlation and continued close interval follow-up is recommended    Mild enlargement of the pituitary gland is identified (1.1 cm in height).   There is alsoevidence of decreased enhancement involving the posterior   aspect of the pituitary gland which could be compatible with a pars   intermedius cyst though the possibility of a Rathke's cleft cyst or   pituitary microadenoma cannot be entirely excluded. Dedicated imaging of   the sella turcica with contrast enhanced MRI can be done if clinically   indicated    The large vessels demonstrate normal flow voids    Right maxillary polyp versus retention cyst is seen    Mild inflammatory change is seen involving the right mastoid region.    IMPRESSION: Hemorrhage involving the superior aspect of the left   cerebellar region is seen as well as enhancing lesion involving the right   cerebellar and mena as described above."      [facility] then obtained, report as follows:     "Evolving hemorrhagic infarct in the left cerebellum which is recent.   Subtle mass effect on the fourth ventricle. No gross change compared with   6/6/2025. Recent smaller right cerebellar infarct. Old appearing infarct   left MCA territory. Left MCA stent appreciated. Old right parietal   infarct in the form of encephalomalacia appreciated as well"      Impression: Overall patient with stable and possibly improving L cerebellar IPH & R cerebellar infarct, though has chronic severe disability from prior strokes. Presented initially to the ED after concern for GI bleed and loss of consciousness, CTH showed LEFT cerebellar ICH, and then repeat CTH concern for acute R cerebellar infarct. Mechanism if ICH likely related to anticoagulation (possibly with component of HTN). Mechanism of acute R cerebellar infarct may be cardioembolic d/t Afib. While there is a possibility of the R cerebellar lesion being non-ischemic, it is less likely in this instance as subacute infarcts often times have contrast enhancement without significant surrounding edema. An underlying neoplastic lesion can be further screened for in repeat imaging.     Recommendations     [] Repeat MRI brain w/w/o contrast in 4-6 weeks to evaluate evolution  [] Can start on ASA 81 mg qd today from neurovascular standpoint   [] At the 4 week gretchen, can restart on home Apixaban; at that time, discontinue the ASA 81 mg.  [] Watchman device to be considered by family on outpatient basis   [] Atorvastatin 40 mg daily titrated per LDL < 70  [] Stroke neuro-checks and VS q4h  [] Blood pressure goals <140/90   [] PT/OT - ANA  [] Regular diet as tolerated   [] HgA1C goals < 7  [] Lifestyle modifications: smoking cessation, exercise, and a Mediterranean style diet.   [] Patient should follow up with Stroke NP, Luli Gorman or Eli Crouch, in clinic at [address] [place] [address]. Please email [email or web address] w/ basic PHI.     Spoke with daughter, Emma, over the phone regarding workup, diagnoses and management. All questions answered.  Recommendations including neurochecks & BP parameters discussed with medicine ACP.  Patient case discussed with stroke fellow, Dr. Ho, and stroke attending, Dr. Farr.     Please call with questions: [id number]

## 2025-06-10 NOTE — PROGRESS NOTE ADULT - PROBLEM SELECTOR PLAN 7
- Continue home Lacosamide 100mg BID

## 2025-06-10 NOTE — GOALS OF CARE CONVERSATION - ADVANCED CARE PLANNING - CONVERSATION DETAILS
Cintia Stewart (Granddaughter)   Venite Dorsaint, Mireille Dorsaint, Delano Dorsaint Patient has 4 children. Patient lives at home with daughter Emma, who is a . Since her stroke in 2020, patient has been nonverbal but attempts at times to say one worded answers such as yes (but family say she'll say yes but not mean yes sometimes) and use hand gestures to indicate her needs. She was able to ambulate with walker and assistance from family. She needed help with ADLs.   She was recently hospitalized and since that hospitalization, Emma shares patient has been weak.     Emma shared events leading to current hospitalization and verbalized good understanding of current hospital course including findings of cerebral hemorrhage.     Emma states that Cintia (patient's granddaughter) and Elva (patient's daughter, who is a RN) are patient's current healthcare proxies. Called Cintia who did not have a copy of the HCP paperwork at this time but will need to find it. They will attempt to bring a copy when able to    Counseled/educated family about advanced care planning for code status preferences including option of resuscitative interventions of CPR/ mechanical ventilation vs allowing for natural death in event of cardiopulmonary arrest.   Counseled /educated that DNR/DNI status does not preclude medical workup/interventions.     Emma states that they had family discussions with all her siblings recently and family feels that resuscitative interventions would be burdensome and instead wish for patient to pass away naturally. Cintia confirmed this as well.   Reached out to other children with Emma via Emma' cellphone including Juanite Dorsaint, Mireille Dorsaint, Delano Dorsaint who confirm wishes for patient to be DNR/DNI.     Afterwards, spoke with daughter Emma that patient with good po intake at this time but for family to continue ongoing discussions in the future in the event that patient develops dysphagia if artificial nutrition would be aligned with goals of care. Emma states that family likely would be open to PEG if necessary as patient had PEG in the past due to CVA but it was reversed when her dysphagia improved.     MEET completed to reflect: DNR/DNI. Trial of non-invasive ventilation    Emma shares family's wishes for patient to go home rather than to rehab with goal for home PT and follow up with repeat head imaging in a few weeks to monitor cerebellar hemorrhage. She shares patient does better when with family than in rehab setting. Discussed that in the future, if patient declines further and if aligned with family's goals of care could potentially have patient evaluated for home hospice services at that time. Emma appreciative of information.

## 2025-06-10 NOTE — PROGRESS NOTE ADULT - PROBLEM SELECTOR PLAN 5
BP elevated  - Holding all home  Enalapril, HCTZ.   - resume metoprolol   - Monitor vitals q4h
BP at goal   - Holding all home anti-hypertensive agents (Metoprolol, Enalapril, HCTZ) for now  - Monitor vitals q4h  - BP currently stable
- Holding all home anti-hypertensive agents (Metoprolol, Enalapril, HCTZ) for now  - Monitor vitals q4h  - BP currently stable
BP elevated  - Holding all home  Enalapril, HCTZ.   - resume metoprolol   - Monitor vitals q4h
BP elevated  - Holding all home  Enalapril, HCTZ.   -resume metoprolol   - Monitor vitals q4h

## 2025-06-10 NOTE — PROGRESS NOTE ADULT - PROBLEM SELECTOR PLAN 9
DVT prophylaxis: lovenox   Diet: Regular  Contact: daughterEmma ([phone]), updated via phone    Disposition: PT rec ANA, however family requesting DC home w hospice, Palliative consulted, potential DC in next 24-48 hours

## 2025-06-10 NOTE — PROGRESS NOTE ADULT - PROBLEM SELECTOR PROBLEM 3
RJ (acute kidney injury)

## 2025-06-10 NOTE — PROGRESS NOTE ADULT - PROBLEM SELECTOR PLAN 4
- Holding home Metoprolol tartrate 50mg BID and Eliquis5 mg BID  - Monitor on Tele  - Troponins negative x 2
- Holding home Metoprolol tartrate 50mg BID and Eliquis5 mg BID  - Monitor on Tele  - Troponins negative x 2  - with pauses on tele, EP consulted for watchman
- Holding home Metoprolol tartrate 50mg BID and Eliquis5 mg BID  - Monitor on Tele  - Troponins negative x 2
- Holding home Metoprolol tartrate 50mg BID and Eliquis5 mg BID  - Monitor on Tele  - Troponins negative x 2
- Holding home Metoprolol tartrate 50mg BID and Eliquis5 mg BID  - Monitor on Tele  - Troponins negative x 2  - Per EP, no plans for watchman at this time, signed off

## 2025-06-10 NOTE — PROGRESS NOTE ADULT - PROBLEM SELECTOR PLAN 8
- H/o prior CVA in 2020 with residual right-sided weakness, non-verbal at baseline, normally ambulates with a walker  - Aspirin 81mg qd discontinued by pt's Neurologist   - Continue home Atorvastatin 40mg qhs
- H/o prior CVA in 2020 with residual right-sided weakness, non-verbal at baseline, normally ambulates with a walker  - Aspirin 81mg qd discontinued by pt's Neurologist   - Continue home Atorvastatin 40mg qhs  - ASA reinitiated
- H/o prior CVA in 2020 with residual right-sided weakness, non-verbal at baseline, normally ambulates with a walker  - Aspirin 81mg qd discontinued by pt's Neurologist   - Continue home Atorvastatin 40mg qhs  - Repeat CTH prior to initiating ASA81 again

## 2025-06-10 NOTE — CONSULT NOTE ADULT - PAIN ASSESSMENT ADVANCED DEMENTIA: BREATHING
PAST MEDICAL HISTORY:  Dilated cardiomyopathy     GERD without esophagitis     Heart transplanted      Normal

## 2025-06-10 NOTE — PROGRESS NOTE ADULT - ASSESSMENT
84yoF w/ PMHx CVA in 2020, afib on Eliquis, HTN, HLD, seizure disorder initially presented to the ED with BRBPR and was subsequently unresponsive.  Was found by EMS to be hypotensive 60/40s.  H&H was lower than her baseline 13.2 from 14.1 and was given PRBC.  CTA A/P show no evidence of active GI bleed.  CTH showed acute parenchymal hemorrhage with some surrounding edema.  Repeat CTH stable.  AC was discontinued.  EP consulted for watchman workup.    Discussed with patient and her family regarding a Watchman device.     Continue to monitor  Maintain K>4 and Mg>2   Possible Watchman placement as outpatient  Continue management as per primary team  84yoF w/ PMHx CVA in 2020, afib on Eliquis, HTN, HLD, seizure disorder initially presented to the ED with BRBPR and was subsequently unresponsive.  Was found by EMS to be hypotensive 60/40s.  H&H was lower than her baseline 13.2 from 14.1 and was given PRBC.  CTA A/P show no evidence of active GI bleed.  CTH showed acute parenchymal hemorrhage with some surrounding edema.  Repeat CTH stable.  AC was discontinued.  EP consulted for watchman workup.    Considering patient's comorbidities, recent acute Lt cerebellar parenchymal hemorrhage, acute Rt Cerebellar CVA, and mental status, she is unlikely a candidate for Watchman device.    Continue to monitor  Maintain K>4 and Mg>2   No plan for Watchman device at this time  Continue management as per primary team   EP will sign off

## 2025-06-10 NOTE — PROGRESS NOTE ADULT - PROBLEM SELECTOR PLAN 1
-In setting of syncopal episode 2/2 GIB and hypotension  -CTH: "There is acute parenchymal hemorrhage with some surrounding edema seen involving the left cerebellar region which measures approximately 3.5 x 2.9 x 3.0 cm. This finding does cause localized mass effect. No significant shift or herniation is seen."  -s/p PCC on 6/5  -Repeat CTH: "No change since 10:47 AM. Left superior cerebellar parenchymal hemorrhage with mild vasogenic edema. Lucency right cerebellum suspicious for recent infarct. Old left frontal and right parietal cortical infarcts. Left M1/M2 stent."  -repeat CTH 6/6 stable.   -q4h Neurochecks  -Syncope workup: trops negative x2, Monitoring on Telemetry, TTE WNL,  -neurosurgery consult appreciated- hold all AC/antiplatelet for 2 weeks. repeat CTH prior to starting ac and in 48hr after starting ac   -Neuro consult appreciated- MRI brain ordered. will consult EP for possible watchman on Mon
-In setting of syncopal episode 2/2 GIB and hypotension  -CTH: "There is acute parenchymal hemorrhage with some surrounding edema seen involving the left cerebellar region which measures approximately 3.5 x 2.9 x 3.0 cm. This finding does cause localized mass effect. No significant shift or herniation is seen."  -s/p PCC on 6/5  -Repeat CTH: "No change since 10:47 AM. Left superior cerebellar parenchymal hemorrhage with mild vasogenic edema. Lucency right cerebellum suspicious for recent infarct. Old left frontal and right parietal cortical infarcts. Left M1/M2 stent."  -repeat CTH 6/6 stable.   -q4h Neurochecks  -Syncope workup: trops negative x2, Monitoring on Telemetry, TTE WNL,  -neurosurgery consult appreciated- hold all AC/antiplatelet for 2 weeks. repeat CTH prior to starting ac and in 48hr after starting ac   -Neuro evaluated, rec repeat CTH, if stable resume [id number]; defer MRI; consider watchman, will consult EP  -swallow eval- regular diet
-In setting of syncopal episode 2/2 GIB and hypotension  -CTH: "There is acute parenchymal hemorrhage with some surrounding edema seen involving the left cerebellar region which measures approximately 3.5 x 2.9 x 3.0 cm. This finding does cause localized mass effect. No significant shift or herniation is seen."  -NSGY following  -s/p PCC on 6/5  -Repeat CTH: "No change since 10:47 AM. Left superior cerebellar parenchymal hemorrhage with mild vasogenic edema. Lucency right cerebellum suspicious for recent infarct. Old left frontal and right parietal cortical infarcts. Left M1/M2 stent."  -q4h Neurochecks  -Syncope workup: trops negative x2, Monitoring on Telemetry, TTE WNL, orthostatics pending stable CTH and NSGY clearance (currently on bedrest)  -Pending repeat CTH today  -Neuro consult
-In setting of syncopal episode 2/2 GIB and hypotension  -CTH: "There is acute parenchymal hemorrhage with some surrounding edema seen involving the left cerebellar region which measures approximately 3.5 x 2.9 x 3.0 cm. This finding does cause localized mass effect. No significant shift or herniation is seen."  -s/p PCC on 6/5  -Repeat CTH: "No change since 10:47 AM. Left superior cerebellar parenchymal hemorrhage with mild vasogenic edema. Lucency right cerebellum suspicious for recent infarct. Old left frontal and right parietal cortical infarcts. Left M1/M2 stent."  -repeat CTH 6/6 stable.   -q4h Neurochecks  -Syncope workup: trops negative x2, Monitoring on Telemetry, TTE WNL,  -neurosurgery consult appreciated- hold all AC for 2 weeks. repeat CTH prior to starting ac and in 48hr after starting ac   -swallow eval- regular diet    Per Neuro, ordered repeat CT H, based on imaging OK to resume ASA. At 4 weeks, switch from ASA to AC.   Per EP, no plans for watchman at this time, signed off  PT rec ANA, however family requesting DC home w hospice, Palliative consulted
-In setting of syncopal episode 2/2 GIB and hypotension  -CTH: "There is acute parenchymal hemorrhage with some surrounding edema seen involving the left cerebellar region which measures approximately 3.5 x 2.9 x 3.0 cm. This finding does cause localized mass effect. No significant shift or herniation is seen."  -s/p PCC on 6/5  -Repeat CTH: "No change since 10:47 AM. Left superior cerebellar parenchymal hemorrhage with mild vasogenic edema. Lucency right cerebellum suspicious for recent infarct. Old left frontal and right parietal cortical infarcts. Left M1/M2 stent."  -repeat CTH 6/6 stable.   -q4h Neurochecks  -Syncope workup: trops negative x2, Monitoring on Telemetry, TTE WNL,  -neurosurgery consult appreciated- hold all AC/antiplatelet for 2 weeks. repeat CTH prior to starting ac and in 48hr after starting ac   -Neuro consult appreciated- MRI brain ordered. will consult EP for possible watchman on Mon  -swallow eval- regular diet

## 2025-06-10 NOTE — PROGRESS NOTE ADULT - PROBLEM SELECTOR PLAN 6
- Continue home Atorvastatin 40mg qhs

## 2025-06-10 NOTE — PROGRESS NOTE ADULT - PROBLEM SELECTOR PLAN 3
- Elevated BUN / Cr: 18/1.47 on admission, unknown baseline Cr  - S/p 1 L NS bolus in the ER -> Cr downtrended to 0.9. Likely prerenal  - Monitor BMP daily; trend Cr  - Check bladder scan  - Avoid nephrotoxic agents  - Renally dose medications    Improved
- Elevated BUN / Cr: 18/1.47 on admission, unknown baseline Cr  - S/p 1 L NS bolus in the ER -> Cr downtrended to 0.9. Likely prerenal  - Na improved  - Avoid nephrotoxic agents  - Renally dose medications
- Elevated BUN / Cr: 18/1.47 on admission, unknown baseline Cr  - S/p 1 L NS bolus in the ER -> Cr downtrended to 0.9. Likely prerenal  - Na elevated today as pt is NPO- started gentle IVF   - Avoid nephrotoxic agents  - Renally dose medications
- Elevated BUN / Cr: 18/1.47 on admission, unknown baseline Cr  - S/p 1 L NS bolus in the ER -> Cr downtrended to 0.9. Likely prerenal  - Na improved  - Avoid nephrotoxic agents  - Renally dose medications
- Elevated BUN / Cr: 18/1.47 on admission, unknown baseline Cr  - S/p 1 L NS bolus in the ER -> Cr downtrended to 0.9. Likely prerenal  - Na elevated today as pt is NPO- started gentle IVF   - Avoid nephrotoxic agents  - Renally dose medications

## 2025-06-10 NOTE — CONSULT NOTE ADULT - ASSESSMENT
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

## 2025-06-10 NOTE — PROGRESS NOTE ADULT - PROBLEM SELECTOR PROBLEM 1
Cerebral parenchymal hemorrhage

## 2025-06-10 NOTE — CONSULT NOTE ADULT - NS ATTEST RISK PROBLEM GEN_ALL_CORE FT
1. Number and complexity of problems addressed for this patient:    1.1 Moderate (At least 1)  [ ] 1 or more chronic illnesses with exacerbation, progression, or side effects of treatment  [ ] 2 or more stable chronic illnesses  [ ] 1 undiagnosed new problem with uncertain prognosis  [ ] 1 acute illness with systemic symptoms  [ ] 1 acute complicated injury  1.2 High (At least 1)   [ ] 1 or more chronic illnesses with severe exacerbation, progression, or side effects of treatment  [x ] 1 acute or chronic illnesses or injuries that may pose a threat to life or bodily function    2. Amount and/or Complexity of Data that was Reviewed and Analyzed for this case:       Moderate (1 out of 3)       High (2 out of 3)  2.1. (Any combination of 3 of the following)   [ x] Prior External notes were reviewed  [ x] Each test result was reviewed (see "LABS" and "RADIOLOGY & ADDITIONAL STUDIES" above)  [ ] The following tests were ordered and/or reviewed (Only count 1 point for ordering or reviewing a unique test):  	[ ]CBC  	[ ] Chemistry   	[ ] Imaging   	[ ] Other:   [ x] Assessment requiring an independent historian   		Name of historian and relationship: Bedside RN and family   2.2  [ ] Personally review and interpretation of  image or testing   2.3  [x ] Discussion of management or test interpretation with external physician/other qualified health care professional\appropriate source (not separately reported)    3. Risk of Complications and/or Morbidity or Mortality of for this Patient’s Management:  3.1 Moderate risk of morbidity from additional diagnostic testing or treatment (At least 1):   [ ] Prescription drug management   [ ] Decision regarding minor surgery, treatment, or procedure with identified patient or procedure risk factors  [ ] Decision regarding elective major surgery, treatment, or procedure without identified patient or procedure risk factors   [ ] Diagnosis or treatment significantly limited by social determinants of health   [ ] Other:   3.2 High risk of morbidity from additional diagnostic testing or treatment (At least 1):   [ ] Drug therapy requiring intensive monitoring for toxicity   [ ] Decision regarding elective major surgery, treatment, or procedure with identified patient or procedure risk factors   [ ] Decision regarding emergency major surgery, treatment, or procedure   [ ] Decision regarding hospitalization or escalation of hospital-level of care  [ x] Decision not to resuscitate, not to intubate  [ ] Decision to proceed or not with artificial nutrition   [ ] Parenteral controlled substance  [ ] Other:

## 2025-06-10 NOTE — CONSULT NOTE ADULT - PROBLEM SELECTOR RECOMMENDATION 5
Counseled/educated Emma about advanced care planning for code status preferences including option of resuscitative interventions of CPR/ mechanical ventilation vs allowing for natural death in event of cardiopulmonary arrest.   Counseled /educated that DNR/DNI status does not preclude medical workup/interventions.   Emma states that they had family discussions with all her siblings recently and family feels that resuscitative interventions would be burdensome and instead wish for patient to pass away naturally. Cintia confirmed this as well.   Reached out to other children with Emma via Emma' cellphone including Venite Dorsaint, Mireille Dorsaint, Delano Dorsaint who confirm wishes for patient to be DNR/DNI.  Afterwards, spoke with daughter Emma that patient with good po intake at this time but for family to continue ongoing discussions in the future in the event that patient develops dysphagia if artificial nutrition would be aligned with goals of care. Emma states that family likely would be open to PEG if necessary as patient had PEG in the past due to CVA but it was reversed when her dysphagia improved.   MOLST completed to reflect: DNR/DNI. Trial of non-invasive ventilation  See C note  46 minutes spent on advanced care planning/ goals of care

## 2025-06-10 NOTE — CONSULT NOTE ADULT - PROBLEM SELECTOR RECOMMENDATION 6
Case reviewed with primary team  Thank you for allowing us to participate in your patient's care. Please page [phone] for any questions/concerns.

## 2025-06-10 NOTE — PROGRESS NOTE ADULT - PROBLEM SELECTOR PLAN 2
- Pt presented to the ED c/o 1 episode of large dark red blood clot per ER note, BRBPR per other notes  - CTA A/P: "Rectal wall thickening with perirectal edema. Findings suggests proctitis. There is no CT evidence of active GI bleeding."  - GI consulted, now signed off: DDx includes rectal ulcer vs stercoral colitis vs diverticular. Continue bowel regimen.  - Discussed eliquis with dtr on 6/6- given risks with bleeding, will discontinue indefinitely  - HOLD all NSAIDs, antiplatelets, and anticoagulants  - NPO for now
- Pt presented to the ED c/o 1 episode of large dark red blood clot per ER note, BRBPR per other notes  - CTA A/P: "Rectal wall thickening with perirectal edema. Findings suggests proctitis. There is no CT evidence of active GI bleeding."  - GI consulted, now signed off: DDx includes rectal ulcer vs stercoral colitis vs diverticular. Continue bowel regimen.  - Discussed eliquis with dtr on 6/6- given risks with bleeding, will discontinue indefinitely  - HOLD all NSAIDs, antiplatelets, and anticoagulants
- Pt presented to the ED c/o 1 episode of large dark red blood clot per ER note, BRBPR per other notes  - CTA A/P: "Rectal wall thickening with perirectal edema. Findings suggests proctitis. There is no CT evidence of active GI bleeding."  - GI consulted, now signed off: DDx includes rectal ulcer vs stercoral colitis vs diverticular. Continue bowel regimen.  - Discussed eliquis with dtr on 6/6- given risks with bleeding, will discontinue indefinitely  - HOLD all NSAIDs, antiplatelets, and anticoagulants
- Pt presented to the ED c/o 1 episode of large dark red blood clot per ER note, BRBPR per other notes  - CTA A/P: "Rectal wall thickening with perirectal edema. Findings suggests proctitis. There is no CT evidence of active GI bleeding."  - GI consulted, now signed off: DDx includes rectal ulcer vs stercoral colitis vs diverticular. Continue bowel regimen.  - Discussed eliquis with dtr on 6/6- given risks with bleeding, will discontinue indefinitely  - HOLD all NSAIDs, antiplatelets, and anticoagulants  - NPO pending swallow eval
- Pt presented to the ED c/o 1 episode of large dark red blood clot per ER note, BRBPR per other notes  - CTA A/P: "Rectal wall thickening with perirectal edema. Findings suggests proctitis. There is no CT evidence of active GI bleeding."  - GI consulted, now signed off: DDx includes rectal ulcer vs stercoral colitis vs diverticular. Continue bowel regimen.  - Discussed eliquis with dtr on 6/6- given risks with bleeding, will discontinue indefinitely  - will monitor as ASA restarted

## 2025-06-11 VITALS
DIASTOLIC BLOOD PRESSURE: 90 MMHG | TEMPERATURE: 98 F | HEART RATE: 67 BPM | SYSTOLIC BLOOD PRESSURE: 120 MMHG | RESPIRATION RATE: 18 BRPM | OXYGEN SATURATION: 99 %

## 2025-06-11 LAB
ANION GAP SERPL CALC-SCNC: 11 MMOL/L — SIGNIFICANT CHANGE UP (ref 7–14)
BASOPHILS # BLD AUTO: 0.03 K/UL — SIGNIFICANT CHANGE UP (ref 0–0.2)
BASOPHILS NFR BLD AUTO: 0.6 % — SIGNIFICANT CHANGE UP (ref 0–2)
BUN SERPL-MCNC: 18 MG/DL — SIGNIFICANT CHANGE UP (ref 7–23)
CALCIUM SERPL-MCNC: 9.7 MG/DL — SIGNIFICANT CHANGE UP (ref 8.4–10.5)
CHLORIDE SERPL-SCNC: 108 MMOL/L — HIGH (ref 98–107)
CO2 SERPL-SCNC: 23 MMOL/L — SIGNIFICANT CHANGE UP (ref 22–31)
CREAT SERPL-MCNC: 1.09 MG/DL — SIGNIFICANT CHANGE UP (ref 0.5–1.3)
EGFR: 50 ML/MIN/1.73M2 — LOW
EGFR: 50 ML/MIN/1.73M2 — LOW
EOSINOPHIL # BLD AUTO: 0.49 K/UL — SIGNIFICANT CHANGE UP (ref 0–0.5)
EOSINOPHIL NFR BLD AUTO: 9.6 % — HIGH (ref 0–6)
GLUCOSE SERPL-MCNC: 95 MG/DL — SIGNIFICANT CHANGE UP (ref 70–99)
HCT VFR BLD CALC: 38.9 % — SIGNIFICANT CHANGE UP (ref 34.5–45)
HGB BLD-MCNC: 12.6 G/DL — SIGNIFICANT CHANGE UP (ref 11.5–15.5)
IANC: 2.56 K/UL — SIGNIFICANT CHANGE UP (ref 1.8–7.4)
IMM GRANULOCYTES NFR BLD AUTO: 0.2 % — SIGNIFICANT CHANGE UP (ref 0–0.9)
LYMPHOCYTES # BLD AUTO: 1.59 K/UL — SIGNIFICANT CHANGE UP (ref 1–3.3)
LYMPHOCYTES # BLD AUTO: 31.1 % — SIGNIFICANT CHANGE UP (ref 13–44)
MAGNESIUM SERPL-MCNC: 2.2 MG/DL — SIGNIFICANT CHANGE UP (ref 1.6–2.6)
MCHC RBC-ENTMCNC: 29 PG — SIGNIFICANT CHANGE UP (ref 27–34)
MCHC RBC-ENTMCNC: 32.4 G/DL — SIGNIFICANT CHANGE UP (ref 32–36)
MCV RBC AUTO: 89.4 FL — SIGNIFICANT CHANGE UP (ref 80–100)
MONOCYTES # BLD AUTO: 0.44 K/UL — SIGNIFICANT CHANGE UP (ref 0–0.9)
MONOCYTES NFR BLD AUTO: 8.6 % — SIGNIFICANT CHANGE UP (ref 2–14)
MRSA PCR RESULT.: SIGNIFICANT CHANGE UP
NEUTROPHILS # BLD AUTO: 2.56 K/UL — SIGNIFICANT CHANGE UP (ref 1.8–7.4)
NEUTROPHILS NFR BLD AUTO: 49.9 % — SIGNIFICANT CHANGE UP (ref 43–77)
NRBC # BLD AUTO: 0 K/UL — SIGNIFICANT CHANGE UP (ref 0–0)
NRBC # FLD: 0 K/UL — SIGNIFICANT CHANGE UP (ref 0–0)
NRBC BLD AUTO-RTO: 0 /100 WBCS — SIGNIFICANT CHANGE UP (ref 0–0)
PHOSPHATE SERPL-MCNC: 3.8 MG/DL — SIGNIFICANT CHANGE UP (ref 2.5–4.5)
PLATELET # BLD AUTO: 182 K/UL — SIGNIFICANT CHANGE UP (ref 150–400)
POTASSIUM SERPL-MCNC: 4.1 MMOL/L — SIGNIFICANT CHANGE UP (ref 3.5–5.3)
POTASSIUM SERPL-SCNC: 4.1 MMOL/L — SIGNIFICANT CHANGE UP (ref 3.5–5.3)
RBC # BLD: 4.35 M/UL — SIGNIFICANT CHANGE UP (ref 3.8–5.2)
RBC # FLD: 14.6 % — HIGH (ref 10.3–14.5)
S AUREUS DNA NOSE QL NAA+PROBE: SIGNIFICANT CHANGE UP
SODIUM SERPL-SCNC: 142 MMOL/L — SIGNIFICANT CHANGE UP (ref 135–145)
WBC # BLD: 5.12 K/UL — SIGNIFICANT CHANGE UP (ref 3.8–10.5)
WBC # FLD AUTO: 5.12 K/UL — SIGNIFICANT CHANGE UP (ref 3.8–10.5)

## 2025-06-11 PROCEDURE — 99239 HOSP IP/OBS DSCHRG MGMT >30: CPT

## 2025-06-11 RX ORDER — SENNA 187 MG
2 TABLET ORAL
Qty: 0 | Refills: 0 | DISCHARGE
Start: 2025-06-11

## 2025-06-11 RX ORDER — METOPROLOL SUCCINATE 50 MG/1
1 TABLET, EXTENDED RELEASE ORAL
Qty: 0 | Refills: 0 | DISCHARGE
Start: 2025-06-11

## 2025-06-11 RX ORDER — ASPIRIN 325 MG
1 TABLET ORAL
Qty: 0 | Refills: 0 | DISCHARGE
Start: 2025-06-11

## 2025-06-11 RX ORDER — METOPROLOL SUCCINATE 50 MG/1
1 TABLET, EXTENDED RELEASE ORAL
Qty: 60 | Refills: 0
Start: 2025-06-11 | End: 2025-07-10

## 2025-06-11 RX ORDER — POLYETHYLENE GLYCOL 3350 17 G/17G
17 POWDER, FOR SOLUTION ORAL
Qty: 0 | Refills: 0 | DISCHARGE
Start: 2025-06-11

## 2025-06-11 RX ORDER — ATORVASTATIN CALCIUM 80 MG/1
1 TABLET, FILM COATED ORAL
Qty: 0 | Refills: 0 | DISCHARGE
Start: 2025-06-11

## 2025-06-11 RX ADMIN — METOPROLOL SUCCINATE 25 MILLIGRAM(S): 50 TABLET, EXTENDED RELEASE ORAL at 05:02

## 2025-06-11 RX ADMIN — Medication 1 APPLICATION(S): at 11:38

## 2025-06-11 RX ADMIN — POLYETHYLENE GLYCOL 3350 17 GRAM(S): 17 POWDER, FOR SOLUTION ORAL at 18:09

## 2025-06-11 RX ADMIN — LACOSAMIDE 100 MILLIGRAM(S): 150 TABLET, FILM COATED ORAL at 05:01

## 2025-06-11 RX ADMIN — ENOXAPARIN SODIUM 40 MILLIGRAM(S): 100 INJECTION SUBCUTANEOUS at 18:09

## 2025-06-11 RX ADMIN — Medication 81 MILLIGRAM(S): at 11:37

## 2025-06-11 RX ADMIN — POLYETHYLENE GLYCOL 3350 17 GRAM(S): 17 POWDER, FOR SOLUTION ORAL at 05:02

## 2025-06-11 RX ADMIN — METOPROLOL SUCCINATE 25 MILLIGRAM(S): 50 TABLET, EXTENDED RELEASE ORAL at 18:09

## 2025-06-11 RX ADMIN — LACOSAMIDE 100 MILLIGRAM(S): 150 TABLET, FILM COATED ORAL at 18:12

## 2025-06-11 NOTE — PROVIDER CONTACT NOTE (OTHER) - SITUATION
pt had a blood tinged bm this morning, blood was on the brighter side.
Patient systolic bp above parameter 145/58

## 2025-06-11 NOTE — DISCHARGE NOTE NURSING/CASE MANAGEMENT/SOCIAL WORK - NSDCPEFALRISK_GEN_ALL_CORE
For information on Fall & Injury Prevention, visit: [email or web address] OR  [email or web address] OR  https://www.cdc.gov/steadi/patient.html

## 2025-06-11 NOTE — PROGRESS NOTE ADULT - ASSESSMENT
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 The pt subsequently lost consciousness for about a minute, as witnessed and reported by pt's daughters. Admitted for GI bleed and syncope workup .     Found to have Cerebral parenchymal hemorrhage. Has been evaluated by GI, neurology, neuroSx, EP and palliative. PT had recommended ANA, however family electing for home with home health care. Plan for discharge today, please see discharge summary for further details.

## 2025-06-11 NOTE — DISCHARGE NOTE NURSING/CASE MANAGEMENT/SOCIAL WORK - FINANCIAL ASSISTANCE
[facility] provides services at a reduced cost to those who are determined to be eligible through [facility]’s financial assistance program. Information regarding [facility]’s financial assistance program can be found by going to [email or web address] or by calling [phone].

## 2025-06-11 NOTE — PROGRESS NOTE ADULT - TIME BILLING
preparing to see the patient (eg. review of tests), obtaining and/or reviewing separately obtained history, obtaining/reviewing vitals, performing a medically appropriate examination and/or evaluation, counseling and educating the patient/family/caregiver, reviewing previous notes and test results, and procedures, communicating with other health professionals (when not separately reported), and documenting clinical information in the electronic health record.
Patient encounter, including chart review, medication review, patient interview, ordering labs and medications, interpreting labs and imaging results, coordination of care with consultants, and discussing plan with patient, dtr at bedside, and healthcare team.
(including, but not limited to)  - preparing to see the patient (eg, review of tests)   - obtaining and/or reviewing separately obtained history  - performing a medically appropriate examination and/or evaluation   - counseling and educating the patient/family/caregiver    - ordering medications, tests, or procedures   - referring and communicating with other health care professionals (when not separately reported)   - documenting clinical information in the electronic or other health record   - independently interpreting results (not separately reported) and communicating results to the patient/family/caregiver   - care coordination (not separately reported)
Left cerebral intracerebral hemorrhage, right cerebellar ischemic infarct  Will wait a few days before starting baby aspirin for secondary stroke prevention  Patient has poor baseline, nonverbal   Stroke team will follow-up with primary medical team regarding treatment recommendations
preparing to see the patient (eg. review of tests), obtaining and/or reviewing separately obtained history, obtaining/reviewing vitals, performing a medically appropriate examination and/or evaluation, counseling and educating the patient/family/caregiver, reviewing previous notes and test results, and procedures, communicating with other health professionals (when not separately reported), and documenting clinical information in the electronic health record.
(including, but not limited to)  - preparing to see the patient (eg, review of tests)   - obtaining and/or reviewing separately obtained history  - performing a medically appropriate examination and/or evaluation   - counseling and educating the patient/family/caregiver    - ordering medications, tests, or procedures   - referring and communicating with other health care professionals (when not separately reported)   - documenting clinical information in the electronic or other health record   - independently interpreting results (not separately reported) and communicating results to the patient/family/caregiver   - care coordination (not separately reported)
(including, but not limited to)  - preparing to see the patient (eg, review of tests)   - obtaining and/or reviewing separately obtained history  - performing a medically appropriate examination and/or evaluation   - counseling and educating the patient/family/caregiver    - ordering medications, tests, or procedures   - referring and communicating with other health care professionals (when not separately reported)   - documenting clinical information in the electronic or other health record   - independently interpreting results (not separately reported) and communicating results to the patient/family/caregiver   - care coordination (not separately reported)

## 2025-06-11 NOTE — PROGRESS NOTE ADULT - REASON FOR ADMISSION
GI bleed; Syncope

## 2025-06-11 NOTE — DISCHARGE NOTE NURSING/CASE MANAGEMENT/SOCIAL WORK - PATIENT PORTAL LINK FT
You can access the FollowMyHealth Patient Portal offered by [facility] by registering at the following website: [email or web address]. By joining [facility]’s FollowMyHealth portal, you will also be able to view your health information using other applications (apps) compatible with our system.

## 2025-06-11 NOTE — PROGRESS NOTE ADULT - SUBJECTIVE AND OBJECTIVE BOX
[facility] Division of Hospital Medicine  Jonn Burt MD  Pager [phone]      Patient is a 84y old  Female who presents with a chief complaint of GI bleed; Syncope (07 Jun 2025 13:21)      SUBJECTIVE / OVERNIGHT EVENTS:    no acute event o/n     ADDITIONAL REVIEW OF SYSTEMS:    RESPIRATORY: No cough, wheezing, chills or hemoptysis; No shortness of breath  CARDIOVASCULAR: No chest pain, palpitations, dizziness, or leg swelling  GASTROINTESTINAL: No abdominal or epigastric pain. No nausea, vomiting, or hematemesis; No diarrhea or constipation. No melena or hematochezia.      MEDICATIONS  (STANDING):  atorvastatin 40 milliGRAM(s) Oral at bedtime  enoxaparin Injectable 40 milliGRAM(s) SubCutaneous every 24 hours  lacosamide 100 milliGRAM(s) Oral two times a day  polyethylene glycol 3350 17 Gram(s) Oral two times a day    MEDICATIONS  (PRN):  senna 2 Tablet(s) Oral at bedtime PRN Constipation      CAPILLARY BLOOD GLUCOSE        I&O's Summary      PHYSICAL EXAM:  Vital Signs Last 24 Hrs  T(C): 36.9 (08 Jun 2025 09:00), Max: 37.2 (07 Jun 2025 17:00)  T(F): 98.5 (08 Jun 2025 09:00), Max: 99 (07 Jun 2025 21:10)  HR: 98 (08 Jun 2025 09:00) (91 - 100)  BP: 155/85 (08 Jun 2025 09:00) (123/63 - 155/85)  BP(mean): --  RR: 18 (08 Jun 2025 09:00) (18 - 18)  SpO2: 98% (08 Jun 2025 09:00) (96% - 100%)    Parameters below as of 08 Jun 2025 09:00  Patient On (Oxygen Delivery Method): room air        CONSTITUTIONAL: awake, NAD,  EYES: PERRLA; conjunctiva and sclera clear  ENMT: Moist oral mucosa, no pharyngeal injection or exudates;   NECK: Supple, no palpable masses;  RESPIRATORY: Normal respiratory effort; lungs are clear to auscultation bilaterally  CARDIOVASCULAR: Regular rate and rhythm, normal S1 and S2, no murmur/rub/gallop; No lower extremity edema; Peripheral pulses are 2+ bilaterally  ABDOMEN: Nontender to palpation, normoactive bowel sounds, no rebound/guarding;   MUSCLOSKELETAL:   no clubbing or cyanosis of digits; no joint swelling or tenderness to palpation  PSYCH: A+O 2  NEUROLOGY: CN 2-12 are intact and symmetric; non verbal. moves all exts   SKIN: No rashes;     LABS:                        11.8   5.96  )-----------( 155      ( 08 Jun 2025 05:39 )             37.3     06-08    143  |  108[H]  |  20  ----------------------------<  113[H]  3.6   |  23  |  1.09    Ca    9.6      08 Jun 2025 05:39  Phos  2.6     06-08  Mg     2.20     06-08      PT/INR - ( 07 Jun 2025 05:00 )   PT: 13.3 sec;   INR: 1.15 ratio         PTT - ( 07 Jun 2025 05:00 )  PTT:29.6 sec      Urinalysis Basic - ( 08 Jun 2025 05:39 )    Color: x / Appearance: x / SG: x / pH: x  Gluc: 113 mg/dL / Ketone: x  / Bili: x / Urobili: x   Blood: x / Protein: x / Nitrite: x   Leuk Esterase: x / RBC: x / WBC x   Sq Epi: x / Non Sq Epi: x / Bacteria: x          RADIOLOGY & ADDITIONAL TESTS:  Results Reviewed:   Imaging Personally Reviewed:  Electrocardiogram Personally Reviewed:    COORDINATION OF CARE:  Care Discussed with Consultants/Other Providers [Y/N]:  Prior or Outpatient Records Reviewed [Y/N]:  
[facility] Division of Hospital Medicine  Andrew Lowe DO  Available via MS Teams    SUBJECTIVE / OVERNIGHT EVENTS:  Attempted to communicate with patient in [place]err[place]ian Creole, with  Sami #[id number]  Nonverbal at baseline, nods head in agreement     ADDITIONAL REVIEW OF SYSTEMS:    MEDICATIONS  (STANDING):  atorvastatin 40 milliGRAM(s) Oral at bedtime  enoxaparin Injectable 40 milliGRAM(s) SubCutaneous every 24 hours  lacosamide 100 milliGRAM(s) Oral two times a day  metoprolol tartrate 25 milliGRAM(s) Oral two times a day  polyethylene glycol 3350 17 Gram(s) Oral two times a day  potassium chloride   Powder 40 milliEquivalent(s) Oral every 6 hours  potassium phosphate / sodium phosphate Tablet (K-PHOS No. 2) 1 Tablet(s) Oral three times a day    MEDICATIONS  (PRN):  melatonin 3 milliGRAM(s) Oral at bedtime PRN Insomnia  senna 2 Tablet(s) Oral at bedtime PRN Constipation      I&O's Summary      PHYSICAL EXAM:  Vital Signs Last 24 Hrs  T(C): 37.1 (09 Jun 2025 04:56), Max: 37.1 (09 Jun 2025 04:56)  T(F): 98.7 (09 Jun 2025 04:56), Max: 98.7 (09 Jun 2025 04:56)  HR: 84 (09 Jun 2025 04:56) (84 - 94)  BP: 124/76 (09 Jun 2025 04:56) (124/76 - 153/95)  BP(mean): --  RR: 20 (09 Jun 2025 04:56) (16 - 20)  SpO2: 100% (09 Jun 2025 04:56) (99% - 100%)    Parameters below as of 09 Jun 2025 04:56  Patient On (Oxygen Delivery Method): room air      CONSTITUTIONAL: NAD  EYES:  non-icteric  NECK: Supple  RESP: No respiratory distress, no use of accessory muscles  CV: RRR  GI: Soft, NT, ND  MSK: No gross deformity   NEURO: Follows commands, moves extremities, nonverbal     LABS:                        12.3   5.75  )-----------( 168      ( 09 Jun 2025 05:44 )             39.0     06-09    142  |  107  |  12  ----------------------------<  95  3.1[L]   |  24  |  0.88    Ca    9.4      09 Jun 2025 05:44  Phos  2.4     06-09  Mg     2.00     06-09            Urinalysis Basic - ( 09 Jun 2025 05:44 )    Color: x / Appearance: x / SG: x / pH: x  Gluc: 95 mg/dL / Ketone: x  / Bili: x / Urobili: x   Blood: x / Protein: x / Nitrite: x   Leuk Esterase: x / RBC: x / WBC x   Sq Epi: x / Non Sq Epi: x / Bacteria: x            RADIOLOGY & ADDITIONAL TESTS:  New Imaging Personally Reviewed Today: yes  New Electrocardiogram Personally Reviewed Today: yes  Other Results Reviewed Today: yes  Prior or Outpatient Records Reviewed Today with Summary: yes    COORDINATION OF CARE:  Consultant Communication and Details of Discussion (where applicable):    
Neurology Progress Note    SUBJECTIVE/OBJECTIVE/INTERVAL EVENTS: Patient seen and examined at bedside w/ neuro attending and team.   Attempts to remove IV intermittently but otherwise appears stable and well, some left sided gaze preference and right sided weakness.      MEDICATIONS  (STANDING):  atorvastatin 40 milliGRAM(s) Oral at bedtime  enoxaparin Injectable 40 milliGRAM(s) SubCutaneous every 24 hours  lacosamide 100 milliGRAM(s) Oral two times a day  metoprolol tartrate 25 milliGRAM(s) Oral two times a day  polyethylene glycol 3350 17 Gram(s) Oral two times a day  potassium chloride   Powder 40 milliEquivalent(s) Oral every 6 hours  potassium phosphate / sodium phosphate Tablet (K-PHOS No. 2) 1 Tablet(s) Oral three times a day    MEDICATIONS  (PRN):  melatonin 3 milliGRAM(s) Oral at bedtime PRN Insomnia  senna 2 Tablet(s) Oral at bedtime PRN Constipation      VITALS & EXAMINATION:  Vital Signs Last 24 Hrs  T(C): 37.1 (09 Jun 2025 04:56), Max: 37.1 (09 Jun 2025 04:56)  T(F): 98.7 (09 Jun 2025 04:56), Max: 98.7 (09 Jun 2025 04:56)  HR: 84 (09 Jun 2025 04:56) (84 - 94)  BP: 124/76 (09 Jun 2025 04:56) (124/76 - 153/95)  BP(mean): --  RR: 20 (09 Jun 2025 04:56) (16 - 20)  SpO2: 100% (09 Jun 2025 04:56) (99% - 100%)    Parameters below as of 09 Jun 2025 04:56  Patient On (Oxygen Delivery Method): room air        General:  MS: Opens eyes, smiles attends to examiner. Not following any commands.    Language: No meaningful verbal output.    CNs: Right pupil reactive, left pupil minimal reactivity. Appears to BTT b/l. Gaze midline, but seems to have left gaze preference cross midline with oculocephalics  No obvious facial asymmetry at rest.    Motor: Normal muscle bulk. No noticeable tremor. B/L UE move spontaneously - antigravity - pulls at blanket with both hands.   Coordination:  Unable to assess.   Gait: Unable to assess.      LABORATORY:  CBC                       12.3   5.75  )-----------( 168      ( 09 Jun 2025 05:44 )             39.0     Chem 06-09    142  |  107  |  12  ----------------------------<  95  3.1[L]   |  24  |  0.88    Ca    9.4      09 Jun 2025 05:44  Phos  2.4     06-09  Mg     2.00     06-09      LFTs   Coagulopathy   Lipid Panel 06-07 Chol 111 LDL -- HDL 43[L] Trig 72  A1c   Cardiac enzymes     U/A Urinalysis Basic - ( 09 Jun 2025 05:44 )    Color: x / Appearance: x / SG: x / pH: x  Gluc: 95 mg/dL / Ketone: x  / Bili: x / Urobili: x   Blood: x / Protein: x / Nitrite: x   Leuk Esterase: x / RBC: x / WBC x   Sq Epi: x / Non Sq Epi: x / Bacteria: x      CSF  Immunological  Other    STUDIES & IMAGING: (EEG, CT, MR, U/S, TTE/HARITHA):  CBC                       12.4   8.60  )-----------( 153      ( 06 Jun 2025 05:38 )             38.2     Chem 06-06    143  |  107  |  13  ----------------------------<  97  3.3[L]   |  26  |  0.98    Ca    9.5      06 Jun 2025 05:38    TPro  7.2  /  Alb  3.5  /  TBili  0.4  /  DBili  x   /  AST  17  /  ALT  12  /  AlkPhos  120  06-04    LFTs LIVER FUNCTIONS - ( 04 Jun 2025 23:05 )  Alb: 3.5 g/dL / Pro: 7.2 g/dL / ALK PHOS: 120 U/L / ALT: 12 U/L / AST: 17 U/L / GGT: x           Coagulopathy PT/INR - ( 05 Jun 2025 21:07 )   PT: 12.8 sec;   INR: 1.10 ratio         PTT - ( 05 Jun 2025 21:07 )  PTT:34.5 sec  Lipid Panel   A1c   Cardiac enzymes     U/A Urinalysis Basic - ( 06 Jun 2025 05:38 )    Color: x / Appearance: x / SG: x / pH: x  Gluc: 97 mg/dL / Ketone: x  / Bili: x / Urobili: x   Blood: x / Protein: x / Nitrite: x   Leuk Esterase: x / RBC: x / WBC x   Sq Epi: x / Non Sq Epi: x / Bacteria: x        Radiology (XR, CT, MR, U/S, TTE/HARITHA):    CTH (6/5/25 @ 1048):  Acute parenchymal hemorrhage is identified.    CTH (6/5/25 @ 1530): No change since 10:47 AM. Left superior cerebellar parenchymal hemorrhage with mild vasogenic edema. Lucency right cerebellum suspicious for recent infarct. Old left frontal and right parietal cortical infarcts. Left M1/M2 stent.    CTH/CTA (6/5/25 @ 2320): Stable parenchymal hemorrhage. Area of low-attenuation on the right cerebellar region which could be compatible with an acute infarct. Old infarcts again seen and unchanged CTA of the neck demonstrates no significant stenosis. CTA of the [place] demonstrates evidence of a left-sided stent with short segment of decreased flow related signal seen involving the region of the stent.    TTE (6/5/25):    1. Left ventricular cavity is small. Left ventricular wall thickness is normal. Left ventricular systolic function is normal with an ejection fraction visually estimated at 60 to 65 %. There are no regional wall motion abnormalities seen.   2. Normal right ventricular cavity size and probably normal right ventricular systolic function.   3. Structurally normal mitral valve with normal leaflet excursion. There is calcification of the mitral valve annulus. There is trace mitral regurgitation.   4. The left atrium is moderately dilated with an indexed volume of 42.80 ml/m².  
Patient is seen and examined. Patient is aphasic. No acute distress noted  Telemerty shows atrial fibrillation with VR 90s-120s    PAST MEDICAL & SURGICAL HISTORY:  Afib    H/O: CVA (cerebrovascular accident)    No significant past surgical history        MEDICATIONS  (STANDING):  atorvastatin 40 milliGRAM(s) Oral at bedtime  enoxaparin Injectable 40 milliGRAM(s) SubCutaneous every 24 hours  lacosamide 100 milliGRAM(s) Oral two times a day  metoprolol tartrate 25 milliGRAM(s) Oral two times a day  polyethylene glycol 3350 17 Gram(s) Oral two times a day    MEDICATIONS  (PRN):  haloperidol    Injectable 2 milliGRAM(s) IV Push once PRN agitation prior to MRI  melatonin 3 milliGRAM(s) Oral at bedtime PRN Insomnia  senna 2 Tablet(s) Oral at bedtime PRN Constipation      Vital Signs Last 24 Hrs  T(C): 36.4 (10 Ramos 2025 08:05), Max: 37.1 (10 Ramos 2025 05:12)  T(F): 97.6 (10 Ramos 2025 08:05), Max: 98.7 (10 Ramos 2025 05:12)  HR: 95 (10 Ramos 2025 08:05) (80 - 97)  BP: 156/71 (10 Ramos 2025 08:05) (125/73 - 156/71)  BP(mean): --  RR: 18 (10 Ramos 2025 08:05) (17 - 19)  SpO2: 99% (10 Ramos 2025 08:05) (95% - 100%)    Parameters below as of 10 Ramos 2025 08:05  Patient On (Oxygen Delivery Method): room air    LABS:                        12.4   6.13  )-----------( 162      ( 10 Ramos 2025 00:58 )             37.9     06-10    142  |  108[H]  |  12  ----------------------------<  100[H]  4.2   |  22  |  0.81    Ca    9.7      10 Ramos 2025 00:58  Phos  2.5     06-10  Mg     2.00     06-10            Urinalysis Basic - ( 10 Ramos 2025 00:58 )    Color: x / Appearance: x / SG: x / pH: x  Gluc: 100 mg/dL / Ketone: x  / Bili: x / Urobili: x   Blood: x / Protein: x / Nitrite: x   Leuk Esterase: x / RBC: x / WBC x   Sq Epi: x / Non Sq Epi: x / Bacteria: x      I&O's Summary    09 Jun 2025 07:01  -  10 Ramos 2025 07:00  --------------------------------------------------------  IN: 720 mL / OUT: 0 mL / NET: 720 mL    PHYSICAL EXAM:    GENERAL: In no apparent distress, well nourished, and hydrated.  HEART: Irregular rate and rhythm; No murmurs, rubs, or gallops.  PULMONARY: Clear to auscultation. No rales, wheezing, or rhonchi bilaterally.  ABDOMEN: Soft, Nontender, Nondistended; Bowel sounds present  EXTREMITIES:  2+ Peripheral Pulses, No clubbing, cyanosis, or edema          
[facility] Division of Hospital Medicine  Andrew Lowe DO  Available via MS Teams    SUBJECTIVE / OVERNIGHT EVENTS:  Resting in bed  Nonverbal at baseline, but no acute distress     ADDITIONAL REVIEW OF SYSTEMS:    MEDICATIONS  (STANDING):  atorvastatin 40 milliGRAM(s) Oral at bedtime  enoxaparin Injectable 40 milliGRAM(s) SubCutaneous every 24 hours  lacosamide 100 milliGRAM(s) Oral two times a day  metoprolol tartrate 25 milliGRAM(s) Oral two times a day  polyethylene glycol 3350 17 Gram(s) Oral two times a day    MEDICATIONS  (PRN):  haloperidol    Injectable 2 milliGRAM(s) IV Push once PRN agitation prior to MRI  melatonin 3 milliGRAM(s) Oral at bedtime PRN Insomnia  senna 2 Tablet(s) Oral at bedtime PRN Constipation      I&O's Summary    09 Jun 2025 07:01  -  10 Ramos 2025 07:00  --------------------------------------------------------  IN: 720 mL / OUT: 0 mL / NET: 720 mL        PHYSICAL EXAM:  Vital Signs Last 24 Hrs  T(C): 36.6 (10 Ramos 2025 12:00), Max: 37.1 (10 Ramos 2025 05:12)  T(F): 97.8 (10 Ramos 2025 12:00), Max: 98.7 (10 Ramos 2025 05:12)  HR: 83 (10 Ramos 2025 12:00) (83 - 97)  BP: 149/88 (10 Ramos 2025 12:00) (125/73 - 156/71)  BP(mean): --  RR: 18 (10 Ramos 2025 12:00) (17 - 19)  SpO2: 99% (10 Ramos 2025 12:00) (95% - 100%)    Parameters below as of 10 Ramos 2025 12:00  Patient On (Oxygen Delivery Method): room air      CONSTITUTIONAL: NAD  EYES:  non-icteric  NECK: Supple  RESP: No respiratory distress  CV: RRR  GI: Soft, ND  MSK: No gross deformity   NEURO: nonverbal     LABS:                        12.4   6.13  )-----------( 162      ( 10 Ramos 2025 00:58 )             37.9     06-10    142  |  108[H]  |  12  ----------------------------<  100[H]  4.2   |  22  |  0.81    Ca    9.7      10 Ramos 2025 00:58  Phos  2.5     06-10  Mg     2.00     06-10            Urinalysis Basic - ( 10 Ramos 2025 00:58 )    Color: x / Appearance: x / SG: x / pH: x  Gluc: 100 mg/dL / Ketone: x  / Bili: x / Urobili: x   Blood: x / Protein: x / Nitrite: x   Leuk Esterase: x / RBC: x / WBC x   Sq Epi: x / Non Sq Epi: x / Bacteria: x            RADIOLOGY & ADDITIONAL TESTS:  New Imaging Personally Reviewed Today: yes  New Electrocardiogram Personally Reviewed Today: yes  Other Results Reviewed Today: yes  Prior or Outpatient Records Reviewed Today with Summary: yes    COORDINATION OF CARE:  Consultant Communication and Details of Discussion (where applicable):    
[facility] Division of [facility]  Andrew Lowe DO  Available via MS Teams    SUBJECTIVE / OVERNIGHT EVENTS:  Resting in bed  Nonverbal at baseline, but no acute distress     ADDITIONAL REVIEW OF SYSTEMS:    MEDICATIONS  (STANDING):  aspirin  chewable 81 milliGRAM(s) Oral daily  atorvastatin 40 milliGRAM(s) Oral at bedtime  chlorhexidine 2% Cloths 1 Application(s) Topical daily  enoxaparin Injectable 40 milliGRAM(s) SubCutaneous every 24 hours  lacosamide 100 milliGRAM(s) Oral two times a day  metoprolol tartrate 25 milliGRAM(s) Oral two times a day  polyethylene glycol 3350 17 Gram(s) Oral two times a day    MEDICATIONS  (PRN):  haloperidol    Injectable 2 milliGRAM(s) IV Push once PRN agitation prior to MRI  melatonin 3 milliGRAM(s) Oral at bedtime PRN Insomnia  senna 2 Tablet(s) Oral at bedtime PRN Constipation      I&O's Summary      PHYSICAL EXAM:  Vital Signs Last 24 Hrs  T(C): 36.8 (11 Jun 2025 12:04), Max: 37.2 (11 Jun 2025 08:04)  T(F): 98.2 (11 Jun 2025 12:04), Max: 98.9 (11 Jun 2025 08:04)  HR: 67 (11 Jun 2025 12:04) (67 - 93)  BP: 120/90 (11 Jun 2025 12:04) (120/90 - 158/80)  BP(mean): --  RR: 18 (11 Jun 2025 12:04) (18 - 18)  SpO2: 99% (11 Jun 2025 12:04) (96% - 100%)    Parameters below as of 11 Jun 2025 12:04  Patient On (Oxygen Delivery Method): room air      CONSTITUTIONAL: NAD  EYES:  non-icteric  NECK: Supple  RESP: No respiratory distress  CV: RRR  GI: Soft, ND  MSK: No gross deformity   NEURO: nonverbal     LABS:                        12.6   5.12  )-----------( 182      ( 11 Jun 2025 07:00 )             38.9     06-11    142  |  108[H]  |  18  ----------------------------<  95  4.1   |  23  |  1.09    Ca    9.7      11 Jun 2025 07:00  Phos  3.8     06-11  Mg     2.20     06-11            Urinalysis Basic - ( 11 Jun 2025 07:00 )    Color: x / Appearance: x / SG: x / pH: x  Gluc: 95 mg/dL / Ketone: x  / Bili: x / Urobili: x   Blood: x / Protein: x / Nitrite: x   Leuk Esterase: x / RBC: x / WBC x   Sq Epi: x / Non Sq Epi: x / Bacteria: x            RADIOLOGY & ADDITIONAL TESTS:  New Imaging Personally Reviewed Today: yes  New Electrocardiogram Personally Reviewed Today: yes  Other Results Reviewed Today: yes  Prior or Outpatient Records Reviewed Today with Summary: yes    COORDINATION OF CARE:  Consultant Communication and Details of Discussion (where applicable):    
PROGRESS NOTE:   Authored by Christel Hillman Ma, MD  Available on MS Teams; Pager [phone]    Patient is a 84y old  Female who presents with a chief complaint of GI bleed; Syncope (05 Jun 2025 14:26)      SUBJECTIVE / OVERNIGHT EVENTS: NAEON. Pt seen and examined with dtr at bedside this AM. Pt resting comfortably, no new or acute complaints per dtr, non-verbal (at baseline).    All other review of systems is negative unless indicated above.    MEDICATIONS  (STANDING):  atorvastatin 40 milliGRAM(s) Oral at bedtime  lacosamide 100 milliGRAM(s) Oral two times a day  polyethylene glycol 3350 17 Gram(s) Oral two times a day    MEDICATIONS  (PRN):  senna 2 Tablet(s) Oral at bedtime PRN Constipation      CAPILLARY BLOOD GLUCOSE        I&O's Summary      PHYSICAL EXAM:  Vital Signs Last 24 Hrs  T(C): 36.7 (06 Jun 2025 13:03), Max: 36.9 (05 Jun 2025 22:13)  T(F): 98 (06 Jun 2025 13:03), Max: 98.4 (05 Jun 2025 22:13)  HR: 94 (06 Jun 2025 13:03) (80 - 99)  BP: 132/74 (06 Jun 2025 13:03) (132/74 - 154/79)  BP(mean): --  RR: 18 (06 Jun 2025 13:03) (18 - 19)  SpO2: 100% (06 Jun 2025 13:03) (95% - 100%)    Parameters below as of 06 Jun 2025 13:03  Patient On (Oxygen Delivery Method): room air        GENERAL: No acute distress  HEAD:  Normocephalic  NECK: Supple  CHEST/LUNG: CTAB  HEART: Regular rate and rhythm  ABDOMEN: Soft, non-tender, non-distended  EXTREMITIES: No clubbing, cyanosis, or edema  NEUROLOGY: Resting comfortably, non-verbal at baseline  SKIN: No rashes or lesions to visible skin    LABS:                        12.4   8.60  )-----------( 153      ( 06 Jun 2025 05:38 )             38.2     06-06    143  |  107  |  13  ----------------------------<  97  3.3[L]   |  26  |  0.98    Ca    9.5      06 Jun 2025 05:38    TPro  7.2  /  Alb  3.5  /  TBili  0.4  /  DBili  x   /  AST  17  /  ALT  12  /  AlkPhos  120  06-04    PT/INR - ( 05 Jun 2025 21:07 )   PT: 12.8 sec;   INR: 1.10 ratio         PTT - ( 05 Jun 2025 21:07 )  PTT:34.5 sec      Urinalysis Basic - ( 06 Jun 2025 05:38 )    Color: x / Appearance: x / SG: x / pH: x  Gluc: 97 mg/dL / Ketone: x  / Bili: x / Urobili: x   Blood: x / Protein: x / Nitrite: x   Leuk Esterase: x / RBC: x / WBC x   Sq Epi: x / Non Sq Epi: x / Bacteria: x        Urinalysis with Rflx Culture (collected 05 Jun 2025 10:50)          RADIOLOGY & ADDITIONAL TESTS: Reviewed  
[facility] Division of Hospital Medicine  Jonn Burt MD  Pager [phone]      Patient is a 84y old  Female who presents with a chief complaint of GI bleed; Syncope (06 Jun 2025 21:02)      SUBJECTIVE / OVERNIGHT EVENTS:    no acute event o/n. per granddaughter at bedside pt's mental status is close to baseline. no new GIB     ADDITIONAL REVIEW OF SYSTEMS:    RESPIRATORY: No cough, wheezing, chills or hemoptysis; No shortness of breath  CARDIOVASCULAR: No chest pain, palpitations, dizziness, or leg swelling  GASTROINTESTINAL: No abdominal or epigastric pain. No nausea, vomiting, or hematemesis; No diarrhea or constipation. No melena or hematochezia.      MEDICATIONS  (STANDING):  atorvastatin 40 milliGRAM(s) Oral at bedtime  dextrose 5% + sodium chloride 0.45%. 1000 milliLiter(s) (75 mL/Hr) IV Continuous <Continuous>  enoxaparin Injectable 40 milliGRAM(s) SubCutaneous every 24 hours  lacosamide 100 milliGRAM(s) Oral two times a day  polyethylene glycol 3350 17 Gram(s) Oral two times a day    MEDICATIONS  (PRN):  senna 2 Tablet(s) Oral at bedtime PRN Constipation      CAPILLARY BLOOD GLUCOSE      POCT Blood Glucose.: 106 mg/dL (06 Jun 2025 21:16)    I&O's Summary      PHYSICAL EXAM:  Vital Signs Last 24 Hrs  T(C): 36.7 (07 Jun 2025 13:00), Max: 37.2 (07 Jun 2025 02:00)  T(F): 98 (07 Jun 2025 13:00), Max: 98.9 (07 Jun 2025 02:00)  HR: 91 (07 Jun 2025 13:00) (87 - 98)  BP: 124/72 (07 Jun 2025 13:00) (124/72 - 150/70)  BP(mean): --  RR: 18 (07 Jun 2025 13:00) (17 - 18)  SpO2: 96% (07 Jun 2025 13:00) (96% - 100%)    Parameters below as of 07 Jun 2025 13:00  Patient On (Oxygen Delivery Method): room air        CONSTITUTIONAL: awake, NAD,  EYES: PERRLA; conjunctiva and sclera clear  ENMT: Moist oral mucosa, no pharyngeal injection or exudates;   NECK: Supple, no palpable masses;  RESPIRATORY: Normal respiratory effort; lungs are clear to auscultation bilaterally  CARDIOVASCULAR: Regular rate and rhythm, normal S1 and S2, no murmur/rub/gallop; No lower extremity edema; Peripheral pulses are 2+ bilaterally  ABDOMEN: Nontender to palpation, normoactive bowel sounds, no rebound/guarding;   MUSCLOSKELETAL:   no clubbing or cyanosis of digits; no joint swelling or tenderness to palpation  PSYCH: A+O 1  NEUROLOGY: CN 2-12 are intact and symmetric; no verbal. moves all exts   SKIN: No rashes;     LABS:                        12.1   8.08  )-----------( 153      ( 07 Jun 2025 05:00 )             37.2     06-07    146[H]  |  109[H]  |  16  ----------------------------<  82  3.5   |  23  |  1.02    Ca    9.7      07 Jun 2025 05:00  Phos  2.6     06-07  Mg     2.30     06-07      PT/INR - ( 07 Jun 2025 05:00 )   PT: 13.3 sec;   INR: 1.15 ratio         PTT - ( 07 Jun 2025 05:00 )  PTT:29.6 sec      Urinalysis Basic - ( 07 Jun 2025 05:00 )    Color: x / Appearance: x / SG: x / pH: x  Gluc: 82 mg/dL / Ketone: x  / Bili: x / Urobili: x   Blood: x / Protein: x / Nitrite: x   Leuk Esterase: x / RBC: x / WBC x   Sq Epi: x / Non Sq Epi: x / Bacteria: x        Urinalysis with Rflx Culture (collected 05 Jun 2025 10:50)        RADIOLOGY & ADDITIONAL TESTS:  Results Reviewed:   Imaging Personally Reviewed:  Electrocardiogram Personally Reviewed:    COORDINATION OF CARE:  Care Discussed with Consultants/Other Providers [Y/N]:  Prior or Outpatient Records Reviewed [Y/N]:

## 2025-06-11 NOTE — PROVIDER CONTACT NOTE (OTHER) - BACKGROUND
84F PMH HTN, HLD, CVA (non-verbal at baseline), Afib, and seizure p/w syncope. Found to have Acute Lt cerebellar parenchymal hemorrhage, Acute Rt Cerebellar CVA on CT awaiting MRi
Patient admitted for GI bleed, new CVA

## 2025-06-11 NOTE — PROVIDER CONTACT NOTE (OTHER) - ASSESSMENT
pt vss and at baseline mental status. no other s/s of bleeding
patient in no cardiac or respiratory distress

## 2025-06-12 RX ORDER — ENALAPRIL MALEATE 20 MG
1 TABLET ORAL
Refills: 0 | DISCHARGE

## 2025-06-12 RX ORDER — APIXABAN 2.5 MG/1
1 TABLET, FILM COATED ORAL
Refills: 0 | DISCHARGE

## 2025-06-12 RX ORDER — LACOSAMIDE 150 MG/1
1 TABLET, FILM COATED ORAL
Refills: 0 | DISCHARGE

## 2025-06-12 RX ORDER — METOPROLOL SUCCINATE 50 MG/1
1 TABLET, EXTENDED RELEASE ORAL
Refills: 0 | DISCHARGE

## 2025-06-12 RX ORDER — ATORVASTATIN CALCIUM 80 MG/1
1 TABLET, FILM COATED ORAL
Refills: 0 | DISCHARGE

## 2025-06-12 RX ORDER — HYDROCHLOROTHIAZIDE 50 MG/1
1 TABLET ORAL
Refills: 0 | DISCHARGE

## 2025-06-25 PROBLEM — I48.91 UNSPECIFIED ATRIAL FIBRILLATION: Chronic | Status: ACTIVE | Noted: 2025-01-01

## 2025-06-25 PROBLEM — Z86.73 PERSONAL HISTORY OF TRANSIENT ISCHEMIC ATTACK (TIA), AND CEREBRAL INFARCTION WITHOUT RESIDUAL DEFICITS: Chronic | Status: ACTIVE | Noted: 2025-06-04

## 2025-07-03 NOTE — ED ADULT TRIAGE NOTE - CHIEF COMPLAINT QUOTE
Visiting Nurse said she heard lung sounds, O2 81 % on Room air placed On Nasal cannula H/O CVA confused Family is holding a DNR

## 2025-07-03 NOTE — ED PROVIDER NOTE - PHYSICAL EXAMINATION
On Physical Exam:  General: well appearing, in NAD, speaking clearly in full sentences and without difficulty; cooperative with exam  HEENT: anicteric sclera, airway patent  Neck: no JVD  Cardiac: normal s1, s2; RRR; no MGR  Lungs: slight rales/wheezing in LLL/LML  Abdomen: soft nontender/nondistended  Skin: intact, no rash  Extremities: no peripheral edema, no gross deformities

## 2025-07-03 NOTE — ED PROVIDER NOTE - CLINICAL SUMMARY MEDICAL DECISION MAKING FREE TEXT BOX
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  Monitor oxygen requirement; family desiring dishcarge but do not have oxygen at home, if continuing supplemental o2 requirement may need admission.

## 2025-07-03 NOTE — ED PROVIDER NOTE - NSSERVICENOTAVAIL_ED_A_ED
Other: Not appropriate to assess secondary to decreased sitting balance. To be assessed at later date/time if and when safe and appropriate.

## 2025-07-03 NOTE — ED PROVIDER NOTE - PROGRESS NOTE DETAILS
Patient found to have extensive pulmonary embolism on imaging, with right heart strain due to findings PERT team consulted via transfer center, Tier 1 transfer requested, discussed case with Dr. Benito.  Dr. Benito changed transfer to a tier 2 and accepted transfer. Patient found to have extensive pulmonary embolism on imaging, with right heart strain due to findings PERT team consulted via transfer center, Tier 1 transfer requested, discussed case with Dr. Benito.  Dr. Benito changed transfer to a tier 2 and accepted transfer. Recommended starting heparin for coverage.

## 2025-07-03 NOTE — ED ADULT NURSE NOTE - OBJECTIVE STATEMENT
pt aaox0. daughter bedside. pt non verbal. family reports decreased eating and drinking. pt moves extremities with weakness. intermittent tachypnea with use of accessory muscles. maintains sats in ed. nc 2L

## 2025-07-03 NOTE — ED PROVIDER NOTE - OBJECTIVE STATEMENT
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  DNR/DNI (has molst completed; ok with trial of antibiotics/medication; not completed part of forms regarding PEG/etc).

## 2025-07-06 PROCEDURE — 94660 CPAP INITIATION&MGMT: CPT

## 2025-07-06 PROCEDURE — 84295 ASSAY OF SERUM SODIUM: CPT

## 2025-07-06 PROCEDURE — 82330 ASSAY OF CALCIUM: CPT

## 2025-07-06 PROCEDURE — 85730 THROMBOPLASTIN TIME PARTIAL: CPT

## 2025-07-06 PROCEDURE — 93325 DOPPLER ECHO COLOR FLOW MAPG: CPT

## 2025-07-06 PROCEDURE — 96374 THER/PROPH/DIAG INJ IV PUSH: CPT

## 2025-07-06 PROCEDURE — 82803 BLOOD GASES ANY COMBINATION: CPT

## 2025-07-06 PROCEDURE — 70450 CT HEAD/BRAIN W/O DYE: CPT

## 2025-07-06 PROCEDURE — 93308 TTE F-UP OR LMTD: CPT

## 2025-07-06 PROCEDURE — 83605 ASSAY OF LACTIC ACID: CPT

## 2025-07-06 PROCEDURE — 85014 HEMATOCRIT: CPT

## 2025-07-06 PROCEDURE — 85018 HEMOGLOBIN: CPT

## 2025-07-06 PROCEDURE — 82435 ASSAY OF BLOOD CHLORIDE: CPT

## 2025-07-06 PROCEDURE — 84132 ASSAY OF SERUM POTASSIUM: CPT

## 2025-07-06 PROCEDURE — 85610 PROTHROMBIN TIME: CPT

## 2025-07-06 PROCEDURE — 93321 DOPPLER ECHO F-UP/LMTD STD: CPT

## 2025-07-06 PROCEDURE — C9254: CPT

## 2025-07-06 PROCEDURE — 99291 CRITICAL CARE FIRST HOUR: CPT | Mod: 25

## 2025-07-06 PROCEDURE — 82947 ASSAY GLUCOSE BLOOD QUANT: CPT
